# Patient Record
Sex: FEMALE | Race: BLACK OR AFRICAN AMERICAN | NOT HISPANIC OR LATINO | URBAN - METROPOLITAN AREA
[De-identification: names, ages, dates, MRNs, and addresses within clinical notes are randomized per-mention and may not be internally consistent; named-entity substitution may affect disease eponyms.]

---

## 2017-03-30 ENCOUNTER — EMERGENCY (EMERGENCY)
Facility: HOSPITAL | Age: 42
LOS: 1 days | Discharge: HOME | End: 2017-03-30
Attending: EMERGENCY MEDICINE | Admitting: EMERGENCY MEDICINE
Payer: MEDICAID

## 2017-03-30 VITALS
OXYGEN SATURATION: 100 % | HEART RATE: 93 BPM | TEMPERATURE: 98 F | SYSTOLIC BLOOD PRESSURE: 135 MMHG | RESPIRATION RATE: 18 BRPM | DIASTOLIC BLOOD PRESSURE: 89 MMHG

## 2017-03-30 VITALS
DIASTOLIC BLOOD PRESSURE: 90 MMHG | RESPIRATION RATE: 18 BRPM | HEART RATE: 85 BPM | OXYGEN SATURATION: 100 % | SYSTOLIC BLOOD PRESSURE: 146 MMHG

## 2017-03-30 LAB
ALBUMIN SERPL ELPH-MCNC: 4.7 G/DL — SIGNIFICANT CHANGE UP (ref 3.3–5)
ALP SERPL-CCNC: 49 U/L — SIGNIFICANT CHANGE UP (ref 40–120)
ALT FLD-CCNC: 12 U/L — SIGNIFICANT CHANGE UP (ref 4–33)
APPEARANCE UR: CLEAR — SIGNIFICANT CHANGE UP
AST SERPL-CCNC: 18 U/L — SIGNIFICANT CHANGE UP (ref 4–32)
BACTERIA # UR AUTO: SIGNIFICANT CHANGE UP
BASE EXCESS BLDV CALC-SCNC: 3.9 MMOL/L — SIGNIFICANT CHANGE UP
BASOPHILS # BLD AUTO: 0.01 K/UL — SIGNIFICANT CHANGE UP (ref 0–0.2)
BASOPHILS NFR BLD AUTO: 0.1 % — SIGNIFICANT CHANGE UP (ref 0–2)
BILIRUB SERPL-MCNC: 0.3 MG/DL — SIGNIFICANT CHANGE UP (ref 0.2–1.2)
BILIRUB UR-MCNC: NEGATIVE — SIGNIFICANT CHANGE UP
BLOOD GAS VENOUS - CREATININE: 0.63 MG/DL — SIGNIFICANT CHANGE UP (ref 0.5–1.3)
BLOOD UR QL VISUAL: HIGH
BUN SERPL-MCNC: 8 MG/DL — SIGNIFICANT CHANGE UP (ref 7–23)
CALCIUM SERPL-MCNC: 9.5 MG/DL — SIGNIFICANT CHANGE UP (ref 8.4–10.5)
CHLORIDE BLDV-SCNC: 99 MMOL/L — SIGNIFICANT CHANGE UP (ref 96–108)
CHLORIDE SERPL-SCNC: 95 MMOL/L — LOW (ref 98–107)
CO2 SERPL-SCNC: 25 MMOL/L — SIGNIFICANT CHANGE UP (ref 22–31)
COLOR SPEC: SIGNIFICANT CHANGE UP
CREAT SERPL-MCNC: 0.68 MG/DL — SIGNIFICANT CHANGE UP (ref 0.5–1.3)
EOSINOPHIL # BLD AUTO: 0 K/UL — SIGNIFICANT CHANGE UP (ref 0–0.5)
EOSINOPHIL NFR BLD AUTO: 0 % — SIGNIFICANT CHANGE UP (ref 0–6)
GAS PNL BLDV: 136 MMOL/L — SIGNIFICANT CHANGE UP (ref 136–146)
GLUCOSE BLDV-MCNC: 113 — HIGH (ref 70–99)
GLUCOSE SERPL-MCNC: 112 MG/DL — HIGH (ref 70–99)
GLUCOSE UR-MCNC: NEGATIVE — SIGNIFICANT CHANGE UP
HCO3 BLDV-SCNC: 26 MMOL/L — SIGNIFICANT CHANGE UP (ref 20–27)
HCT VFR BLD CALC: 41.6 % — SIGNIFICANT CHANGE UP (ref 34.5–45)
HCT VFR BLDV CALC: 43.5 % — SIGNIFICANT CHANGE UP (ref 34.5–45)
HGB BLD-MCNC: 13.8 G/DL — SIGNIFICANT CHANGE UP (ref 11.5–15.5)
HGB BLDV-MCNC: 14.2 G/DL — SIGNIFICANT CHANGE UP (ref 11.5–15.5)
IMM GRANULOCYTES NFR BLD AUTO: 0.4 % — SIGNIFICANT CHANGE UP (ref 0–1.5)
KETONES UR-MCNC: SIGNIFICANT CHANGE UP
LACTATE BLDV-MCNC: 1.6 MMOL/L — SIGNIFICANT CHANGE UP (ref 0.5–2)
LEUKOCYTE ESTERASE UR-ACNC: NEGATIVE — SIGNIFICANT CHANGE UP
LYMPHOCYTES # BLD AUTO: 0.99 K/UL — LOW (ref 1–3.3)
LYMPHOCYTES # BLD AUTO: 6.6 % — LOW (ref 13–44)
MCHC RBC-ENTMCNC: 31 PG — SIGNIFICANT CHANGE UP (ref 27–34)
MCHC RBC-ENTMCNC: 33.2 % — SIGNIFICANT CHANGE UP (ref 32–36)
MCV RBC AUTO: 93.5 FL — SIGNIFICANT CHANGE UP (ref 80–100)
MONOCYTES # BLD AUTO: 0.83 K/UL — SIGNIFICANT CHANGE UP (ref 0–0.9)
MONOCYTES NFR BLD AUTO: 5.5 % — SIGNIFICANT CHANGE UP (ref 2–14)
MUCOUS THREADS # UR AUTO: SIGNIFICANT CHANGE UP
NEUTROPHILS # BLD AUTO: 13.16 K/UL — HIGH (ref 1.8–7.4)
NEUTROPHILS NFR BLD AUTO: 87.4 % — HIGH (ref 43–77)
NITRITE UR-MCNC: NEGATIVE — SIGNIFICANT CHANGE UP
PCO2 BLDV: 47 MMHG — SIGNIFICANT CHANGE UP (ref 41–51)
PH BLDV: 7.4 PH — SIGNIFICANT CHANGE UP (ref 7.32–7.43)
PH UR: 6.5 — SIGNIFICANT CHANGE UP (ref 4.6–8)
PLATELET # BLD AUTO: 388 K/UL — SIGNIFICANT CHANGE UP (ref 150–400)
PMV BLD: 10.6 FL — SIGNIFICANT CHANGE UP (ref 7–13)
PO2 BLDV: < 24 MMHG — LOW (ref 35–40)
POTASSIUM BLDV-SCNC: 3.1 MMOL/L — LOW (ref 3.4–4.5)
POTASSIUM SERPL-MCNC: 3.2 MMOL/L — LOW (ref 3.5–5.3)
POTASSIUM SERPL-SCNC: 3.2 MMOL/L — LOW (ref 3.5–5.3)
PROT SERPL-MCNC: 8.5 G/DL — HIGH (ref 6–8.3)
PROT UR-MCNC: 10 — SIGNIFICANT CHANGE UP
RBC # BLD: 4.45 M/UL — SIGNIFICANT CHANGE UP (ref 3.8–5.2)
RBC # FLD: 14.7 % — HIGH (ref 10.3–14.5)
RBC CASTS # UR COMP ASSIST: HIGH (ref 0–?)
SAO2 % BLDV: 36.5 % — LOW (ref 60–85)
SODIUM SERPL-SCNC: 138 MMOL/L — SIGNIFICANT CHANGE UP (ref 135–145)
SP GR SPEC: 1.01 — SIGNIFICANT CHANGE UP (ref 1–1.03)
SQUAMOUS # UR AUTO: SIGNIFICANT CHANGE UP
UROBILINOGEN FLD QL: NORMAL E.U. — SIGNIFICANT CHANGE UP (ref 0.1–0.2)
WBC # BLD: 15.05 K/UL — HIGH (ref 3.8–10.5)
WBC # FLD AUTO: 15.05 K/UL — HIGH (ref 3.8–10.5)
WBC UR QL: SIGNIFICANT CHANGE UP (ref 0–?)

## 2017-03-30 PROCEDURE — 74177 CT ABD & PELVIS W/CONTRAST: CPT | Mod: 26

## 2017-03-30 PROCEDURE — 99285 EMERGENCY DEPT VISIT HI MDM: CPT

## 2017-03-30 RX ORDER — SODIUM CHLORIDE 9 MG/ML
1000 INJECTION INTRAMUSCULAR; INTRAVENOUS; SUBCUTANEOUS ONCE
Refills: 0 | Status: COMPLETED | OUTPATIENT
Start: 2017-03-30 | End: 2017-03-30

## 2017-03-30 RX ORDER — MORPHINE SULFATE 50 MG/1
4 CAPSULE, EXTENDED RELEASE ORAL ONCE
Refills: 0 | Status: DISCONTINUED | OUTPATIENT
Start: 2017-03-30 | End: 2017-03-30

## 2017-03-30 RX ORDER — ONDANSETRON 8 MG/1
4 TABLET, FILM COATED ORAL ONCE
Refills: 0 | Status: COMPLETED | OUTPATIENT
Start: 2017-03-30 | End: 2017-03-30

## 2017-03-30 RX ORDER — POLYETHYLENE GLYCOL 3350 17 G/17G
17 POWDER, FOR SOLUTION ORAL
Qty: 85 | Refills: 0
Start: 2017-03-30 | End: 2017-04-04

## 2017-03-30 RX ADMIN — MORPHINE SULFATE 4 MILLIGRAM(S): 50 CAPSULE, EXTENDED RELEASE ORAL at 16:00

## 2017-03-30 RX ADMIN — ONDANSETRON 4 MILLIGRAM(S): 8 TABLET, FILM COATED ORAL at 16:00

## 2017-03-30 RX ADMIN — MORPHINE SULFATE 4 MILLIGRAM(S): 50 CAPSULE, EXTENDED RELEASE ORAL at 17:29

## 2017-03-30 RX ADMIN — MORPHINE SULFATE 4 MILLIGRAM(S): 50 CAPSULE, EXTENDED RELEASE ORAL at 16:18

## 2017-03-30 RX ADMIN — SODIUM CHLORIDE 1000 MILLILITER(S): 9 INJECTION INTRAMUSCULAR; INTRAVENOUS; SUBCUTANEOUS at 16:00

## 2017-03-30 NOTE — ED PROVIDER NOTE - ATTENDING CONTRIBUTION TO CARE
I performed a history and physical exam of the patient and discussed their management with the resident. I reviewed the resident's note and agree with the documented findings and plan of care. My medical decision making and observations are found above.  Abd soft rt sided pain on palpation.

## 2017-03-30 NOTE — ED PROVIDER NOTE - MEDICAL DECISION MAKING DETAILS
41 yo F with abdominal pain in RLQ - likely renal colic - but given age and localization to RLQ will get CT to rule out APPY - labs, urine, fluids, pain control 43 yo F with abdominal pain in RLQ - likely renal colic - but given age and localization to RLQ will get CT to rule out APPY - labs, urine, fluids, pain control  Miguel: Rt sided abdominal pain by exam.  Taking po. Stone possible or appy.

## 2017-03-30 NOTE — ED PROVIDER NOTE - OBJECTIVE STATEMENT
41 yo F with abdominal pain since 2AM last night. Pt states she had pizza and alcohol last night at 8 PM. Pt states she is a social drinker.  had same food with no problems. Pain was sharp and cramping starting in right upper/flank range radiating from the left to right and now localized to the right flank and right lower quadrant. Pt states it is constant. Complains of 2 episodes of vomiting. Pt states she also tried an enema but did not help. Denies fevers, chills, cough, rhinorrhea, otorrhea, otalgia, constipation, diarrhea, chest pain, shortness of breath or changes in urinary habits.

## 2017-03-30 NOTE — ED ADULT TRIAGE NOTE - CHIEF COMPLAINT QUOTE
C/o epigastric pain radiating to flank pain and unable to urinate since this morning and constipation. Appears very uncomfortable in triage.

## 2017-04-01 LAB
BACTERIA UR CULT: SIGNIFICANT CHANGE UP
SPECIMEN SOURCE: SIGNIFICANT CHANGE UP

## 2019-08-08 PROBLEM — Z00.00 ENCOUNTER FOR PREVENTIVE HEALTH EXAMINATION: Status: ACTIVE | Noted: 2019-08-08

## 2019-08-14 ENCOUNTER — APPOINTMENT (OUTPATIENT)
Dept: NEUROLOGY | Facility: CLINIC | Age: 44
End: 2019-08-14

## 2019-08-29 ENCOUNTER — OUTPATIENT (OUTPATIENT)
Dept: OUTPATIENT SERVICES | Facility: HOSPITAL | Age: 44
LOS: 1 days | End: 2019-08-29

## 2019-08-29 DIAGNOSIS — Z00.8 ENCOUNTER FOR OTHER GENERAL EXAMINATION: ICD-10-CM

## 2019-09-05 ENCOUNTER — RESULT REVIEW (OUTPATIENT)
Age: 44
End: 2019-09-05

## 2019-09-05 ENCOUNTER — OUTPATIENT (OUTPATIENT)
Dept: OUTPATIENT SERVICES | Facility: HOSPITAL | Age: 44
LOS: 1 days | End: 2019-09-05
Payer: MEDICAID

## 2019-09-05 ENCOUNTER — APPOINTMENT (OUTPATIENT)
Dept: CT IMAGING | Facility: HOSPITAL | Age: 44
End: 2019-09-05

## 2019-09-05 ENCOUNTER — APPOINTMENT (OUTPATIENT)
Dept: NEUROLOGY | Facility: CLINIC | Age: 44
End: 2019-09-05
Payer: MEDICAID

## 2019-09-05 DIAGNOSIS — R91.8 OTHER NONSPECIFIC ABNORMAL FINDING OF LUNG FIELD: ICD-10-CM

## 2019-09-05 DIAGNOSIS — C79.49 SECONDARY MALIGNANT NEOPLASM OF OTHER PARTS OF NERVOUS SYSTEM: ICD-10-CM

## 2019-09-05 DIAGNOSIS — C80.1 SECONDARY MALIGNANT NEOPLASM OF OTHER PARTS OF NERVOUS SYSTEM: ICD-10-CM

## 2019-09-05 LAB
APTT BLD: 25.3 SEC — LOW (ref 27.5–36.3)
INR BLD: 1.03 RATIO — SIGNIFICANT CHANGE UP (ref 0.88–1.16)
PROTHROM AB SERPL-ACNC: 11.9 SEC — SIGNIFICANT CHANGE UP (ref 10–12.9)

## 2019-09-05 PROCEDURE — 99205 OFFICE O/P NEW HI 60 MIN: CPT

## 2019-09-05 PROCEDURE — 88173 CYTOPATH EVAL FNA REPORT: CPT | Mod: 26

## 2019-09-05 PROCEDURE — 88305 TISSUE EXAM BY PATHOLOGIST: CPT

## 2019-09-05 PROCEDURE — 88342 IMHCHEM/IMCYTCHM 1ST ANTB: CPT

## 2019-09-05 PROCEDURE — 88341 IMHCHEM/IMCYTCHM EA ADD ANTB: CPT | Mod: 26

## 2019-09-05 PROCEDURE — 77012 CT SCAN FOR NEEDLE BIOPSY: CPT | Mod: 26

## 2019-09-05 PROCEDURE — 85730 THROMBOPLASTIN TIME PARTIAL: CPT

## 2019-09-05 PROCEDURE — 32405: CPT

## 2019-09-05 PROCEDURE — 85610 PROTHROMBIN TIME: CPT

## 2019-09-05 PROCEDURE — 88341 IMHCHEM/IMCYTCHM EA ADD ANTB: CPT

## 2019-09-05 PROCEDURE — 88342 IMHCHEM/IMCYTCHM 1ST ANTB: CPT | Mod: 26

## 2019-09-05 PROCEDURE — 88172 CYTP DX EVAL FNA 1ST EA SITE: CPT

## 2019-09-05 PROCEDURE — 88305 TISSUE EXAM BY PATHOLOGIST: CPT | Mod: 26

## 2019-09-05 PROCEDURE — 77012 CT SCAN FOR NEEDLE BIOPSY: CPT

## 2019-09-05 PROCEDURE — 88173 CYTOPATH EVAL FNA REPORT: CPT

## 2019-09-06 PROBLEM — C79.49: Status: ACTIVE | Noted: 2019-09-06

## 2019-09-10 LAB — NON-GYNECOLOGICAL CYTOLOGY STUDY: SIGNIFICANT CHANGE UP

## 2019-09-16 ENCOUNTER — RESULT REVIEW (OUTPATIENT)
Age: 44
End: 2019-09-16

## 2019-09-16 ENCOUNTER — OUTPATIENT (OUTPATIENT)
Dept: OUTPATIENT SERVICES | Facility: HOSPITAL | Age: 44
LOS: 1 days | End: 2019-09-16
Payer: MEDICAID

## 2019-09-16 ENCOUNTER — APPOINTMENT (OUTPATIENT)
Dept: RADIOLOGY | Facility: HOSPITAL | Age: 44
End: 2019-09-16

## 2019-09-16 DIAGNOSIS — C79.49 SECONDARY MALIGNANT NEOPLASM OF OTHER PARTS OF NERVOUS SYSTEM: ICD-10-CM

## 2019-09-16 PROCEDURE — 77003 FLUOROGUIDE FOR SPINE INJECT: CPT | Mod: 26

## 2019-09-16 PROCEDURE — 77003 FLUOROGUIDE FOR SPINE INJECT: CPT

## 2019-09-16 PROCEDURE — 88108 CYTOPATH CONCENTRATE TECH: CPT | Mod: 26,59

## 2019-09-16 PROCEDURE — 88108 CYTOPATH CONCENTRATE TECH: CPT

## 2019-09-16 PROCEDURE — 88108 CYTOPATH CONCENTRATE TECH: CPT | Mod: 26

## 2019-09-16 PROCEDURE — 62270 DX LMBR SPI PNXR: CPT

## 2019-09-16 PROCEDURE — 82945 GLUCOSE OTHER FLUID: CPT

## 2019-09-16 PROCEDURE — 84157 ASSAY OF PROTEIN OTHER: CPT

## 2019-09-16 PROCEDURE — 89051 BODY FLUID CELL COUNT: CPT

## 2019-09-18 LAB — NON-GYNECOLOGICAL CYTOLOGY STUDY: SIGNIFICANT CHANGE UP

## 2019-10-09 ENCOUNTER — APPOINTMENT (OUTPATIENT)
Dept: MRI IMAGING | Facility: CLINIC | Age: 44
End: 2019-10-09
Payer: MEDICAID

## 2019-10-09 ENCOUNTER — OUTPATIENT (OUTPATIENT)
Dept: OUTPATIENT SERVICES | Facility: HOSPITAL | Age: 44
LOS: 1 days | End: 2019-10-09
Payer: MEDICAID

## 2019-10-09 DIAGNOSIS — Z00.8 ENCOUNTER FOR OTHER GENERAL EXAMINATION: ICD-10-CM

## 2019-10-09 PROCEDURE — 72156 MRI NECK SPINE W/O & W/DYE: CPT | Mod: 26

## 2019-10-09 PROCEDURE — 72156 MRI NECK SPINE W/O & W/DYE: CPT

## 2019-10-09 PROCEDURE — 72158 MRI LUMBAR SPINE W/O & W/DYE: CPT | Mod: 26

## 2019-10-09 PROCEDURE — 72157 MRI CHEST SPINE W/O & W/DYE: CPT | Mod: 26

## 2019-10-09 PROCEDURE — A9585: CPT

## 2019-10-09 PROCEDURE — 72157 MRI CHEST SPINE W/O & W/DYE: CPT

## 2019-10-09 PROCEDURE — 72158 MRI LUMBAR SPINE W/O & W/DYE: CPT

## 2019-12-01 ENCOUNTER — INPATIENT (INPATIENT)
Facility: HOSPITAL | Age: 44
LOS: 2 days | Discharge: HOME HEALTH | End: 2019-12-04
Attending: HOSPITALIST | Admitting: HOSPITALIST
Payer: MEDICAID

## 2019-12-01 ENCOUNTER — OUTPATIENT (OUTPATIENT)
Dept: OUTPATIENT SERVICES | Facility: HOSPITAL | Age: 44
LOS: 1 days | End: 2019-12-01
Payer: MEDICAID

## 2019-12-01 VITALS
RESPIRATION RATE: 18 BRPM | HEART RATE: 88 BPM | DIASTOLIC BLOOD PRESSURE: 63 MMHG | OXYGEN SATURATION: 100 % | SYSTOLIC BLOOD PRESSURE: 120 MMHG | TEMPERATURE: 98 F

## 2019-12-01 DIAGNOSIS — Z02.9 ENCOUNTER FOR ADMINISTRATIVE EXAMINATIONS, UNSPECIFIED: ICD-10-CM

## 2019-12-01 DIAGNOSIS — C79.31 SECONDARY MALIGNANT NEOPLASM OF BRAIN: ICD-10-CM

## 2019-12-01 DIAGNOSIS — Z29.9 ENCOUNTER FOR PROPHYLACTIC MEASURES, UNSPECIFIED: ICD-10-CM

## 2019-12-01 DIAGNOSIS — C34.90 MALIGNANT NEOPLASM OF UNSPECIFIED PART OF UNSPECIFIED BRONCHUS OR LUNG: ICD-10-CM

## 2019-12-01 DIAGNOSIS — R51 HEADACHE: ICD-10-CM

## 2019-12-01 DIAGNOSIS — K21.9 GASTRO-ESOPHAGEAL REFLUX DISEASE WITHOUT ESOPHAGITIS: ICD-10-CM

## 2019-12-01 DIAGNOSIS — J45.909 UNSPECIFIED ASTHMA, UNCOMPLICATED: ICD-10-CM

## 2019-12-01 LAB
ALBUMIN SERPL ELPH-MCNC: 4.6 G/DL — SIGNIFICANT CHANGE UP (ref 3.3–5)
ALP SERPL-CCNC: 33 U/L — LOW (ref 40–120)
ALT FLD-CCNC: 4 U/L — SIGNIFICANT CHANGE UP (ref 4–33)
ANION GAP SERPL CALC-SCNC: 17 MMO/L — HIGH (ref 7–14)
APTT BLD: 30.9 SEC — SIGNIFICANT CHANGE UP (ref 27.5–36.3)
AST SERPL-CCNC: 12 U/L — SIGNIFICANT CHANGE UP (ref 4–32)
BASE EXCESS BLDV CALC-SCNC: 1.6 MMOL/L — SIGNIFICANT CHANGE UP
BASOPHILS # BLD AUTO: 0.01 K/UL — SIGNIFICANT CHANGE UP (ref 0–0.2)
BASOPHILS NFR BLD AUTO: 0.2 % — SIGNIFICANT CHANGE UP (ref 0–2)
BILIRUB SERPL-MCNC: 0.2 MG/DL — SIGNIFICANT CHANGE UP (ref 0.2–1.2)
BLOOD GAS VENOUS - CREATININE: 0.86 MG/DL — SIGNIFICANT CHANGE UP (ref 0.5–1.3)
BLOOD GAS VENOUS - FIO2: 21 — SIGNIFICANT CHANGE UP
BUN SERPL-MCNC: 4 MG/DL — LOW (ref 7–23)
CALCIUM SERPL-MCNC: 9.7 MG/DL — SIGNIFICANT CHANGE UP (ref 8.4–10.5)
CHLORIDE BLDV-SCNC: 107 MMOL/L — SIGNIFICANT CHANGE UP (ref 96–108)
CHLORIDE SERPL-SCNC: 103 MMOL/L — SIGNIFICANT CHANGE UP (ref 98–107)
CO2 SERPL-SCNC: 21 MMOL/L — LOW (ref 22–31)
CREAT SERPL-MCNC: 0.85 MG/DL — SIGNIFICANT CHANGE UP (ref 0.5–1.3)
EOSINOPHIL # BLD AUTO: 0.01 K/UL — SIGNIFICANT CHANGE UP (ref 0–0.5)
EOSINOPHIL NFR BLD AUTO: 0.2 % — SIGNIFICANT CHANGE UP (ref 0–6)
GAS PNL BLDV: 140 MMOL/L — SIGNIFICANT CHANGE UP (ref 136–146)
GLUCOSE BLDV-MCNC: 97 MG/DL — SIGNIFICANT CHANGE UP (ref 70–99)
GLUCOSE SERPL-MCNC: 100 MG/DL — HIGH (ref 70–99)
HCG SERPL-ACNC: < 5 MIU/ML — SIGNIFICANT CHANGE UP
HCO3 BLDV-SCNC: 25 MMOL/L — SIGNIFICANT CHANGE UP (ref 20–27)
HCT VFR BLD CALC: 38.5 % — SIGNIFICANT CHANGE UP (ref 34.5–45)
HCT VFR BLDV CALC: 39.6 % — SIGNIFICANT CHANGE UP (ref 34.5–45)
HGB BLD-MCNC: 12.5 G/DL — SIGNIFICANT CHANGE UP (ref 11.5–15.5)
HGB BLDV-MCNC: 12.9 G/DL — SIGNIFICANT CHANGE UP (ref 11.5–15.5)
IMM GRANULOCYTES NFR BLD AUTO: 0.2 % — SIGNIFICANT CHANGE UP (ref 0–1.5)
INR BLD: 1.2 — HIGH (ref 0.88–1.17)
LACTATE BLDV-MCNC: 1.6 MMOL/L — SIGNIFICANT CHANGE UP (ref 0.5–2)
LYMPHOCYTES # BLD AUTO: 0.71 K/UL — LOW (ref 1–3.3)
LYMPHOCYTES # BLD AUTO: 16 % — SIGNIFICANT CHANGE UP (ref 13–44)
MCHC RBC-ENTMCNC: 30.6 PG — SIGNIFICANT CHANGE UP (ref 27–34)
MCHC RBC-ENTMCNC: 32.5 % — SIGNIFICANT CHANGE UP (ref 32–36)
MCV RBC AUTO: 94.4 FL — SIGNIFICANT CHANGE UP (ref 80–100)
MONOCYTES # BLD AUTO: 0.5 K/UL — SIGNIFICANT CHANGE UP (ref 0–0.9)
MONOCYTES NFR BLD AUTO: 11.3 % — SIGNIFICANT CHANGE UP (ref 2–14)
NEUTROPHILS # BLD AUTO: 3.19 K/UL — SIGNIFICANT CHANGE UP (ref 1.8–7.4)
NEUTROPHILS NFR BLD AUTO: 72.1 % — SIGNIFICANT CHANGE UP (ref 43–77)
NRBC # FLD: 0 K/UL — SIGNIFICANT CHANGE UP (ref 0–0)
PCO2 BLDV: 41 MMHG — SIGNIFICANT CHANGE UP (ref 41–51)
PH BLDV: 7.42 PH — SIGNIFICANT CHANGE UP (ref 7.32–7.43)
PLATELET # BLD AUTO: 278 K/UL — SIGNIFICANT CHANGE UP (ref 150–400)
PMV BLD: 11.1 FL — SIGNIFICANT CHANGE UP (ref 7–13)
PO2 BLDV: 28 MMHG — LOW (ref 35–40)
POTASSIUM BLDV-SCNC: 3 MMOL/L — LOW (ref 3.4–4.5)
POTASSIUM SERPL-MCNC: 3.1 MMOL/L — LOW (ref 3.5–5.3)
POTASSIUM SERPL-SCNC: 3.1 MMOL/L — LOW (ref 3.5–5.3)
PROT SERPL-MCNC: 7.7 G/DL — SIGNIFICANT CHANGE UP (ref 6–8.3)
PROTHROM AB SERPL-ACNC: 13.7 SEC — HIGH (ref 9.8–13.1)
RBC # BLD: 4.08 M/UL — SIGNIFICANT CHANGE UP (ref 3.8–5.2)
RBC # FLD: 13.6 % — SIGNIFICANT CHANGE UP (ref 10.3–14.5)
SAO2 % BLDV: 48.8 % — LOW (ref 60–85)
SODIUM SERPL-SCNC: 141 MMOL/L — SIGNIFICANT CHANGE UP (ref 135–145)
WBC # BLD: 4.43 K/UL — SIGNIFICANT CHANGE UP (ref 3.8–10.5)
WBC # FLD AUTO: 4.43 K/UL — SIGNIFICANT CHANGE UP (ref 3.8–10.5)

## 2019-12-01 PROCEDURE — 70450 CT HEAD/BRAIN W/O DYE: CPT | Mod: 26

## 2019-12-01 PROCEDURE — 99223 1ST HOSP IP/OBS HIGH 75: CPT | Mod: GC

## 2019-12-01 PROCEDURE — 99222 1ST HOSP IP/OBS MODERATE 55: CPT

## 2019-12-01 PROCEDURE — G9001: CPT

## 2019-12-01 RX ORDER — ACETAMINOPHEN 500 MG
650 TABLET ORAL ONCE
Refills: 0 | Status: COMPLETED | OUTPATIENT
Start: 2019-12-01 | End: 2019-12-01

## 2019-12-01 RX ORDER — SODIUM CHLORIDE 9 MG/ML
1000 INJECTION INTRAMUSCULAR; INTRAVENOUS; SUBCUTANEOUS ONCE
Refills: 0 | Status: COMPLETED | OUTPATIENT
Start: 2019-12-01 | End: 2019-12-01

## 2019-12-01 RX ORDER — MONTELUKAST 4 MG/1
4 TABLET, CHEWABLE ORAL DAILY
Refills: 0 | Status: DISCONTINUED | OUTPATIENT
Start: 2019-12-01 | End: 2019-12-01

## 2019-12-01 RX ORDER — LEVETIRACETAM 250 MG/1
500 TABLET, FILM COATED ORAL
Refills: 0 | Status: DISCONTINUED | OUTPATIENT
Start: 2019-12-01 | End: 2019-12-04

## 2019-12-01 RX ORDER — DEXAMETHASONE 0.5 MG/5ML
4 ELIXIR ORAL EVERY 6 HOURS
Refills: 0 | Status: DISCONTINUED | OUTPATIENT
Start: 2019-12-01 | End: 2019-12-04

## 2019-12-01 RX ORDER — POTASSIUM CHLORIDE 20 MEQ
40 PACKET (EA) ORAL ONCE
Refills: 0 | Status: COMPLETED | OUTPATIENT
Start: 2019-12-01 | End: 2019-12-01

## 2019-12-01 RX ORDER — ALBUTEROL 90 UG/1
1 AEROSOL, METERED ORAL EVERY 4 HOURS
Refills: 0 | Status: DISCONTINUED | OUTPATIENT
Start: 2019-12-01 | End: 2019-12-04

## 2019-12-01 RX ORDER — DEXAMETHASONE 0.5 MG/5ML
10 ELIXIR ORAL ONCE
Refills: 0 | Status: COMPLETED | OUTPATIENT
Start: 2019-12-01 | End: 2019-12-01

## 2019-12-01 RX ORDER — ACETAMINOPHEN 500 MG
650 TABLET ORAL EVERY 6 HOURS
Refills: 0 | Status: DISCONTINUED | OUTPATIENT
Start: 2019-12-01 | End: 2019-12-04

## 2019-12-01 RX ORDER — LEVETIRACETAM 250 MG/1
500 TABLET, FILM COATED ORAL ONCE
Refills: 0 | Status: COMPLETED | OUTPATIENT
Start: 2019-12-01 | End: 2019-12-01

## 2019-12-01 RX ORDER — ALBUTEROL 90 UG/1
2.5 AEROSOL, METERED ORAL EVERY 6 HOURS
Refills: 0 | Status: DISCONTINUED | OUTPATIENT
Start: 2019-12-01 | End: 2019-12-04

## 2019-12-01 RX ORDER — METOCLOPRAMIDE HCL 10 MG
10 TABLET ORAL ONCE
Refills: 0 | Status: COMPLETED | OUTPATIENT
Start: 2019-12-01 | End: 2019-12-01

## 2019-12-01 RX ORDER — MONTELUKAST 4 MG/1
10 TABLET, CHEWABLE ORAL DAILY
Refills: 0 | Status: DISCONTINUED | OUTPATIENT
Start: 2019-12-01 | End: 2019-12-04

## 2019-12-01 RX ORDER — INFLUENZA VIRUS VACCINE 15; 15; 15; 15 UG/.5ML; UG/.5ML; UG/.5ML; UG/.5ML
0.5 SUSPENSION INTRAMUSCULAR ONCE
Refills: 0 | Status: DISCONTINUED | OUTPATIENT
Start: 2019-12-01 | End: 2019-12-04

## 2019-12-01 RX ADMIN — SODIUM CHLORIDE 1000 MILLILITER(S): 9 INJECTION INTRAMUSCULAR; INTRAVENOUS; SUBCUTANEOUS at 13:53

## 2019-12-01 RX ADMIN — LEVETIRACETAM 500 MILLIGRAM(S): 250 TABLET, FILM COATED ORAL at 17:24

## 2019-12-01 RX ADMIN — Medication 10 MILLIGRAM(S): at 13:53

## 2019-12-01 RX ADMIN — Medication 30 MILLILITER(S): at 13:53

## 2019-12-01 RX ADMIN — Medication 650 MILLIGRAM(S): at 17:05

## 2019-12-01 RX ADMIN — Medication 40 MILLIEQUIVALENT(S): at 15:27

## 2019-12-01 RX ADMIN — LEVETIRACETAM 500 MILLIGRAM(S): 250 TABLET, FILM COATED ORAL at 22:00

## 2019-12-01 RX ADMIN — Medication 4 MILLIGRAM(S): at 23:36

## 2019-12-01 RX ADMIN — Medication 102 MILLIGRAM(S): at 17:37

## 2019-12-01 RX ADMIN — Medication 40 MILLIEQUIVALENT(S): at 20:09

## 2019-12-01 RX ADMIN — LEVETIRACETAM 400 MILLIGRAM(S): 250 TABLET, FILM COATED ORAL at 17:09

## 2019-12-01 RX ADMIN — Medication 650 MILLIGRAM(S): at 13:52

## 2019-12-01 NOTE — H&P ADULT - ATTENDING COMMENTS
Patient seen and examined. Case discussed with Dr. Neumann. I agree with the findings and the plan above.    Briefly, patient a 44 year old lady with lung cancer that is metastatic to her brain s/p whole brain radiation currently on Tagrisso p/w progressive headaches. She also c/o nausea, decreased appetite and weight loss with decreased performance status. On physical exam, no acute neurological deficits. CT brain shows areas with edema. Patient's  reports recent steroid taper.     Patient with #intractable headache in the setting of #lung cancer metastatic to the brain  Follow up MRI of the head, Neurosurgery, RT and Onc  Continue steroids

## 2019-12-01 NOTE — H&P ADULT - NSHPREVIEWOFSYSTEMS_GEN_ALL_CORE
REVIEW OF SYSTEMS:    General:	  Skin/Breast:  Ophthalmologic:  ENMT:	  Respiratory and Thorax:  Cardiovascular:	  Gastrointestinal:	  Genitourinary:	  Musculoskeletal:	  Neurological:	  Psychiatric: REVIEW OF SYSTEMS:    General: fatigue, decreased appetite, weight loss  Skin/Breast: no rashses  Ophthalmologic: no vision changes, diplopia, blurry vision  ENMT: no neck pain, no hearing loss, no rhinorrhea, no sore throat  Respiratory and Thorax: no SOB. dyspnea, cough  Cardiovascular: no palpitations, lightheadedness, CP  Gastrointestinal: no abd pain, diarrhea, constipation, endorses nausea, decreased appetite  Genitourinary: no dysuria, hematuria  Musculoskeletal: no back pain  Neurological: headaches frontal  Psychiatric: normal mood/affect

## 2019-12-01 NOTE — ED PROVIDER NOTE - CLINICAL SUMMARY MEDICAL DECISION MAKING FREE TEXT BOX
A/P 45 yo F with metastatic lung ca p/w unextractable HA, afebrile, nonfocal  -CT, labs, antiemetic, analgesia, reassess

## 2019-12-01 NOTE — H&P ADULT - PROBLEM SELECTOR PLAN 2
Patient on Tagrisso 80 mg qd as per oncologist, has been doing well  -Onc has been consulted, f/u recs  -C/w albuterol, montelukast

## 2019-12-01 NOTE — ED PROVIDER NOTE - CARE PLAN
Principal Discharge DX:	Metastatic lung cancer (metastasis from lung to other site)  Secondary Diagnosis:	Brain metastasis  Secondary Diagnosis:	Headache

## 2019-12-01 NOTE — H&P ADULT - ASSESSMENT
43 yo F w/ h/o asthma, GERD, metastatic lung ca with mets to brain on oral chemo p/w 2 days of HA unresponsive to excedrin with CT head showing areas of edema.

## 2019-12-01 NOTE — ED PROVIDER NOTE - PMH
Asthma, unspecified asthma severity, unspecified whether complicated, unspecified whether persistent    Gastroesophageal reflux disease, esophagitis presence not specified    Malignant neoplasm of lung, unspecified laterality, unspecified part of lung

## 2019-12-01 NOTE — H&P ADULT - NSHPSOCIALHISTORY_GEN_ALL_CORE
Lives at home with . Used to work but since seizure in July has not due to extreme fatigue. Used to smoke socially, quit over 20 years ago. no drugs/alcohol. two children, age 26 and 21.

## 2019-12-01 NOTE — ED PROVIDER NOTE - OBJECTIVE STATEMENT
Attending/Carlotta: 43 yo F w/ h/o metastatic lung ca on oral chemo p/w 2 days of HA. The HA started yesterday afternoon, fontal, non-radiating, non-positional not associated with vision changes, neck pain, fever/chills, or weakness. She had taken Excedrin twice with no relief.    Meds: Norethindrone 5mg; Tagrisso 80 mg; Levetiracetam 500 mg BID  Onc: Dr. Sincere Shi [phone]

## 2019-12-01 NOTE — H&P ADULT - NSHPLABSRESULTS_GEN_ALL_CORE
CBC Full  -  ( 01 Dec 2019 13:50 )  WBC Count : 4.43 K/uL  RBC Count : 4.08 M/uL  Hemoglobin : 12.5 g/dL  Hematocrit : 38.5 %  Platelet Count - Automated : 278 K/uL  Mean Cell Volume : 94.4 fL  Mean Cell Hemoglobin : 30.6 pg  Mean Cell Hemoglobin Concentration : 32.5 %  Auto Neutrophil # : 3.19 K/uL  Auto Lymphocyte # : 0.71 K/uL  Auto Monocyte # : 0.50 K/uL  Auto Eosinophil # : 0.01 K/uL  Auto Basophil # : 0.01 K/uL  Auto Neutrophil % : 72.1 %  Auto Lymphocyte % : 16.0 %  Auto Monocyte % : 11.3 %  Auto Eosinophil % : 0.2 %  Auto Basophil % : 0.2 %    12-01    141  |  103  |  4<L>  ----------------------------<  100<H>  3.1<L>   |  21<L>  |  0.85    Ca    9.7      01 Dec 2019 13:50    TPro  7.7  /  Alb  4.6  /  TBili  0.2  /  DBili  x   /  AST  12  /  ALT  4   /  AlkPhos  33<L>  12-01    Ct Head: IMPRESSION:     Several foci of decreased attenuation some of which are cortically based   and one of which is deeper the periventricular region which are   suspicious for metastatic foci with the low attenuation representing   areas of edema. Recommend MR with gadolinium for more complete   evaluation. Less likely but a consideration is an polyp infarcts however   clinical correlation is recommended again along with MR contrast imaging

## 2019-12-01 NOTE — ED ADULT NURSE NOTE - OBJECTIVE STATEMENT
pt with Hx. lung CA coming HA x 3 days, denies dizziness, no fever, no N/V labs sent, IV to left AC 20g angio cath, IV fluids started, pt cooperative, gait state, pending Ct of head.     Yahaira Bello RN

## 2019-12-01 NOTE — CONSULT NOTE ADULT - SUBJECTIVE AND OBJECTIVE BOX
43 yo F w/ h/o asthma, GERD, metastatic lung ca with mets to brain s/p XRT at [facility], on oral chemo p/w 2 days of HA. The HA started yesterday afternoon, fontal, non-radiating, non-positional. She had taken Excedrin twice with no relief. Patient states she has been getting headaches once a week for the past few months but that they have not been too intense. Excedrin usually relieves her HA. Patient states her HA last night was a 10/10 with no relief. In the ED, she stated that her headache was now a 3/10. Patient also states that she has been feeling nauseous for the past few weeks, but has never vomited. She is able to eat, but not much due to the nausea and a bad taste in her mouth. Patient denies any other complaints. Patient denies CP, SOB, cough, hemoptysis, abd pain, vomiting, leg swelling, numbness, weakness, tingling sensation, back pain, urinary/fecal incontinence, vision changes, hearing loss, dysuria, hematuria, diarrhea, constipation. Patient endorses fatigue, sleeping most of the day, not being active. Has had 5lb weight loss and decreased appetite.     WDWN female in NAD  Vital Signs Last 24 Hrs  T(C): 37 (01 Dec 2019 17:05), Max: 37 (01 Dec 2019 17:05)  T(F): 98.6 (01 Dec 2019 17:05), Max: 98.6 (01 Dec 2019 17:05)  HR: 81 (01 Dec 2019 17:05) (81 - 88)  BP: 112/75 (01 Dec 2019 17:05) (98/57 - 120/63)  BP(mean): --  RR: 17 (01 Dec 2019 17:05) (16 - 18)  SpO2: 100% (01 Dec 2019 17:05) (99% - 100%)    AAO X 3  PERRLA, EOMI  CN 2-12 grossly intact  APPIAH strength 5/5, no drift  SILT    < from: CT Head No Cont (12.01.19 @ 15:36) >    Foci of low attenuation in the left posterior frontal region at the level   of the cranial vertex, in the left parietal territory, in the right   posterior frontal cortical region as well as questionably in the right   cerebellum. Focus of low attenuation also suggested in the region of the   right genuine of the internal capsule region. (2:14) Given the patient's   history, these may represent is small regions of metastatic involvement   with the edema apparent on the current imaging. Alternatively possibility   of small embolic infarcts are not entirely excluded. MR with gadolinium   would be helpful for better definition.  There is no acute intracranial hemorrhage, extra-axial fluid collection,   hydrocephalus or midline shift.    Ventricles and sulci are normal in size for the patient's age. Basal   cisterns are patent.    Partial opacifications of the bilateral mastoid air tip, right greater   than left.Imaged portions of the paranasal sinuses are free of acute   disease. Calvarium is intact.     IMPRESSION:     Several foci of decreased attenuation some of which are cortically based   and one of which is deeper the periventricular region which are   suspicious for metastatic foci with the low attenuation representing   areas of edema. Recommend MR with gadolinium for more complete   evaluation. Less likely but a consideration is an polyp infarcts however   clinical correlation is recommended again along with MR contrast imaging    < end of copied text >    12-01    141  |  103  |  4<L>  ----------------------------<  100<H>  3.1<L>   |  21<L>  |  0.85    Ca    9.7      01 Dec 2019 13:50    TPro  7.7  /  Alb  4.6  /  TBili  0.2  /  DBili  x   /  AST  12  /  ALT  4   /  AlkPhos  33<L>  12-01

## 2019-12-01 NOTE — ED PROVIDER NOTE - PROGRESS NOTE DETAILS
Carmen PGY2- seen with Dr CHYNA Laguerre, recent diagnosis of lung CA with mets to brain this past year, s/p brain radiation, oral chemo this past week, now with HA for last 24 hours, no neuro complaints, no visual changes, no fever, no vomiting, ambulating normally, see Dr. Jernigan for oncology  uncomfortable but well appearing, vitals wnl, normal neuro exam  given known CA and brain mets, will obtain CTH, treat pain, checks serologies,  dispo pending w/u and recs of primary oncology Haverty PGY2- CT with signs of lesions and edema, unclear if better/new/worse because no other imaging, given pt with HA (but no neuro complaints, normal neuro exam) will tx with decadron and admit, Dr. Shi aware will see as in patient, no indication for emergent NSG c/s at this time given pt comfortable and with normal neuro exam, Dr. GREGORY Jo to admit

## 2019-12-01 NOTE — H&P ADULT - NSHPPHYSICALEXAM_GEN_ALL_CORE
PHYSICAL EXAM:      Constitutional:    Eyes:    ENMT:    Neck:    Breasts:    Back:    Respiratory:    Cardiovascular:    Gastrointestinal:    Genitourinary:    Rectal:    Extremities:    Vascular:    Neurological:    Skin:    Lymph Nodes:    Musculoskeletal:    Psychiatric: PHYSICAL EXAM:  Constitutional: NAD, eating food  Eyes: PERRLA, EOMI  ENMT: NCAT  Back: no ttp  Respiratory: CTA b/l  Cardiovascular: RRR, normal S1/S2, no m/r/g  Gastrointestinal: soft, nt, nd  Extremities: no leg edema  Vascular: 2+ pulses b/l  Neurological: A&Ox3, no weakness/numbness, sensation intact  Skin: no rashes

## 2019-12-01 NOTE — ED ADULT NURSE NOTE - NSIMPLEMENTINTERV_GEN_ALL_ED
Implemented All Universal Safety Interventions:  [place] to call system. Call bell, personal items and telephone within reach. Instruct patient to call for assistance. Room bathroom lighting operational. Non-slip footwear when patient is off stretcher. Physically safe environment: no spills, clutter or unnecessary equipment. Stretcher in lowest position, wheels locked, appropriate side rails in place.

## 2019-12-01 NOTE — H&P ADULT - NSICDXPASTMEDICALHX_GEN_ALL_CORE_FT
PAST MEDICAL HISTORY:  Asthma, unspecified asthma severity, unspecified whether complicated, unspecified whether persistent     Gastroesophageal reflux disease, esophagitis presence not specified     Malignant neoplasm of lung, unspecified laterality, unspecified part of lung

## 2019-12-01 NOTE — H&P ADULT - PROBLEM SELECTOR PLAN 1
Patient has been getting intermittent headaches for a few weeks that usually responds to excedrin. Likely 2/2 to brain mets from primary lung cancer.   -CT head showing areas of edema  -Dr. Shi (Oncologist) has been consulted, will f/u recs  -NSG consulted  -F/U MR head  -Pain control  -will f/u NSG recs and Onc recs for decadron dose, s/p 10 mg in ED

## 2019-12-01 NOTE — H&P ADULT - HISTORY OF PRESENT ILLNESS
43 yo F w/ h/o asthma, GERD, metastatic lung ca with mets to brain on oral chemo p/w 2 days of HA. The HA started yesterday afternoon, fontal, non-radiating, non-positional. She had taken Excedrin twice with no relief.    Meds: Norethindrone 5mg; Tagrisso 80 mg; Levetiracetam 500 mg BID  Onc: Dr. Sincere Shi [phone]    ED Course: Received Decadron 10 mg, Keppra 500 mg, Reglan 10 mg. CT head demonstrating Several foci of decreased attenuation some of which are cortically based and one of which is deeper the periventricular region which are suspicious for metastatic foci with the low attenuation representing areas of edema. Private oncologist consulted. 43 yo F w/ h/o asthma, GERD, metastatic lung ca with mets to brain on oral chemo p/w 2 days of HA. The HA started yesterday afternoon, fontal, non-radiating, non-positional. She had taken Excedrin twice with no relief. Patient states she has been getting headaches once a week for the past few months but that they have not been too intense. Excedrin usually relieves her HA. Patient states her HA last night was a 10/10 with no relief. In the ED, she stated that her headache was now a 3/10. Patient also states that she has been feeling nauseous for the past few weeks, but has never vomited. She is able to eat, but not much due to the nausea and a bad taste in her mouth. Patient denies any other complaints. Patient denies CP, SOB, cough, hemoptysis, abd pain, vomiting, leg swelling, numbness, weakness, tingling sensation, back pain, urinary/fecal incontinence, vision changes, hearing loss, dysuria, hematuria, diarrhea, constipation. Patient endorses fatigue, sleeping most of the day, not being active. Has had 5lb weight loss and decreased appetite.     Meds: Norethindrone 5mg; Tagrisso 80 mg; Levetiracetam 500 mg BID  Onc: Dr. Sincere Shi [phone]    ED Course: Received Decadron 10 mg, Keppra 500 mg, Reglan 10 mg. CT head demonstrating Several foci of decreased attenuation some of which are cortically based and one of which is deeper the periventricular region which are suspicious for metastatic foci with the low attenuation representing areas of edema. Private oncologist consulted.

## 2019-12-02 LAB
ANION GAP SERPL CALC-SCNC: 15 MMO/L — HIGH (ref 7–14)
BUN SERPL-MCNC: 5 MG/DL — LOW (ref 7–23)
CALCIUM SERPL-MCNC: 10.1 MG/DL — SIGNIFICANT CHANGE UP (ref 8.4–10.5)
CHLORIDE SERPL-SCNC: 103 MMOL/L — SIGNIFICANT CHANGE UP (ref 98–107)
CO2 SERPL-SCNC: 19 MMOL/L — LOW (ref 22–31)
CREAT SERPL-MCNC: 0.77 MG/DL — SIGNIFICANT CHANGE UP (ref 0.5–1.3)
GLUCOSE BLDC GLUCOMTR-MCNC: 111 MG/DL — HIGH (ref 70–99)
GLUCOSE BLDC GLUCOMTR-MCNC: 113 MG/DL — HIGH (ref 70–99)
GLUCOSE SERPL-MCNC: 111 MG/DL — HIGH (ref 70–99)
HCT VFR BLD CALC: 41.7 % — SIGNIFICANT CHANGE UP (ref 34.5–45)
HGB BLD-MCNC: 13.1 G/DL — SIGNIFICANT CHANGE UP (ref 11.5–15.5)
INR BLD: 1.14 — SIGNIFICANT CHANGE UP (ref 0.88–1.17)
MAGNESIUM SERPL-MCNC: 2 MG/DL — SIGNIFICANT CHANGE UP (ref 1.6–2.6)
MCHC RBC-ENTMCNC: 29.8 PG — SIGNIFICANT CHANGE UP (ref 27–34)
MCHC RBC-ENTMCNC: 31.4 % — LOW (ref 32–36)
MCV RBC AUTO: 94.8 FL — SIGNIFICANT CHANGE UP (ref 80–100)
NRBC # FLD: 0 K/UL — SIGNIFICANT CHANGE UP (ref 0–0)
PHOSPHATE SERPL-MCNC: 4.1 MG/DL — SIGNIFICANT CHANGE UP (ref 2.5–4.5)
PLATELET # BLD AUTO: 288 K/UL — SIGNIFICANT CHANGE UP (ref 150–400)
PMV BLD: 11.1 FL — SIGNIFICANT CHANGE UP (ref 7–13)
POTASSIUM SERPL-MCNC: 4.2 MMOL/L — SIGNIFICANT CHANGE UP (ref 3.5–5.3)
POTASSIUM SERPL-SCNC: 4.2 MMOL/L — SIGNIFICANT CHANGE UP (ref 3.5–5.3)
PROTHROM AB SERPL-ACNC: 13.1 SEC — SIGNIFICANT CHANGE UP (ref 9.8–13.1)
RBC # BLD: 4.4 M/UL — SIGNIFICANT CHANGE UP (ref 3.8–5.2)
RBC # FLD: 13.7 % — SIGNIFICANT CHANGE UP (ref 10.3–14.5)
SODIUM SERPL-SCNC: 137 MMOL/L — SIGNIFICANT CHANGE UP (ref 135–145)
WBC # BLD: 3.63 K/UL — LOW (ref 3.8–10.5)
WBC # FLD AUTO: 3.63 K/UL — LOW (ref 3.8–10.5)

## 2019-12-02 PROCEDURE — 99233 SBSQ HOSP IP/OBS HIGH 50: CPT | Mod: GC

## 2019-12-02 PROCEDURE — 99223 1ST HOSP IP/OBS HIGH 75: CPT

## 2019-12-02 RX ORDER — OXYCODONE HYDROCHLORIDE 5 MG/1
5 TABLET ORAL ONCE
Refills: 0 | Status: DISCONTINUED | OUTPATIENT
Start: 2019-12-02 | End: 2019-12-02

## 2019-12-02 RX ORDER — OXYCODONE HYDROCHLORIDE 5 MG/1
5 TABLET ORAL EVERY 6 HOURS
Refills: 0 | Status: DISCONTINUED | OUTPATIENT
Start: 2019-12-02 | End: 2019-12-04

## 2019-12-02 RX ORDER — INSULIN LISPRO 100/ML
VIAL (ML) SUBCUTANEOUS AT BEDTIME
Refills: 0 | Status: DISCONTINUED | OUTPATIENT
Start: 2019-12-02 | End: 2019-12-04

## 2019-12-02 RX ORDER — SODIUM CHLORIDE 9 MG/ML
1000 INJECTION, SOLUTION INTRAVENOUS
Refills: 0 | Status: DISCONTINUED | OUTPATIENT
Start: 2019-12-02 | End: 2019-12-04

## 2019-12-02 RX ORDER — DEXTROSE 50 % IN WATER 50 %
12.5 SYRINGE (ML) INTRAVENOUS ONCE
Refills: 0 | Status: DISCONTINUED | OUTPATIENT
Start: 2019-12-02 | End: 2019-12-04

## 2019-12-02 RX ORDER — DEXTROSE 50 % IN WATER 50 %
25 SYRINGE (ML) INTRAVENOUS ONCE
Refills: 0 | Status: DISCONTINUED | OUTPATIENT
Start: 2019-12-02 | End: 2019-12-04

## 2019-12-02 RX ORDER — DEXTROSE 50 % IN WATER 50 %
15 SYRINGE (ML) INTRAVENOUS ONCE
Refills: 0 | Status: DISCONTINUED | OUTPATIENT
Start: 2019-12-02 | End: 2019-12-04

## 2019-12-02 RX ORDER — GLUCAGON INJECTION, SOLUTION 0.5 MG/.1ML
1 INJECTION, SOLUTION SUBCUTANEOUS ONCE
Refills: 0 | Status: DISCONTINUED | OUTPATIENT
Start: 2019-12-02 | End: 2019-12-04

## 2019-12-02 RX ORDER — INSULIN LISPRO 100/ML
VIAL (ML) SUBCUTANEOUS
Refills: 0 | Status: DISCONTINUED | OUTPATIENT
Start: 2019-12-02 | End: 2019-12-04

## 2019-12-02 RX ADMIN — MONTELUKAST 10 MILLIGRAM(S): 4 TABLET, CHEWABLE ORAL at 12:30

## 2019-12-02 RX ADMIN — Medication 4 MILLIGRAM(S): at 12:30

## 2019-12-02 RX ADMIN — Medication 4 MILLIGRAM(S): at 05:04

## 2019-12-02 RX ADMIN — LEVETIRACETAM 500 MILLIGRAM(S): 250 TABLET, FILM COATED ORAL at 05:05

## 2019-12-02 RX ADMIN — Medication 4 MILLIGRAM(S): at 17:16

## 2019-12-02 RX ADMIN — LEVETIRACETAM 500 MILLIGRAM(S): 250 TABLET, FILM COATED ORAL at 17:17

## 2019-12-02 RX ADMIN — Medication 4 MILLIGRAM(S): at 23:21

## 2019-12-02 RX ADMIN — Medication 650 MILLIGRAM(S): at 06:54

## 2019-12-02 RX ADMIN — Medication 650 MILLIGRAM(S): at 06:25

## 2019-12-02 RX ADMIN — OXYCODONE HYDROCHLORIDE 5 MILLIGRAM(S): 5 TABLET ORAL at 08:36

## 2019-12-02 RX ADMIN — Medication 30 MILLILITER(S): at 23:52

## 2019-12-02 RX ADMIN — OXYCODONE HYDROCHLORIDE 5 MILLIGRAM(S): 5 TABLET ORAL at 07:36

## 2019-12-02 NOTE — PROGRESS NOTE ADULT - SUBJECTIVE AND OBJECTIVE BOX
Toni Neumann, PGY-1  [phone]    Patient is a 44y old  Female who presents with a chief complaint of Headache (02 Dec 2019 07:50)      SUBJECTIVE/OVERNIGHT EVENTS" ON had HA, got tylenol. DIdnt help. Patient seen and examined at bedside this AM. Still had FRAIRE prior to going to MRI. Gave oxy 5mg. Patient denies any other complaints. Denies f/c/n/v/d/c.     MEDICATIONS  (STANDING):  dexAMETHasone     Tablet 4 milliGRAM(s) Oral every 6 hours  influenza   Vaccine 0.5 milliLiter(s) IntraMuscular once  levETIRAcetam 500 milliGRAM(s) Oral two times a day  montelukast  Chewable 10 milliGRAM(s) Oral daily    MEDICATIONS  (PRN):  acetaminophen   Tablet .. 650 milliGRAM(s) Oral every 6 hours PRN Moderate Pain (4 - 6)  ALBUTerol    0.083% 2.5 milliGRAM(s) Nebulizer every 6 hours PRN Shortness of Breath and/or Wheezing  ALBUTerol    90 MICROgram(s) HFA Inhaler 1 Puff(s) Inhalation every 4 hours PRN Shortness of Breath and/or Wheezing    CAPILLARY BLOOD GLUCOSE        I&O's Summary        Vital Signs Last 24 Hrs  T(C): 36.6 (02 Dec 2019 04:59), Max: 37 (01 Dec 2019 17:05)  T(F): 97.9 (02 Dec 2019 04:59), Max: 98.6 (01 Dec 2019 17:05)  HR: 71 (02 Dec 2019 04:59) (71 - 88)  BP: 105/67 (02 Dec 2019 04:59) (98/57 - 120/63)  BP(mean): --  RR: 16 (02 Dec 2019 04:59) (16 - 18)  SpO2: 99% (02 Dec 2019 04:59) (99% - 100%)    Physical Exam: PHYSICAL EXAM:  	Constitutional: NAD, eating food  	Eyes: PERRLA, EOMI  	ENMT: NCAT  	Back: no ttp  	Respiratory: CTA b/l  	Cardiovascular: RRR, normal S1/S2, no m/r/g  	Gastrointestinal: soft, nt, nd  	Extremities: no leg edema  	Vascular: 2+ pulses b/l  	Neurological: A&Ox3, no weakness/numbness, sensation intact  Skin: no rashes    LABS                        13.1   3.63  )-----------( 288      ( 02 Dec 2019 06:56 )             41.7                         12.5   4.43  )-----------( 278      ( 01 Dec 2019 13:50 )             38.5     12-02    137  |  103  |  5<L>  ----------------------------<  111<H>  4.2   |  19<L>  |  0.77  12-01    141  |  103  |  4<L>  ----------------------------<  100<H>  3.1<L>   |  21<L>  |  0.85    Ca    10.1      02 Dec 2019 06:56  Ca    9.7      01 Dec 2019 13:50  Phos  4.1     12-02  Mg     2.0     12-02    TPro  7.7  /  Alb  4.6  /  TBili  0.2  /  DBili  x   /  AST  12  /  ALT  4   /  AlkPhos  33<L>  12-01    PT/INR - ( 02 Dec 2019 06:56 )   PT: 13.1 SEC;   INR: 1.14          PTT - ( 01 Dec 2019 13:50 )  PTT:30.9 SEC            12-01-19 @ 13:50 - VBG - pH: 7.42  | pCO2: 41    | pO2: 28    | Lactate: 1.6        RADIOLOGY & ADDITIONAL TESTS:    Imaging Personally Reviewed:    Consultant(s) Notes Reviewed:      Care Discussed with Consultants/Other Providers:

## 2019-12-02 NOTE — PROGRESS NOTE ADULT - ATTENDING COMMENTS
Patient seen and examined. Case discussed with Dr. Neumann. I agree with the findings and the plan above.  Briefly, patient a 44 year old lady with lung cancer that is metastatic to her brain s/p whole brain radiation currently on Tagrisso p/w progressive headaches. She also c/o nausea, decreased appetite and weight loss with decreased performance status. On physical exam, no acute neurological deficits. CT brain shows areas with edema. Patient's  reports recent steroid taper.   Patient with #intractable headache in the setting of #lung cancer metastatic to the brain  Follow up MRI of the head, Neurosurgery, RT and Onc  Continue steroids/Keppra

## 2019-12-02 NOTE — PROGRESS NOTE ADULT - PROBLEM SELECTOR PLAN 1
Patient has been getting intermittent headaches for a few weeks that usually responds to excedrin. Likely 2/2 to brain mets from primary lung cancer.   -CT head showing areas of edema  -Dr. Shi (Oncologist) has been consulted, will hold Tagrisso for now  -NSG recs appreciated  -F/U MR head  -Pain control  -Decadron 4mg q6  -F/U Neuro-onc recs

## 2019-12-02 NOTE — CONSULT NOTE ADULT - ASSESSMENT
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  She has XRT and is also is being treated with tagrisso.  She is admitted with headaches.  CT head noted some edema and possible metastases but not compared to prior imaging. Brain MRI has been ordered. patient is known to neuro-onc, Dr. Anguiano and he was contacted and will be seeing the patient today.  She is on steroids.  Continue with pain management for now.  tagrisso on hold for now here in the hospital.

## 2019-12-02 NOTE — CONSULT NOTE ADULT - SUBJECTIVE AND OBJECTIVE BOX
Chief Complaint:  Patient is a 44y old  Female who presents with a chief complaint of Headache (01 Dec 2019 18:53)      HPI: patient known to Dr. Jernigan.  She has known metastatic NSCLC to brain.  She has had RT and as it is EGFR mutated she is being treated with tagrisso.  She is admitted here to the hospital with headaches.  She had a head CT and results noted but it was not compared to prior so unknown if progression or not.  She is getting Oxycodone for the headache    Medications:  acetaminophen   Tablet .. 650 milliGRAM(s) Oral every 6 hours PRN  ALBUTerol    0.083% 2.5 milliGRAM(s) Nebulizer every 6 hours PRN  ALBUTerol    90 MICROgram(s) HFA Inhaler 1 Puff(s) Inhalation every 4 hours PRN  dexAMETHasone     Tablet 4 milliGRAM(s) Oral every 6 hours  influenza   Vaccine 0.5 milliLiter(s) IntraMuscular once  levETIRAcetam 500 milliGRAM(s) Oral two times a day  montelukast  Chewable 10 milliGRAM(s) Oral daily        Allergies:  No Known Allergies    FAMILY HISTORY/Social history reviewed and no change    PAST MEDICAL & SURGICAL HISTORY:  Asthma, unspecified asthma severity, unspecified whether complicated, unspecified whether persistent  Gastroesophageal reflux disease, esophagitis presence not specified  Malignant neoplasm of lung, unspecified laterality, unspecified part of lung  No significant past surgical history      REVIEW OF SYSTEMS      General: ha fatigue.  Appetite is decreased but she is eating.  No weight loss.  She gets occasional sweats.  No fevers    Skin/Breast: No rash, bruising, or itch  	  Ophthalmologic: No vision changes  	  ENMT:	No hearing loss, nosebleeds, sore throat    Respiratory and Thorax: No cough, wheeze, hemoptysis, SOB  	  Cardiovascular:	No CP or palpitations    Gastrointestinal:	No Vomiting, diarrhea, constipation, BRBPR.  Occasional mild nausea    Genitourinary:	No dysuria or hematura    Musculoskeletal:	 No back pain, leg pain, or swelling    Neurological:	No dizziness, numbness, tingling    Psychiatric:	No insomnia    Vitals:  Vital Signs Last 24 Hrs  T(C): 36.6 (02 Dec 2019 04:59), Max: 37 (01 Dec 2019 17:05)  T(F): 97.9 (02 Dec 2019 04:59), Max: 98.6 (01 Dec 2019 17:05)  HR: 71 (02 Dec 2019 04:59) (71 - 88)  BP: 105/67 (02 Dec 2019 04:59) (98/57 - 120/63)  BP(mean): --  RR: 16 (02 Dec 2019 04:59) (16 - 18)  SpO2: 99% (02 Dec 2019 04:59) (99% - 100%)    Pex:  alert NAD  EOMI anicteric sclera  Neck Supple No LNA  Cv s1 S2 RRR  Lungs clear B/L  abd soft NT ND +BS  No LE edema or tenderness    Labs:                        12.5   4.43  )-----------( 278      ( 01 Dec 2019 13:50 )             38.5     CBC Full  -  ( 01 Dec 2019 13:50 )  WBC Count : 4.43 K/uL  RBC Count : 4.08 M/uL  Hemoglobin : 12.5 g/dL  Hematocrit : 38.5 %  Platelet Count - Automated : 278 K/uL  Mean Cell Volume : 94.4 fL  Mean Cell Hemoglobin : 30.6 pg  Mean Cell Hemoglobin Concentration : 32.5 %  Auto Neutrophil # : 3.19 K/uL  Auto Lymphocyte # : 0.71 K/uL  Auto Monocyte # : 0.50 K/uL  Auto Eosinophil # : 0.01 K/uL  Auto Basophil # : 0.01 K/uL  Auto Neutrophil % : 72.1 %  Auto Lymphocyte % : 16.0 %  Auto Monocyte % : 11.3 %  Auto Eosinophil % : 0.2 %  Auto Basophil % : 0.2 %    12-01    141  |  103  |  4<L>  ----------------------------<  100<H>  3.1<L>   |  21<L>  |  0.85    Ca    9.7      01 Dec 2019 13:50    TPro  7.7  /  Alb  4.6  /  TBili  0.2  /  DBili  x   /  AST  12  /  ALT  4   /  AlkPhos  33<L>  12-01    PT/INR - ( 02 Dec 2019 06:56 )   PT: 13.1 SEC;   INR: 1.14          PTT - ( 01 Dec 2019 13:50 )  PTT:30.9 SEC  [id number]

## 2019-12-02 NOTE — CHART NOTE - NSCHARTNOTEFT_GEN_A_CORE
NEURO-ONCOLOGY: NUNOULOS     CC: BRAIN METASTASES FROM EGFR MUTANT ADENOCARCINOMA OF THE LUNG    PATIENT SEEN & EXAMINED  CASE DISCUSSED WITH ONCOLOGY (MYRIAM PARRISH)  IMAGING REVIEWED  LABS REVIEWED  SUNRISE CHART REVIEWED    BRIEFLY, 43 yo RH woman with PMHx EGFR mutant (exon 19) lung cancer, last seen by me on 9/5/19 for brain metastases which were highly suspicious for CSF involvement.    CSF was sent on 9/16/19 and was suspicious, but not diagnostic for, leptomeningeal carcinomatosis. I spoke with patient's  at [phone] and strongly recommended a repeat LP. The patient declined, which I reconfirmed today.    Today, in [facility], she reports she has always had mild headaches, that typically resolve with excedrin, but that for the two days prior to admission, these headaches did not resolve with excedrin, to the point that they were much more severe (10/10) and she came to ED. She denies nausea to me (but admitted to some nausea for ED team), no vomiting, no double vision, no trouble moving, no focal neurologic complaints. She has been taking her Tagresso 80mg daily without trouble and continues to receive this medication while in the hospital.    Of note, her brain metastases were found on 7/28/19, after she collapsed at work. Imaging from prior care is available in the computer, under her maiden name of Nomi, not Marcell.    She is taking Keppra 500-500  she was treated with Dexamethasone 10mg once in the ED.    EXAM  Alert and oriented to date, did not know hospital name. fluent, nonaphasic speech.  full EOM  facies symmetrical  no cranial nerve deficits.  no drift  full strength    LABS  CBC shows mild anemia, comprehensive unremarkable.    IMAGING   10/9/19 MRI - LS SPINE WITH CONTRAST -- no evidence of disease.  CT - 8/8/19 - I see old mets in comparison to 12/1/19 CT head as well as potentially new met on the right posterior frontal region, manifested as hypodensity.    IMPRESSION/PLAN  43 yo RH woman with EGFR mutant lung adenocarcinoma met to brain, now with headache    HEADACHE -- Resolved this AM, she tells me. CT not worrsiome for acute bleeding.    BRAIN METS -- I think Ri with contrast will help elucidate new vs old disease. Note that her old imaging is under her maiden name, Nomi.    DISPO -- please consult house neurology for urgent issues. I would like to see her in my office within 7-10 days of discharge. She may call [phone] for appointment.

## 2019-12-02 NOTE — PROGRESS NOTE ADULT - ASSESSMENT
45 yo F w/ h/o asthma, GERD, metastatic lung ca with mets to brain on oral chemo p/w 2 days of HA unresponsive to excedrin with CT head showing areas of edema.

## 2019-12-02 NOTE — PROGRESS NOTE ADULT - PROBLEM SELECTOR PLAN 2
Patient on Tagrisso 80 mg qd as per oncologist, has been doing well  -Per Onc, hold Tagrisso  -C/w albuterol, montelukast

## 2019-12-03 LAB
ANION GAP SERPL CALC-SCNC: 12 MMO/L — SIGNIFICANT CHANGE UP (ref 7–14)
BUN SERPL-MCNC: 8 MG/DL — SIGNIFICANT CHANGE UP (ref 7–23)
CALCIUM SERPL-MCNC: 9.6 MG/DL — SIGNIFICANT CHANGE UP (ref 8.4–10.5)
CHLORIDE SERPL-SCNC: 106 MMOL/L — SIGNIFICANT CHANGE UP (ref 98–107)
CO2 SERPL-SCNC: 20 MMOL/L — LOW (ref 22–31)
CREAT SERPL-MCNC: 0.76 MG/DL — SIGNIFICANT CHANGE UP (ref 0.5–1.3)
GLUCOSE BLDC GLUCOMTR-MCNC: 112 MG/DL — HIGH (ref 70–99)
GLUCOSE BLDC GLUCOMTR-MCNC: 116 MG/DL — HIGH (ref 70–99)
GLUCOSE BLDC GLUCOMTR-MCNC: 116 MG/DL — HIGH (ref 70–99)
GLUCOSE BLDC GLUCOMTR-MCNC: 119 MG/DL — HIGH (ref 70–99)
GLUCOSE SERPL-MCNC: 115 MG/DL — HIGH (ref 70–99)
HBA1C BLD-MCNC: 5.4 % — SIGNIFICANT CHANGE UP (ref 4–5.6)
HCT VFR BLD CALC: 35.7 % — SIGNIFICANT CHANGE UP (ref 34.5–45)
HGB BLD-MCNC: 11.6 G/DL — SIGNIFICANT CHANGE UP (ref 11.5–15.5)
MAGNESIUM SERPL-MCNC: 2.2 MG/DL — SIGNIFICANT CHANGE UP (ref 1.6–2.6)
MCHC RBC-ENTMCNC: 30.1 PG — SIGNIFICANT CHANGE UP (ref 27–34)
MCHC RBC-ENTMCNC: 32.5 % — SIGNIFICANT CHANGE UP (ref 32–36)
MCV RBC AUTO: 92.7 FL — SIGNIFICANT CHANGE UP (ref 80–100)
NRBC # FLD: 0 K/UL — SIGNIFICANT CHANGE UP (ref 0–0)
PHOSPHATE SERPL-MCNC: 4.7 MG/DL — HIGH (ref 2.5–4.5)
PLATELET # BLD AUTO: 274 K/UL — SIGNIFICANT CHANGE UP (ref 150–400)
PMV BLD: 11.4 FL — SIGNIFICANT CHANGE UP (ref 7–13)
POTASSIUM SERPL-MCNC: 4.2 MMOL/L — SIGNIFICANT CHANGE UP (ref 3.5–5.3)
POTASSIUM SERPL-SCNC: 4.2 MMOL/L — SIGNIFICANT CHANGE UP (ref 3.5–5.3)
RBC # BLD: 3.85 M/UL — SIGNIFICANT CHANGE UP (ref 3.8–5.2)
RBC # FLD: 13.8 % — SIGNIFICANT CHANGE UP (ref 10.3–14.5)
SODIUM SERPL-SCNC: 138 MMOL/L — SIGNIFICANT CHANGE UP (ref 135–145)
WBC # BLD: 7.04 K/UL — SIGNIFICANT CHANGE UP (ref 3.8–10.5)
WBC # FLD AUTO: 7.04 K/UL — SIGNIFICANT CHANGE UP (ref 3.8–10.5)

## 2019-12-03 PROCEDURE — 99233 SBSQ HOSP IP/OBS HIGH 50: CPT | Mod: GC

## 2019-12-03 PROCEDURE — 70553 MRI BRAIN STEM W/O & W/DYE: CPT | Mod: 26

## 2019-12-03 RX ADMIN — LEVETIRACETAM 500 MILLIGRAM(S): 250 TABLET, FILM COATED ORAL at 18:13

## 2019-12-03 RX ADMIN — MONTELUKAST 10 MILLIGRAM(S): 4 TABLET, CHEWABLE ORAL at 13:16

## 2019-12-03 RX ADMIN — Medication 4 MILLIGRAM(S): at 13:16

## 2019-12-03 RX ADMIN — LEVETIRACETAM 500 MILLIGRAM(S): 250 TABLET, FILM COATED ORAL at 05:44

## 2019-12-03 RX ADMIN — Medication 4 MILLIGRAM(S): at 05:45

## 2019-12-03 RX ADMIN — Medication 4 MILLIGRAM(S): at 18:13

## 2019-12-03 RX ADMIN — Medication 4 MILLIGRAM(S): at 23:23

## 2019-12-03 NOTE — PROGRESS NOTE ADULT - PROBLEM SELECTOR PLAN 1
Patient has been getting intermittent headaches for a few weeks that usually responds to excedrin. Likely 2/2 to brain mets from primary lung cancer.   -CT head showing areas of edema  -Dr. Shi (Oncologist) has been consulted, will hold Tagrisso for now  -NSG recs appreciated  -F/U MR head  -Pain control, oxy PRN  -Decadron 4mg q6  -Patient to see Neuro-onc as outpt, appreciate recs

## 2019-12-03 NOTE — PROGRESS NOTE ADULT - SUBJECTIVE AND OBJECTIVE BOX
Toni Neumann, PGY-1  [phone]    Patient is a 44y old  Female who presents with a chief complaint of Headache (02 Dec 2019 09:45)      SUBJECTIVE/OVERNIGHT EVENTS: No acute events overnight. Patient seen and examined at bedside this AM.     MEDICATIONS  (STANDING):  dexAMETHasone     Tablet 4 milliGRAM(s) Oral every 6 hours  dextrose 5%. 1000 milliLiter(s) (50 mL/Hr) IV Continuous <Continuous>  dextrose 50% Injectable 12.5 Gram(s) IV Push once  dextrose 50% Injectable 25 Gram(s) IV Push once  dextrose 50% Injectable 25 Gram(s) IV Push once  influenza   Vaccine 0.5 milliLiter(s) IntraMuscular once  insulin lispro (HumaLOG) corrective regimen sliding scale   SubCutaneous three times a day before meals  insulin lispro (HumaLOG) corrective regimen sliding scale   SubCutaneous at bedtime  levETIRAcetam 500 milliGRAM(s) Oral two times a day  montelukast  Chewable 10 milliGRAM(s) Oral daily    MEDICATIONS  (PRN):  acetaminophen   Tablet .. 650 milliGRAM(s) Oral every 6 hours PRN Moderate Pain (4 - 6)  ALBUTerol    0.083% 2.5 milliGRAM(s) Nebulizer every 6 hours PRN Shortness of Breath and/or Wheezing  ALBUTerol    90 MICROgram(s) HFA Inhaler 1 Puff(s) Inhalation every 4 hours PRN Shortness of Breath and/or Wheezing  aluminum hydroxide/magnesium hydroxide/simethicone Suspension 30 milliLiter(s) Oral every 6 hours PRN Dyspepsia  dextrose 40% Gel 15 Gram(s) Oral once PRN Blood Glucose LESS THAN 70 milliGRAM(s)/deciliter  glucagon  Injectable 1 milliGRAM(s) IntraMuscular once PRN Glucose LESS THAN 70 milligrams/deciliter  oxyCODONE    IR 5 milliGRAM(s) Oral every 6 hours PRN Severe Pain (7 - 10)    CAPILLARY BLOOD GLUCOSE      POCT Blood Glucose.: 111 mg/dL (02 Dec 2019 22:11)  POCT Blood Glucose.: 113 mg/dL (02 Dec 2019 17:10)  POCT Blood Glucose.: 104 mg/dL (02 Dec 2019 12:14)    I&O's Summary        Vital Signs Last 24 Hrs  T(C): 36.6 (03 Dec 2019 05:42), Max: 36.6 (02 Dec 2019 15:25)  T(F): 97.8 (03 Dec 2019 05:42), Max: 97.9 (02 Dec 2019 15:25)  HR: 72 (03 Dec 2019 05:42) (72 - 88)  BP: 100/71 (03 Dec 2019 05:42) (100/71 - 108/74)  BP(mean): --  RR: 18 (03 Dec 2019 05:42) (17 - 18)  SpO2: 100% (03 Dec 2019 05:42) (99% - 100%)    Physical Exam: PHYSICAL EXAM:  	Constitutional: NAD, eating food  	Eyes: PERRLA, EOMI  	ENMT: NCAT  	Back: no ttp  	Respiratory: CTA b/l  	Cardiovascular: RRR, normal S1/S2, no m/r/g  	Gastrointestinal: soft, nt, nd  	Extremities: no leg edema  	Vascular: 2+ pulses b/l  	Neurological: A&Ox3, no weakness/numbness, sensation intact  Skin: no rashes    LABS                        13.1   3.63  )-----------( 288      ( 02 Dec 2019 06:56 )             41.7                         12.5   4.43  )-----------( 278      ( 01 Dec 2019 13:50 )             38.5     12-02    137  |  103  |  5<L>  ----------------------------<  111<H>  4.2   |  19<L>  |  0.77  12-01    141  |  103  |  4<L>  ----------------------------<  100<H>  3.1<L>   |  21<L>  |  0.85    Ca    10.1      02 Dec 2019 06:56  Ca    9.7      01 Dec 2019 13:50  Phos  4.1     12-02  Mg     2.0     12-02    TPro  7.7  /  Alb  4.6  /  TBili  0.2  /  DBili  x   /  AST  12  /  ALT  4   /  AlkPhos  33<L>  12-01    PT/INR - ( 02 Dec 2019 06:56 )   PT: 13.1 SEC;   INR: 1.14          PTT - ( 01 Dec 2019 13:50 )  PTT:30.9 SEC                RADIOLOGY & ADDITIONAL TESTS:    Imaging Personally Reviewed:    Consultant(s) Notes Reviewed:      Care Discussed with Consultants/Other Providers: Toni Neumann, PGY-1  [phone]    Patient is a 44y old  Female who presents with a chief complaint of Headache (02 Dec 2019 09:45)      SUBJECTIVE/OVERNIGHT EVENTS: No acute events overnight. Patient seen and examined at bedside this AM. No headaches this morning. Patient walking around, ambulating, has an appetite, has been eating. Denies vision changes, hearing loss, bleeding, dysuria, hematuria, f/c/n/v/d/c.     MEDICATIONS  (STANDING):  dexAMETHasone     Tablet 4 milliGRAM(s) Oral every 6 hours  dextrose 5%. 1000 milliLiter(s) (50 mL/Hr) IV Continuous <Continuous>  dextrose 50% Injectable 12.5 Gram(s) IV Push once  dextrose 50% Injectable 25 Gram(s) IV Push once  dextrose 50% Injectable 25 Gram(s) IV Push once  influenza   Vaccine 0.5 milliLiter(s) IntraMuscular once  insulin lispro (HumaLOG) corrective regimen sliding scale   SubCutaneous three times a day before meals  insulin lispro (HumaLOG) corrective regimen sliding scale   SubCutaneous at bedtime  levETIRAcetam 500 milliGRAM(s) Oral two times a day  montelukast  Chewable 10 milliGRAM(s) Oral daily    MEDICATIONS  (PRN):  acetaminophen   Tablet .. 650 milliGRAM(s) Oral every 6 hours PRN Moderate Pain (4 - 6)  ALBUTerol    0.083% 2.5 milliGRAM(s) Nebulizer every 6 hours PRN Shortness of Breath and/or Wheezing  ALBUTerol    90 MICROgram(s) HFA Inhaler 1 Puff(s) Inhalation every 4 hours PRN Shortness of Breath and/or Wheezing  aluminum hydroxide/magnesium hydroxide/simethicone Suspension 30 milliLiter(s) Oral every 6 hours PRN Dyspepsia  dextrose 40% Gel 15 Gram(s) Oral once PRN Blood Glucose LESS THAN 70 milliGRAM(s)/deciliter  glucagon  Injectable 1 milliGRAM(s) IntraMuscular once PRN Glucose LESS THAN 70 milligrams/deciliter  oxyCODONE    IR 5 milliGRAM(s) Oral every 6 hours PRN Severe Pain (7 - 10)    CAPILLARY BLOOD GLUCOSE      POCT Blood Glucose.: 111 mg/dL (02 Dec 2019 22:11)  POCT Blood Glucose.: 113 mg/dL (02 Dec 2019 17:10)  POCT Blood Glucose.: 104 mg/dL (02 Dec 2019 12:14)    I&O's Summary        Vital Signs Last 24 Hrs  T(C): 36.6 (03 Dec 2019 05:42), Max: 36.6 (02 Dec 2019 15:25)  T(F): 97.8 (03 Dec 2019 05:42), Max: 97.9 (02 Dec 2019 15:25)  HR: 72 (03 Dec 2019 05:42) (72 - 88)  BP: 100/71 (03 Dec 2019 05:42) (100/71 - 108/74)  BP(mean): --  RR: 18 (03 Dec 2019 05:42) (17 - 18)  SpO2: 100% (03 Dec 2019 05:42) (99% - 100%)    Physical Exam: PHYSICAL EXAM:  	Constitutional: NAD, eating food  	Eyes: PERRLA, EOMI  	ENMT: NCAT  	Back: no ttp  	Respiratory: CTA b/l  	Cardiovascular: RRR, normal S1/S2, no m/r/g  	Gastrointestinal: soft, nt, nd  	Extremities: no leg edema  	Vascular: 2+ pulses b/l  	Neurological: A&Ox3, no weakness/numbness, sensation intact  Skin: no rashes    LABS                        13.1   3.63  )-----------( 288      ( 02 Dec 2019 06:56 )             41.7                         12.5   4.43  )-----------( 278      ( 01 Dec 2019 13:50 )             38.5     12-02    137  |  103  |  5<L>  ----------------------------<  111<H>  4.2   |  19<L>  |  0.77  12-01    141  |  103  |  4<L>  ----------------------------<  100<H>  3.1<L>   |  21<L>  |  0.85    Ca    10.1      02 Dec 2019 06:56  Ca    9.7      01 Dec 2019 13:50  Phos  4.1     12-02  Mg     2.0     12-02    TPro  7.7  /  Alb  4.6  /  TBili  0.2  /  DBili  x   /  AST  12  /  ALT  4   /  AlkPhos  33<L>  12-01    PT/INR - ( 02 Dec 2019 06:56 )   PT: 13.1 SEC;   INR: 1.14          PTT - ( 01 Dec 2019 13:50 )  PTT:30.9 SEC                RADIOLOGY & ADDITIONAL TESTS:    Imaging Personally Reviewed:    Consultant(s) Notes Reviewed:      Care Discussed with Consultants/Other Providers:

## 2019-12-03 NOTE — CHART NOTE - NSCHARTNOTEFT_GEN_A_CORE
MRI reviewed, multiple areas of intracranial metastasis, similar to MRI from July (Under patient's name Olya MARTIN), awaiting radiology to review both imaging. Per records patient had RT to the brain already. No surgical intervention to these subcentimeter lesions. D/w Dr. Roth. Given MRI read of leptomeningeal disease, f/u with Dr. Valencia regarding results of MRI as previous LP was suspicious for malignant cells and needed to be repeated however patient refused.     Page PRN questions

## 2019-12-03 NOTE — PROGRESS NOTE ADULT - ATTENDING COMMENTS
Patient seen and examined. Case discussed with Dr. Neumann. I agree with the findings and the plan above.  Briefly, patient a 44 year old lady with lung cancer that is metastatic to her brain s/p whole brain radiation currently on Tagrisso p/w progressive headaches. She also c/o nausea, decreased appetite and weight loss with decreased performance status. On physical exam, no acute neurological deficits. CT brain shows areas with edema. Patient's  reports recent steroid taper.   Patient with #intractable headache in the setting of #lung cancer metastatic to the brain  MRI of the head- Multiple cerebral metastases with mild vasogenic edema. Several lesions are along the cerebellar surface and may be leptomeningeal in location.   [facility] Neurosurgery, Neuro Onc- if no plan for inpt intervention, then dc planning   Continue steroids/Keppra .

## 2019-12-04 ENCOUNTER — TRANSCRIPTION ENCOUNTER (OUTPATIENT)
Age: 44
End: 2019-12-04

## 2019-12-04 VITALS
RESPIRATION RATE: 18 BRPM | DIASTOLIC BLOOD PRESSURE: 70 MMHG | OXYGEN SATURATION: 100 % | HEART RATE: 64 BPM | SYSTOLIC BLOOD PRESSURE: 102 MMHG | TEMPERATURE: 98 F

## 2019-12-04 DIAGNOSIS — Z71.89 OTHER SPECIFIED COUNSELING: ICD-10-CM

## 2019-12-04 LAB
GLUCOSE BLDC GLUCOMTR-MCNC: 101 MG/DL — HIGH (ref 70–99)
GLUCOSE BLDC GLUCOMTR-MCNC: 117 MG/DL — HIGH (ref 70–99)

## 2019-12-04 PROCEDURE — 99239 HOSP IP/OBS DSCHRG MGMT >30: CPT | Mod: GC

## 2019-12-04 RX ORDER — DEXAMETHASONE 0.5 MG/5ML
1 ELIXIR ORAL
Qty: 11 | Refills: 0
Start: 2019-12-04 | End: 2019-12-10

## 2019-12-04 RX ORDER — DEXAMETHASONE 0.5 MG/5ML
1 ELIXIR ORAL
Qty: 0 | Refills: 0 | DISCHARGE

## 2019-12-04 RX ADMIN — Medication 4 MILLIGRAM(S): at 06:21

## 2019-12-04 RX ADMIN — MONTELUKAST 10 MILLIGRAM(S): 4 TABLET, CHEWABLE ORAL at 11:46

## 2019-12-04 RX ADMIN — LEVETIRACETAM 500 MILLIGRAM(S): 250 TABLET, FILM COATED ORAL at 06:20

## 2019-12-04 RX ADMIN — Medication 4 MILLIGRAM(S): at 11:46

## 2019-12-04 NOTE — DISCHARGE NOTE PROVIDER - NSDCCPCAREPLAN_GEN_ALL_CORE_FT
PRINCIPAL DISCHARGE DIAGNOSIS  Diagnosis: Headache  Assessment and Plan of Treatment: You came in due to headaches that would not go away when you would take excedrin. We got a Cat Scan of your head that showed some swelling (edema). You were placed on steroids and your headaches improved very much. We also got an MRI that showed that some lesions had decreased in size and some had increased in size. We recommended you get a lumbar puncture, however you did not want to.  If you begin to have worsening headaches, please return to the ER immediately.    Please follow up with Dr. Shi on 12/11/19 at 9:45 AM  Please follow up with Dr. Brown in one week. Please call [phone] to make an appointment. PRINCIPAL DISCHARGE DIAGNOSIS  Diagnosis: Headache  Assessment and Plan of Treatment: You came in due to headaches that would not go away when you would take excedrin. We got a Cat Scan of your head that showed some swelling (edema). You were placed on steroids and your headaches improved very much. We also got an MRI that showed that some lesions had decreased in size and some had increased in size. We recommended you get a lumbar puncture, however you did not want to.  If you begin to have worsening headaches, please return to the ER immediately.    Please follow up with Dr. Shi on 12/11/19 at 9:45 AM  Please follow up with Dr. Brown in one week. Please call [phone] to make an appointment.   Please take your dexamethasone as prescribed. Take 2 tabs for more days 12/5-12/8. take 1 tab from 12/9-12/11 and then stop and see your oncologist.

## 2019-12-04 NOTE — PROGRESS NOTE ADULT - PROBLEM SELECTOR PLAN 1
Patient has been getting intermittent headaches for a few weeks that usually responds to excedrin. Likely 2/2 to brain mets from primary lung cancer.   -MRI showing mixed response. Neuro-onc would like LP for possible CSF dissemination and possible rad-onc c/s.   -CT head showing areas of edema  -Dr. Shi (Oncologist) has been consulted, will hold Tagrisso for now  -NSG recs appreciated  -Pain control, oxy PRN (has not required it)  -Decadron 4mg q6  -Patient to see Neuro-onc as outpt, appreciate recs Patient has been getting intermittent headaches for a few weeks that usually responds to excedrin. Likely 2/2 to brain mets from primary lung cancer.   -MRI showing mixed response. Neuro-onc would like LP for possible CSF dissemination and possible rad-onc c/s. Patient refusing.   -CT head showing areas of edema  -Dr. Shi (Oncologist) has been consulted, will hold Tagrisso for now  -[facility] recs appreciated  -Pain control, oxy PRN (has not required it)  -Decadron 4mg q6  -Patient to see Neuro-onc as outpt, appreciate recs

## 2019-12-04 NOTE — DISCHARGE NOTE PROVIDER - PROVIDER TOKENS
PROVIDER:[TOKEN:[[id number]],FOLLOWUP:[1 week],ESTABLISHEDPATIENT:[T]],PROVIDER:[TOKEN:[[id number]],FOLLOWUP:[1 week],ESTABLISHEDPATIENT:[T]],PROVIDER:[TOKEN:[[id number]],FOLLOWUP:[1 week],ESTABLISHEDPATIENT:[T]]

## 2019-12-04 NOTE — DISCHARGE NOTE PROVIDER - CARE PROVIDERS DIRECT ADDRESSES
,DirectAddress_Unknown,[email or web address]

## 2019-12-04 NOTE — PROGRESS NOTE ADULT - ATTENDING COMMENTS
Patient seen and examined. Case discussed with Dr. Neumann. I agree with the findings and the plan above.  Briefly, patient a 44 year old lady with lung cancer that is metastatic to her brain s/p whole brain radiation currently on Tagrisso p/w progressive headaches, nausea, decreased appetite and weight loss. On physical exam, no acute neurological deficits. CT brain shows areas with edema.    Patient with #intractable headache in the setting of #lung cancer metastatic to the brain  MRI of the head- Multiple cerebral metastases with mild vasogenic edema. Several lesions are along the cerebellar surface and may be leptomeningeal in location. Continue steroids/Keppra   pt refusing LP at this time as advised by Neuro Onc,  as it caused her too pain  and she does not think she needs despite explaining to her the need for  LP. She said she will follow with her oncologist Dr Shi and discuss the need for an LP with her   Team dw Dr Kovacs-  Steroid taper advised, Pt to follow up with Oncology as outpt   [facility] Oncology regarding restarting PO Tagrisso on dc   DC planning 32mins Patient seen and examined. Case discussed with Dr. Neumann. I agree with the findings and the plan above.  Briefly, patient a 44 year old lady with lung cancer that is metastatic to her brain s/p whole brain radiation currently on Tagrisso p/w progressive headaches, nausea, decreased appetite and weight loss. On physical exam, no acute neurological deficits. CT brain shows areas with edema.    Patient with #intractable headache in the setting of #lung cancer metastatic to the brain  MRI of the head- Multiple cerebral metastases with mild vasogenic edema. Several lesions are along the cerebellar surface and may be leptomeningeal in location. Continue steroids/Keppra   pt refusing LP at this time as advised by Neuro Onc,  as it caused her too pain  and she does not think she needs despite explaining to her the need for  LP. She said she will follow with her oncologist Dr Shi and discuss the need for an LP with her   Team dw Dr Kovacs-  Steroid taper advised, Pt to follow up with Oncology as outpt .Pt has appointment with Onc in  a week  FU Oncology regarding restarting PO Tagrisso on dc   DC planning 32mins

## 2019-12-04 NOTE — PROGRESS NOTE ADULT - SUBJECTIVE AND OBJECTIVE BOX
Patient is a 44y old  Female who presents with a chief complaint of Headache (04 Dec 2019 07:03)    Says that headaches are much better.  Appetite also better as her taste for food is better.  She is ambulating.  No N/V.  No CP or SOB.  No fevers      Medication:   acetaminophen   Tablet .. 650 milliGRAM(s) Oral every 6 hours PRN  ALBUTerol    0.083% 2.5 milliGRAM(s) Nebulizer every 6 hours PRN  ALBUTerol    90 MICROgram(s) HFA Inhaler 1 Puff(s) Inhalation every 4 hours PRN  aluminum hydroxide/magnesium hydroxide/simethicone Suspension 30 milliLiter(s) Oral every 6 hours PRN  dexAMETHasone     Tablet 4 milliGRAM(s) Oral every 6 hours  dextrose 40% Gel 15 Gram(s) Oral once PRN  dextrose 5%. 1000 milliLiter(s) IV Continuous <Continuous>  dextrose 50% Injectable 12.5 Gram(s) IV Push once  dextrose 50% Injectable 25 Gram(s) IV Push once  dextrose 50% Injectable 25 Gram(s) IV Push once  glucagon  Injectable 1 milliGRAM(s) IntraMuscular once PRN  influenza   Vaccine 0.5 milliLiter(s) IntraMuscular once  insulin lispro (HumaLOG) corrective regimen sliding scale   SubCutaneous three times a day before meals  insulin lispro (HumaLOG) corrective regimen sliding scale   SubCutaneous at bedtime  levETIRAcetam 500 milliGRAM(s) Oral two times a day  montelukast  Chewable 10 milliGRAM(s) Oral daily  oxyCODONE    IR 5 milliGRAM(s) Oral every 6 hours PRN      Physical exam    T(C): 36.7 (12-04-19 @ 06:18), Max: 36.7 (12-04-19 @ 06:18)  HR: 89 (12-04-19 @ 06:18) (67 - 89)  BP: 112/74 (12-04-19 @ 06:18) (104/72 - 114/83)  RR: 16 (12-04-19 @ 06:18) (16 - 18)  SpO2: 98% (12-04-19 @ 06:18) (98% - 100%)  Wt(kg): --    alert NAD  EOMI anicteric sclera  Neck No LNA  Cv RRR  resp non labored    Labs                              11.6   7.04  )-----------( 274      ( 03 Dec 2019 05:25 )             35.7       12-03    138  |  106  |  8   ----------------------------<  115<H>  4.2   |  20<L>  |  0.76    Ca    9.6      03 Dec 2019 05:25  Phos  4.7     12-03  Mg     2.2     12-03            [id number]

## 2019-12-04 NOTE — DISCHARGE NOTE PROVIDER - NSDCMRMEDTOKEN_GEN_ALL_CORE_FT
albuterol 0.63 mg/3 mL (0.021%) inhalation solution: 3 milliliter(s) inhaled 3 times a day  Compazine 10 mg oral tablet: 1 tab(s) orally every 6 hours, As Needed for nasuea  Decadron 4 mg oral tablet: 1 tab(s) orally once a day  Keppra 500 mg oral tablet: 1 tab(s) orally 2 times a day  montelukast 4 mg oral tablet, chewable: 1 tab(s) orally once a day  [id number] - oral powder for reconstitution: 17 gram(s) orally once a day  Tagrisso 80 mg oral tablet: 1 tab(s) orally once a day albuterol 0.63 mg/3 mL (0.021%) inhalation solution: 3 milliliter(s) inhaled 3 times a day  Compazine 10 mg oral tablet: 1 tab(s) orally every 6 hours, As Needed for nasuea  Decadron 4 mg oral tablet: 1 tab(s) orally once a day  Keppra 500 mg oral tablet: 1 tab(s) orally 2 times a day  montelukast 4 mg oral tablet, chewable: 1 tab(s) orally once a day  [id number] - oral powder for reconstitution: 17 gram(s) orally once a day  Tagrisso 80 mg oral tablet: 1 tab(s) orally once a day albuterol 0.63 mg/3 mL (0.021%) inhalation solution: 3 milliliter(s) inhaled 3 times a day  Compazine 10 mg oral tablet: 1 tab(s) orally every 6 hours, As Needed for nasuea  dexamethasone 2 mg oral tablet: 1 tab(s) orally once a day   Keppra 500 mg oral tablet: 1 tab(s) orally 2 times a day  montelukast 4 mg oral tablet, chewable: 1 tab(s) orally once a day  [id number] - oral powder for reconstitution: 17 gram(s) orally once a day  Tagrisso 80 mg oral tablet: 1 tab(s) orally once a day albuterol 0.63 mg/3 mL (0.021%) inhalation solution: 3 milliliter(s) inhaled 3 times a day  Compazine 10 mg oral tablet: 1 tab(s) orally every 6 hours, As Needed for nasuea  dexamethasone 2 mg oral tablet: 1 tab(s) orally once a day   Keppra 500 mg oral tablet: 1 tab(s) orally 2 times a day  montelukast 4 mg oral tablet, chewable: 1 tab(s) orally once a day  [id number] - oral powder for reconstitution: 17 gram(s) orally once a day  Tagrisso 80 mg oral tablet: 1 tab(s) orally once a day

## 2019-12-04 NOTE — DISCHARGE NOTE NURSING/CASE MANAGEMENT/SOCIAL WORK - PATIENT PORTAL LINK FT
You can access the FollowMyHealth Patient Portal offered by [facility] by registering at the following website: [email or web address]. By joining [facility]’s FollowMyHealth portal, you will also be able to view your health information using other applications (apps) compatible with our system.

## 2019-12-04 NOTE — PROGRESS NOTE ADULT - SUBJECTIVE AND OBJECTIVE BOX
Toni Neumann, PGY-1  [phone]    Patient is a 44y old  Female who presents with a chief complaint of Headache (03 Dec 2019 07:02)      SUBJECTIVE/OVERNIGHT EVENTS: No acute events ON. Patient seen and examined at bedside in AM. MRI reviewed and spoke with Neuro-onc attending. He would like LP due to possible CSF dissemination.      MEDICATIONS  (STANDING):  dexAMETHasone     Tablet 4 milliGRAM(s) Oral every 6 hours  dextrose 5%. 1000 milliLiter(s) (50 mL/Hr) IV Continuous <Continuous>  dextrose 50% Injectable 12.5 Gram(s) IV Push once  dextrose 50% Injectable 25 Gram(s) IV Push once  dextrose 50% Injectable 25 Gram(s) IV Push once  influenza   Vaccine 0.5 milliLiter(s) IntraMuscular once  insulin lispro (HumaLOG) corrective regimen sliding scale   SubCutaneous three times a day before meals  insulin lispro (HumaLOG) corrective regimen sliding scale   SubCutaneous at bedtime  levETIRAcetam 500 milliGRAM(s) Oral two times a day  montelukast  Chewable 10 milliGRAM(s) Oral daily    MEDICATIONS  (PRN):  acetaminophen   Tablet .. 650 milliGRAM(s) Oral every 6 hours PRN Moderate Pain (4 - 6)  ALBUTerol    0.083% 2.5 milliGRAM(s) Nebulizer every 6 hours PRN Shortness of Breath and/or Wheezing  ALBUTerol    90 MICROgram(s) HFA Inhaler 1 Puff(s) Inhalation every 4 hours PRN Shortness of Breath and/or Wheezing  aluminum hydroxide/magnesium hydroxide/simethicone Suspension 30 milliLiter(s) Oral every 6 hours PRN Dyspepsia  dextrose 40% Gel 15 Gram(s) Oral once PRN Blood Glucose LESS THAN 70 milliGRAM(s)/deciliter  glucagon  Injectable 1 milliGRAM(s) IntraMuscular once PRN Glucose LESS THAN 70 milligrams/deciliter  oxyCODONE    IR 5 milliGRAM(s) Oral every 6 hours PRN Severe Pain (7 - 10)    CAPILLARY BLOOD GLUCOSE      POCT Blood Glucose.: 116 mg/dL (03 Dec 2019 21:47)  POCT Blood Glucose.: 112 mg/dL (03 Dec 2019 17:19)  POCT Blood Glucose.: 116 mg/dL (03 Dec 2019 12:33)  POCT Blood Glucose.: 119 mg/dL (03 Dec 2019 09:32)    I&O's Summary        Vital Signs Last 24 Hrs  T(C): 36.7 (04 Dec 2019 06:18), Max: 36.7 (04 Dec 2019 06:18)  T(F): 98 (04 Dec 2019 06:18), Max: 98 (04 Dec 2019 06:18)  HR: 89 (04 Dec 2019 06:18) (67 - 89)  BP: 112/74 (04 Dec 2019 06:18) (104/72 - 114/83)  BP(mean): --  RR: 16 (04 Dec 2019 06:18) (16 - 18)  SpO2: 98% (04 Dec 2019 06:18) (98% - 100%)    Physical Exam:   	Constitutional: NAD, eating food, ambulating  	Eyes: PERRLA, EOMI  	ENMT: NCAT  	Back: no ttp  	Respiratory: CTA b/l  	Cardiovascular: RRR, normal S1/S2, no m/r/g  	Gastrointestinal: soft, nt, nd  	Extremities: no leg edema  	Vascular: 2+ pulses b/l  	Neurological: A&Ox3, no weakness/numbness, sensation intact  Skin: no rashes    LABS                        11.6   7.04  )-----------( 274      ( 03 Dec 2019 05:25 )             35.7     12-03    138  |  106  |  8   ----------------------------<  115<H>  4.2   |  20<L>  |  0.76    Ca    9.6      03 Dec 2019 05:25  Phos  4.7     12-03  Mg     2.2     12-03                      RADIOLOGY & ADDITIONAL TESTS:    Imaging Personally Reviewed:    Consultant(s) Notes Reviewed:      Care Discussed with Consultants/Other Providers: Toni Neumann, PGY-1  [phone]    Patient is a 44y old  Female who presents with a chief complaint of Headache (03 Dec 2019 07:02)      SUBJECTIVE/OVERNIGHT EVENTS: No acute events ON. Patient seen and examined at bedside in AM. MRI reviewed and spoke with Neuro-onc attending. He would like LP due to possible CSF dissemination.  Had lengthy discussion with patient regarding LP and the reasons behind why we would want to do an LP. Patient expressed/verbalized understanding of this and adamantly refused getting an LP. She said she had received another LP in the past and does not want to go through it again. She would like more time to think about it and go home and follow up as outpatient with Dr. hSi and Dr. Anguiano. Patient does not want to have any more tests. Patient continues to feel well, has an appetite, and no HA. Patient denies f/c/n/v/d/c.     MEDICATIONS  (STANDING):  dexAMETHasone     Tablet 4 milliGRAM(s) Oral every 6 hours  dextrose 5%. 1000 milliLiter(s) (50 mL/Hr) IV Continuous <Continuous>  dextrose 50% Injectable 12.5 Gram(s) IV Push once  dextrose 50% Injectable 25 Gram(s) IV Push once  dextrose 50% Injectable 25 Gram(s) IV Push once  influenza   Vaccine 0.5 milliLiter(s) IntraMuscular once  insulin lispro (HumaLOG) corrective regimen sliding scale   SubCutaneous three times a day before meals  insulin lispro (HumaLOG) corrective regimen sliding scale   SubCutaneous at bedtime  levETIRAcetam 500 milliGRAM(s) Oral two times a day  montelukast  Chewable 10 milliGRAM(s) Oral daily    MEDICATIONS  (PRN):  acetaminophen   Tablet .. 650 milliGRAM(s) Oral every 6 hours PRN Moderate Pain (4 - 6)  ALBUTerol    0.083% 2.5 milliGRAM(s) Nebulizer every 6 hours PRN Shortness of Breath and/or Wheezing  ALBUTerol    90 MICROgram(s) HFA Inhaler 1 Puff(s) Inhalation every 4 hours PRN Shortness of Breath and/or Wheezing  aluminum hydroxide/magnesium hydroxide/simethicone Suspension 30 milliLiter(s) Oral every 6 hours PRN Dyspepsia  dextrose 40% Gel 15 Gram(s) Oral once PRN Blood Glucose LESS THAN 70 milliGRAM(s)/deciliter  glucagon  Injectable 1 milliGRAM(s) IntraMuscular once PRN Glucose LESS THAN 70 milligrams/deciliter  oxyCODONE    IR 5 milliGRAM(s) Oral every 6 hours PRN Severe Pain (7 - 10)    CAPILLARY BLOOD GLUCOSE      POCT Blood Glucose.: 116 mg/dL (03 Dec 2019 21:47)  POCT Blood Glucose.: 112 mg/dL (03 Dec 2019 17:19)  POCT Blood Glucose.: 116 mg/dL (03 Dec 2019 12:33)  POCT Blood Glucose.: 119 mg/dL (03 Dec 2019 09:32)    I&O's Summary        Vital Signs Last 24 Hrs  T(C): 36.7 (04 Dec 2019 06:18), Max: 36.7 (04 Dec 2019 06:18)  T(F): 98 (04 Dec 2019 06:18), Max: 98 (04 Dec 2019 06:18)  HR: 89 (04 Dec 2019 06:18) (67 - 89)  BP: 112/74 (04 Dec 2019 06:18) (104/72 - 114/83)  BP(mean): --  RR: 16 (04 Dec 2019 06:18) (16 - 18)  SpO2: 98% (04 Dec 2019 06:18) (98% - 100%)    Physical Exam:   	Constitutional: NAD, eating food, ambulating  	Eyes: PERRLA, EOMI  	ENMT: NCAT  	Back: no ttp  	Respiratory: CTA b/l  	Cardiovascular: RRR, normal S1/S2, no m/r/g  	Gastrointestinal: soft, nt, nd  	Extremities: no leg edema  	Vascular: 2+ pulses b/l  	Neurological: A&Ox3, no weakness/numbness, sensation intact  Skin: no rashes    LABS                        11.6   7.04  )-----------( 274      ( 03 Dec 2019 05:25 )             35.7     12-03    138  |  106  |  8   ----------------------------<  115<H>  4.2   |  20<L>  |  0.76    Ca    9.6      03 Dec 2019 05:25  Phos  4.7     12-03  Mg     2.2     12-03                      RADIOLOGY & ADDITIONAL TESTS:    Imaging Personally Reviewed:    Consultant(s) Notes Reviewed:      Care Discussed with Consultants/Other Providers:

## 2019-12-04 NOTE — PROGRESS NOTE ADULT - ASSESSMENT
45 yo F w/ h/o asthma, GERD, metastatic lung ca with mets to brain on oral chemo p/w 2 days of HA unresponsive to excedrin with CT head showing areas of edema and MRI showing mixed response to therapy with some lesions becoming smaller and some larger. 43 yo F w/ h/o asthma, GERD, metastatic lung ca with mets to brain on oral chemo p/w 2 days of HA unresponsive to excedrin with CT head showing areas of edema and MRI showing mixed response to therapy with some lesions becoming smaller and some larger. Neuro-onc attending wanted LP, however patient now refusing and would like to go home and f/u as outpt.

## 2019-12-04 NOTE — PROGRESS NOTE ADULT - ASSESSMENT
Stage IV NSCLC with brain metastases. tagrisso on hold for now.  MRI notes mixed response.  LP has been recommended by neuro-onc but patient undecided at this time if she wants to do it.  She had a mild leucopenia prior and that is better and likely due to prior medication.

## 2019-12-04 NOTE — DISCHARGE NOTE PROVIDER - CARE PROVIDER_API CALL
Sincere Jernigan)  Hematology; Medical Oncology  [address] [facility], [address]  [place] [address]  Phone: [phone]  Fax: [phone]  Established Patient  Follow Up Time: 1 week    Enrique Brown)  Neurology  [address] [place] [address]  Phone: [phone]  Fax: [phone]  Established Patient  Follow Up Time: 1 week    Sakshi Bird)  Internal Medicine  [address] [place] [address]  Phone: [phone]  Fax: [phone]  Established Patient  Follow Up Time: 1 week

## 2019-12-04 NOTE — DISCHARGE NOTE PROVIDER - HOSPITAL COURSE
43 yo F w/ h/o asthma, GERD, metastatic lung ca with mets to brain on oral chemo p/w 2 days of HA unresponsive to excedrin with CT head showing areas of edema. Patient was seen by NSG that recommended steroids 4mg q6. Patient also received MRI that showed mixed response to therapy with some lesions becoming smaller and some becoming larger. Patient was seen by Neuro-oncologist Dr. Anguiano, who recommended an LP be performed to look for possible CSF dissemination. Spoke at length with patient regarding this, however, patient refusing to get LP. Patient verbalized understanding of why we wanted to do LP. Patient would like to follow up as outpatient to discuss further treatment options. Patient's symptoms improved dramatically and she did not have any more HA. Her appetite increased during this time period as well. Patient continued to feel well. Patient is safe, stable, and ready for discharge with close follow up with her oncologist Dr. Shi and Neuro-oncologist Dr. Anguiano. She has an appointment with Dr. Shi on 12/11/19 at 9:45 AM. 43 yo F w/ h/o asthma, GERD, metastatic lung ca with mets to brain on oral chemo p/w 2 days of HA unresponsive to excedrin with CT head showing areas of edema. Patient was seen by NSG that recommended steroids 4mg q6. Patient also received MRI that showed mixed response to therapy with some lesions becoming smaller and some becoming larger. Patient was seen by Neuro-oncologist Dr. Anguiano, who recommended an LP be performed to look for possible CSF dissemination. Spoke at length with patient regarding this, however, patient refusing to get LP. Patient verbalized understanding of why we wanted to do LP. Patient would like to follow up as outpatient to discuss further treatment options. Patient's symptoms improved dramatically and she did not have any more HA. Her appetite increased during this time period as well. Patient continued to feel well. Patient is safe, stable, and ready for discharge with close follow up with her oncologist Dr. Shi and Neuro-oncologist Dr. Anguiano. She has an appointment with Dr. Shi on 12/11/19 at 9:45 AM and will be discharged on a decadron taper and will resume chemo regimen on discharge.

## 2019-12-26 ENCOUNTER — INBOUND DOCUMENT (OUTPATIENT)
Age: 44
End: 2019-12-26

## 2021-09-03 ENCOUNTER — EMERGENCY (EMERGENCY)
Facility: HOSPITAL | Age: 46
LOS: 1 days | Discharge: ANOTHER HOSPITAL | End: 2021-09-03
Attending: STUDENT IN AN ORGANIZED HEALTH CARE EDUCATION/TRAINING PROGRAM | Admitting: STUDENT IN AN ORGANIZED HEALTH CARE EDUCATION/TRAINING PROGRAM
Payer: MEDICAID

## 2021-09-03 VITALS
HEART RATE: 79 BPM | DIASTOLIC BLOOD PRESSURE: 80 MMHG | HEIGHT: 64 IN | SYSTOLIC BLOOD PRESSURE: 120 MMHG | TEMPERATURE: 98 F | OXYGEN SATURATION: 100 % | RESPIRATION RATE: 16 BRPM

## 2021-09-03 DIAGNOSIS — C34.90 MALIGNANT NEOPLASM OF UNSPECIFIED PART OF UNSPECIFIED BRONCHUS OR LUNG: ICD-10-CM

## 2021-09-03 LAB
ALBUMIN SERPL ELPH-MCNC: 4.8 G/DL — SIGNIFICANT CHANGE UP (ref 3.3–5)
ALP SERPL-CCNC: 41 U/L — SIGNIFICANT CHANGE UP (ref 40–120)
ALT FLD-CCNC: 13 U/L — SIGNIFICANT CHANGE UP (ref 4–33)
ANION GAP SERPL CALC-SCNC: 14 MMOL/L — SIGNIFICANT CHANGE UP (ref 7–14)
APTT BLD: 32.8 SEC — SIGNIFICANT CHANGE UP (ref 27–36.3)
AST SERPL-CCNC: 17 U/L — SIGNIFICANT CHANGE UP (ref 4–32)
BASOPHILS # BLD AUTO: 0.01 K/UL — SIGNIFICANT CHANGE UP (ref 0–0.2)
BASOPHILS NFR BLD AUTO: 0.2 % — SIGNIFICANT CHANGE UP (ref 0–2)
BILIRUB SERPL-MCNC: 0.2 MG/DL — SIGNIFICANT CHANGE UP (ref 0.2–1.2)
BUN SERPL-MCNC: 9 MG/DL — SIGNIFICANT CHANGE UP (ref 7–23)
CALCIUM SERPL-MCNC: 9.8 MG/DL — SIGNIFICANT CHANGE UP (ref 8.4–10.5)
CHLORIDE SERPL-SCNC: 97 MMOL/L — LOW (ref 98–107)
CO2 SERPL-SCNC: 25 MMOL/L — SIGNIFICANT CHANGE UP (ref 22–31)
CREAT SERPL-MCNC: 0.58 MG/DL — SIGNIFICANT CHANGE UP (ref 0.5–1.3)
EOSINOPHIL # BLD AUTO: 0.01 K/UL — SIGNIFICANT CHANGE UP (ref 0–0.5)
EOSINOPHIL NFR BLD AUTO: 0.2 % — SIGNIFICANT CHANGE UP (ref 0–6)
GLUCOSE SERPL-MCNC: 105 MG/DL — HIGH (ref 70–99)
HCT VFR BLD CALC: 36.8 % — SIGNIFICANT CHANGE UP (ref 34.5–45)
HGB BLD-MCNC: 12.1 G/DL — SIGNIFICANT CHANGE UP (ref 11.5–15.5)
IANC: 4.11 K/UL — SIGNIFICANT CHANGE UP (ref 1.5–8.5)
IMM GRANULOCYTES NFR BLD AUTO: 0.4 % — SIGNIFICANT CHANGE UP (ref 0–1.5)
INR BLD: 1.22 RATIO — HIGH (ref 0.88–1.16)
LYMPHOCYTES # BLD AUTO: 0.8 K/UL — LOW (ref 1–3.3)
LYMPHOCYTES # BLD AUTO: 14.8 % — SIGNIFICANT CHANGE UP (ref 13–44)
MCHC RBC-ENTMCNC: 30.7 PG — SIGNIFICANT CHANGE UP (ref 27–34)
MCHC RBC-ENTMCNC: 32.9 GM/DL — SIGNIFICANT CHANGE UP (ref 32–36)
MCV RBC AUTO: 93.4 FL — SIGNIFICANT CHANGE UP (ref 80–100)
MONOCYTES # BLD AUTO: 0.45 K/UL — SIGNIFICANT CHANGE UP (ref 0–0.9)
MONOCYTES NFR BLD AUTO: 8.3 % — SIGNIFICANT CHANGE UP (ref 2–14)
NEUTROPHILS # BLD AUTO: 4.11 K/UL — SIGNIFICANT CHANGE UP (ref 1.8–7.4)
NEUTROPHILS NFR BLD AUTO: 76.1 % — SIGNIFICANT CHANGE UP (ref 43–77)
NRBC # BLD: 0 /100 WBCS — SIGNIFICANT CHANGE UP
NRBC # FLD: 0 K/UL — SIGNIFICANT CHANGE UP
PLATELET # BLD AUTO: 275 K/UL — SIGNIFICANT CHANGE UP (ref 150–400)
POTASSIUM SERPL-MCNC: 4.3 MMOL/L — SIGNIFICANT CHANGE UP (ref 3.5–5.3)
POTASSIUM SERPL-SCNC: 4.3 MMOL/L — SIGNIFICANT CHANGE UP (ref 3.5–5.3)
PROT SERPL-MCNC: 8.2 G/DL — SIGNIFICANT CHANGE UP (ref 6–8.3)
PROTHROM AB SERPL-ACNC: 13.9 SEC — HIGH (ref 10.6–13.6)
RBC # BLD: 3.94 M/UL — SIGNIFICANT CHANGE UP (ref 3.8–5.2)
RBC # FLD: 15.6 % — HIGH (ref 10.3–14.5)
SODIUM SERPL-SCNC: 136 MMOL/L — SIGNIFICANT CHANGE UP (ref 135–145)
WBC # BLD: 5.4 K/UL — SIGNIFICANT CHANGE UP (ref 3.8–10.5)
WBC # FLD AUTO: 5.4 K/UL — SIGNIFICANT CHANGE UP (ref 3.8–10.5)

## 2021-09-03 PROCEDURE — 99282 EMERGENCY DEPT VISIT SF MDM: CPT

## 2021-09-03 PROCEDURE — 70450 CT HEAD/BRAIN W/O DYE: CPT | Mod: 26

## 2021-09-03 PROCEDURE — 99285 EMERGENCY DEPT VISIT HI MDM: CPT

## 2021-09-03 RX ORDER — METOCLOPRAMIDE HCL 10 MG
10 TABLET ORAL ONCE
Refills: 0 | Status: DISCONTINUED | OUTPATIENT
Start: 2021-09-03 | End: 2021-09-03

## 2021-09-03 RX ORDER — DEXAMETHASONE 0.5 MG/5ML
10 ELIXIR ORAL ONCE
Refills: 0 | Status: COMPLETED | OUTPATIENT
Start: 2021-09-03 | End: 2021-09-03

## 2021-09-03 RX ORDER — ACETAMINOPHEN 500 MG
975 TABLET ORAL ONCE
Refills: 0 | Status: DISCONTINUED | OUTPATIENT
Start: 2021-09-03 | End: 2021-09-03

## 2021-09-03 RX ORDER — ACETAMINOPHEN 500 MG
975 TABLET ORAL ONCE
Refills: 0 | Status: COMPLETED | OUTPATIENT
Start: 2021-09-03 | End: 2021-09-03

## 2021-09-03 RX ORDER — VALPROIC ACID (AS SODIUM SALT) 250 MG/5ML
250 SOLUTION, ORAL ORAL ONCE
Refills: 0 | Status: DISCONTINUED | OUTPATIENT
Start: 2021-09-03 | End: 2021-09-07

## 2021-09-03 RX ADMIN — Medication 102 MILLIGRAM(S): at 23:05

## 2021-09-03 RX ADMIN — Medication 975 MILLIGRAM(S): at 20:36

## 2021-09-03 NOTE — ED PROVIDER NOTE - CLINICAL SUMMARY MEDICAL DECISION MAKING FREE TEXT BOX
Concern for vasogenic edema from brain metastases causing worsening intracranial hypertension, also considering intracranial bleed, consider infectious etiology of altered mental status although less likely, not likely IIH ISO brain mets, labs + trop to r/o acs w/ complaint of chest pain, will try to avoid sedation ISO possible neuro deficits.

## 2021-09-03 NOTE — ED PROVIDER NOTE - OBJECTIVE STATEMENT
46F pmh breast ca w/ mets to brain, hx of vasogenic edema presents to the ED for AMS, headache, chest pain 46F pmh breast ca w/ mets to brain, hx of vasogenic edema presents to the ED for AMS, headache, chest pain. Pt refuses to give any more information about what brought her to the ED, is paranoid and refuses to cooperate with exam or with questioning. 46F pmh breast ca w/ mets to brain, hx of vasogenic edema presents to the ED for AMS, headache, abd pain. Pt refuses to give any more information about what brought her to the ED, is paranoid and refuses to cooperate with exam or with questioning.    Tai Alcantara DO - Spoke to  - 47yo F hx breast ca w/ mets to brain s/p radiation/resection, on oral chemo, was seen at neurosurgeons office today for some pus that developed on her forehead and started on keflex. Took a dose tdoay with spicy food and had episode of vomiting and abd pain (now resolved). Pt also notes HA (family notes this is baseline headache that is unchanged. Pt denies any current n/v or abd pain and wants to go home. Pt at baseline mental status per family. 46F pmh breast ca w/ mets to brain, hx of vasogenic edema presents to the ED for AMS, headache, abd pain. Pt refuses to give any more information about what brought her to the ED, is paranoid and refuses to cooperate with exam or with questioning.    Tai Alcantara DO - Spoke to  - 47yo F hx breast ca w/ mets to brain s/p radiation/resection, on oral chemo, was seen at [facility] neurosurgeon Dr. Paul's office today for some pus that developed on her forehead and started on keflex. Took a dose tdoay with spicy food and had episode of vomiting and abd pain (now resolved). Pt also notes HA (family notes this is baseline headache that is unchanged. Pt denies any current n/v or abd pain and wants to go home. Pt at baseline mental status per family.

## 2021-09-03 NOTE — CONSULT NOTE ADULT - PROBLEM SELECTOR RECOMMENDATION 9
Decadron 10mg IV X 1  Transfer to [facility] to resume care by her attending neurosurgeon at the patient's request

## 2021-09-03 NOTE — CONSULT NOTE ADULT - SUBJECTIVE AND OBJECTIVE BOX
45 YO female with history of NSCLC with known brain metastases, S/P XRT and chemotherapy at [facility], seen here in 2019 but not treated, resumed care at [facility], S/P left craniotomy and resection of mass several months ago (Dr Parviz Paul), was seen in his office today for wound drainage. Patient was prescribed keflex and sent home. later today, patient had episodes af vomiting, patient's  brought her to ED. Patient C/O headache, otherwise no C/O.    WDWN female in NAD  Vital Signs Last 24 Hrs  T(C): 36.9 (03 Sep 2021 22:11), Max: 36.9 (03 Sep 2021 17:02)  T(F): 98.4 (03 Sep 2021 22:11), Max: 98.4 (03 Sep 2021 17:02)  HR: 78 (03 Sep 2021 22:11) (76 - 79)  BP: 105/80 (03 Sep 2021 22:11) (105/80 - 120/80)  BP(mean): --  RR: 18 (03 Sep 2021 22:11) (16 - 18)  SpO2: 100% (03 Sep 2021 22:11) (100% - 100%)    awake and alert  Minimally verbal, word finding difficulty  PERRLA, EOMI  APPIAH with good strength    < from: CT Head No Cont (09.03.21 @ 21:15) >    Patient is status post left pterional craniotomy.    Significant vasogenic edema involving the left frontal lobe, with edema extending to the level of the genu the corpus callosum and into the right frontal lobe.. Couple foci of hyperdensity within the left inferior frontal lobe may represent mineralization/calcification, which may be posttreatment in nature. Additional scattered bilateral calcifications are identified. 1.3 x 0.7 cm right periventricular ovoid hyperdense lesion, likely containing calcification and previously corresponding to a metastatic focus identified on brain MRI of 8/27/2020. Nonspecific focal hypodensity within the right cerebellum.    Mass effect contributes to rightward midline shift of 9 mm and compression of the left lateral ventricle. No alejandra hydrocephalus.    Paranasal sinuses and left mastoid air cells are clear. Partial opacification of the right mastoid air cells, nonspecific. Intraorbital contents are unremarkable.    IMPRESSION:    Extensive vasogenic edema centered on the left frontal lobe and extending along the general the corpus callosum into the right frontal lobe. Foci of hyperdensity within the area of vasogenic edema may represent posttreatment calcification/mineralization, versus less likely foci of acute hemorrhage.    Additional 1.3 x 0.7 cm ovoid hyperdense right periventricular focus, likely representing a treated metastatic focus which was present on prior brain MRI of 8/27/2020. Superimposed hemorrhage is not excluded.    9 mm of rightward midline shift without alejandra hydrocephalus.    More sensitive evaluation with contrast-enhanced brain MRI is recommended, if no contraindications exist.    < end of copied text >

## 2021-09-03 NOTE — CONSULT NOTE ADULT - ASSESSMENT
45 YO female S/P left craniotomy for metastatic brain mass presenting with vomiting found to have left frontal edema, no recent prior imaging here for comparison. Patient and her  are requesting transfer to [facility] where she is already under the care of Dr Paul.

## 2021-09-03 NOTE — ED PROVIDER NOTE - NS ED ATTENDING STATEMENT MOD
Patient:   Johnnye Buerger            MRN: [id number]            FIN: [id number]              Age:   78 years     Sex:  FEMALE     :  27   Associated Diagnoses:   None   Author:   Sindy Blanchard     Basic Information   History source: Patient, RN. History of Present Illness   DATE OF ADMISSION: 19  DATE OF DISCHARGE: 10/04/19    PCP: Dr. Fransisco Pride PCP NOTIFIED: 10/04/19  METHOD OF NOTIFICATION: Kumar    CONSULTING PHYSICIAN(s):  Dr. Carey Randall:  Acute cholecystitis with cholelithiasis s/p cholecystotomy tube placement (2019 by IR Dr. Anahi Vance)  Altered mental status and toxic metabolic encephalopathy with acute delirium 2/2 medications vs UTI  Escherichia coli UTI  Sepsis, present on arrival, d/t above and cholecystitis  Chronic respiratory failure requiring supplemental oxygen   Thrombocytosis, etiology unknown  Acute constipation  Chronic sinus tachycardia  Chronic normocytic anemia   Chronic severe protein calorie malnutrition  Hypokalemia   Acute kidney injury 2/2 dehydration  Generalized weakness and deconditioning   Dementia   Anxiety/depression   Glaucoma     PROCEDURES PERFORMED DURING ADMISSION AND DATES PERFORMED:  Cholecystotomy tube placement (2019 by IR Dr. Anahi Vance)       TEST RESULTS PENDING AT DISCHARGE AND PROBLEMS NEEDING F/U:  None    HOSPITAL COURSE:  This is a 78year old female,  a patient of Dr. Massey Prior, with a past medical history of chronic respiratory failure, chronic sinus tachycardia, chronic normocytic anemia, dementia, anxiety and depression, who presented to the ED with a chief complaint of  abdominal pain onset the morning prior to arrival. Upon arrival to the ED, the patient was afebrile with a ,  RR 17,  SpO2 95%, /69.  A CT of the abdomen and pelvis with  contrast  was remarkable for  moderate dilation of the gallbladder lumen with mild pericholecystic induration/fluid ascending into the posterior right subhepatic space and central mesentery (peripancreatic). Labs were remarkale for creatinine 1.36, alk phosphatase 161, WBC 14.4, hemoglobin 10.9,  and a urinalysis was positive for small occult blood, moderate leukocyte esterase and moderate bacteria. In the ED, the patient was given IV fluids,  GI and General Surgery were consulted and the patient was then admitted for further workup and management. During admission, Dr. Callie Stratton was consulted to evaluate the patient and recommended a surgical consult in relation to the possible cholecystitis. It was also noted that given the absence of  choledocholithiasis, there was no indication for ERCP at the time. A bowel regimen was also initiated with MiraLax. Dr. Rosemarie Santiago was also consulted and noted that the workup at that point had demonstrated urinary tract infection and  cholelithiasis/cholecystitis and given the patient's significant medical comorbidities, the started the patient on IV antibiotics and limit to clear liquid diet in hope that the acute gallbladder process would resolve conservatively. It was then noted that if not, the patient would be a good candidate for a cholecystostomy tube. A HIDA scan was then obtained for further evaluation and noted persistent gallbladder nonvisualization following  morphine administration, compatible with cystic duct obstruction / acute cholecystitis. A cholecystostomy tube placement was then recommended and on 9/25 the patient underwent this procedure. The patient was provided antibiotic coverage with IV Rocephin and IV Flagyl and her drainage cultures were monitored closely and came back negative and final on 9/30. Her diet was advanced as per General surgery and she was given supportive care with IV  fluids and antiemetics. The patient's clinical status was monitored closely and she was followed by General Surgery throughout admission.  The patient was instructed to follow-up in the office in three weeks as outpatient. Due to the patient's abnormal urinalysis, a urine culture was positive for E.coli and final on 9/26. The patient was given antibiotic coverage with Rocephin and her urine output was monitored closely. In relation to the patient's altered mental status with acute delirium, the patient's status improved after the discontinuation of morphine and olanzepine. Her mentation was monitored closely. Physical therapy worked with the patient as tolerated and upon discharge it was recommended that the patient participate in daily skilled PT. All other chronic isses were addressedand managed with the patient's home regimen and did not cause any complications during the patient's admission. The patient's condition improved across their admission and they were medically cleared for discharge. Prior to this, all results and medication changes, as well as the discharge plan was discussed with the patient, and the patient voiced understanding and agreement with the discharge plan.        CONDITION ON DISCHARGE: Stable   CODE STATUS: FULL Code    DISCHARGE INSTRUCTIONS:  DISCHARGE TO: [place] - pending; if not will go home with [facility]  DIET: General + Ensure   ACTIVITY: As tolerated   [address] RN/PT/OT/MSW/Wound/Ostomy Agency: As indicated  HOME OXYGEN: 1L via NC    FOLLOW-UP APPOINTMENTS:   IR 1 week for tube check   Sx 3 weeks   PCP 1 week    DISCHARGE MEDICATION LIST   Allergies: No Known Medication Allergies     MEDICATION  DOSE  ROUTE  FREQUENCY  SPECIAL INSTRUCTIONS   acetaminophen (acetaminophen oral 325 mg tablet (Tylenol))  325 mg=1 tab  Oral  Every 6 hours As Needed: pain    amoxicillin-clavulanate (Augmentin oral 875-125 mg tablet)  1 tab  Oral  Every 12 hoursFor 5 days    bisacodyl (Dulcolax Laxative (bisacodyl) oral 5 mg DR tablet)  10 mg=2 tab  Oral  Daily As Needed: constipation    brimonidine ophthalmic (brimonidine ophthalmic 0.2% solution (Alphagan))  1 drop  Left Eye  Twice daily    digoxin (digoxin oral 125 mcg (0.125 mg) tablet)  125 mcg=1 tab  Oral  Every Monday, Wednesday, and Friday    donepezil (donepezil oral 5 mg tablet)  5 mg=1 tab  Oral  Nightly at bedtime    escitalopram (Lexapro oral 10 mg tablet)  10 mg=1 tab  Oral  Daily    latanoprost ophthalmic (Lumigan ophthalmic 0.01% [auto-sub to latanoprost ophthalmic])  1 drop  Left Eye  Nightly at bedtime    modafinil (modafinil oral 100 mg tablet)  100 mg=1 tab  Oral  Every morning    OLANZapine (OLANZapine oral 5 mg tablet)  0.5 mg tab  Oral  Every evening    polyethylene glycol 3350 (MiraLax oral powder for solution)    Oral  Daily As Needed: constipation         Charting performed by barbie Wagner for Dr. Donal Shanks: > 31 mins. All medical record entries made by the scribe were at my direction. I have reviewed the chart and agree that the record accurately reflects my personal performance of the history, physical exam, hospital course, and assessment and plan. .       Physical Examination               Vital signs   Vital Signs   10/04/19 07:12 CDT Heart/Pulse Rate 82 beats/min  Normal    Respiration Rate 16 breaths/min  Normal    SpO2 95 %  Normal   10/04/19 07:11 CDT Temperature - VS 36.7 deg_C  Normal    NIBP Systolic 867 mm Hg  Normal    NIBP Diastolic 78 mm Hg  Normal    NIBP Source Right Arm    NIBP MAP Manual Entry 94   . General:  No acute distress. Skin:  Warm. Ears, nose, mouth and throat:  Oral mucosa moist.   Cardiovascular:  Regular rate and rhythm. Respiratory:  Lungs are clear to auscultation, respirations are non-labored. Gastrointestinal:  Soft, No abd TTP, cholecystotomy tube . Musculoskeletal:  No swelling. Neurological:  Alert and oriented to person, place, time, and situation, Alert. Eye  Pupils are equal, round and reactive to light.      Medical Decision Making   Results review:    Labs between: 03-OCT-2019 11:39 to 04-OCT-2019 11:39    CBC:                 WBC  HgB  Hct  Plt  MCV  RDW   04-OCT-2019 (H) 11.8  (L) 9.9  (L) 32.7  (H) 637  95.1  (H) 15.6     DIFF:                 Seg  Neutroph//ABS  Lymph//ABS  Mono//ABS  EOS/ABS  [id number] NOT APPLICABLE  63 // 7.4 25 // 3.0 9 // (H) 1.1  2 // 0.2    BMP:                 Na  Cl  BUN  Glu   04-OCT-2019 140  101  8  88                              K  CO2  Cr  Ca                              3.9  (H) 40  0.57  8.6                                  . Attending with

## 2021-09-03 NOTE — ED PROVIDER NOTE - NSICDXPASTMEDICALHX_GEN_ALL_CORE_FT
PAST MEDICAL HISTORY:  Asthma, unspecified asthma severity, unspecified whether complicated, unspecified whether persistent     Gastroesophageal reflux disease, esophagitis presence not specified     Malignant neoplasm of lung, unspecified laterality, unspecified part of lung     No pertinent past medical history

## 2021-09-03 NOTE — ED ADULT NURSE NOTE - NS_NURSE_OTHER_FACILTIY_ED_ALL_ED_FT
COVID-19 Screening:    • Does the patient OR patient’s household members have any of the following symptoms?  o Temperature: Fever ?100.0°F or ?37.8°C?  No    o Respiratory symptoms: New or worsening cough, shortness of breath, difficulty breathing, or sore throat? No    o GI symptoms: New onset of nausea, vomiting or diarrhea?  No    o Miscellaneous: New onset of loss of taste or smell, chills, repeated shaking with chills, muscle pain, headache, congestion or runny nose?  No      Has the patient or a household member been tested for COVID-19 in the past 14 days?  No  (if yes, include result)    Have you had known exposure to COVID-19 (contact with someone with known/suspected COVID-19?) in the past 14 days? No      Patient presents to the urgent care with a complaint of bug bite to the left lower leg. Onset this past weekend.                   [facility]

## 2021-09-03 NOTE — ED ADULT NURSE NOTE - NSIMPLEMENTINTERV_GEN_ALL_ED
Implemented All Fall Risk Interventions:  [place] to call system. Call bell, personal items and telephone within reach. Instruct patient to call for assistance. Room bathroom lighting operational. Non-slip footwear when patient is off stretcher. Physically safe environment: no spills, clutter or unnecessary equipment. Stretcher in lowest position, wheels locked, appropriate side rails in place. Provide visual cue, wrist band, yellow gown, etc. Monitor gait and stability. Monitor for mental status changes and reorient to person, place, and time. Review medications for side effects contributing to fall risk. Reinforce activity limits and safety measures with patient and family.

## 2021-09-03 NOTE — ED ADULT NURSE NOTE - OBJECTIVE STATEMENT
Received pt in room 21, pt appears confused, A&Ox2 to self and location, respirations even and unlabored b/l. Pt c/o headache, n/v since yesterday. Denies nausea at this time. Pt keeps stating "why did I have to come here?" Sinus rhythm on cardiac monitor. PMHx lung ca with brain mets, asthma, GERD. Awaiting MD kent. Will continue to monitor.

## 2021-09-03 NOTE — ED ADULT NURSE REASSESSMENT NOTE - NS ED NURSE REASSESS COMMENT FT1
Pt refusing IV, refusing blood work, refusing EKG, refusing CT. MD Su aware. Awaiting further orders. Will continue to monitor.

## 2021-09-03 NOTE — ED PROVIDER NOTE - ATTENDING CONTRIBUTION TO CARE
I have personally performed a face to face medical and diagnostic evaluation of the patient. I have discussed with and reviewed the Resident's note and agree with the History, ROS, Physical Exam and MDM unless otherwise indicated. A brief summary of my personal evaluation and impression can be found below.    47yo F hx breast ca w/ mets to brain s/p radiation/resection, on oral chemo, was seen at neurosurgeons office today for some pus that developed on her forehead and started on keflex. Took a dose tdoay with spicy food and had episode of vomiting and abd pain (now resolved). Pt also notes HA (family notes this is baseline headache that is unchanged. Pt denies any current n/v or abd pain and wants to go home. Pt at baseline mental status per family.  VITALS: Initial triage and subsequent vitals have been reviewed by me.  Gen: NAD, nontoxic, AAOx1, paranoid disposition  Head: NCAT  HEENT: MMM, normal conjunctiva, anicteric, neck supple  Lung: CTAB, no adventitious sounds  CV: RRR, no murmurs, 2+symmetric peripheral pulses  Abd: soft, NTND, no rebound or guarding, no palpable masses  MSK: No edema, no visible deformities  Neuro: Moving all extremity grossly, following commands appropriately, fluid speech  Skin: Warm and dry, no evidence of rash  Hx brain mets w/ HA n/v concern of mass effect or other intracranial pathology. Abd soft nonttp do not suspect surgical process. Labs, CTH and reassess

## 2021-09-03 NOTE — ED ADULT TRIAGE NOTE - CHIEF COMPLAINT QUOTE
PT states that she has been having a headache for the last week, states that it is worse today, pt actively vomiting in triage. Although pt answering questions appropriately, pt appears altered PMH: Lung cancer with brain mets

## 2021-09-03 NOTE — ED PROVIDER NOTE - PROGRESS NOTE DETAILS
Jerry Su, DO PGY-2: Pt refusing all labs, ekg, imaging, acting paranoid, will call  to redirect. Tai Alcantara DO -  at bedside had long discussion (see my note in history). Pt refusing any blood work or CT and wants to go home, HA and nausea resolved.  states this is her usual self and it was the episode of vomiting that concerned him but when she had vasogenic edema in the past her presentation was much worse. Explained that for workup in ED pt would need to be sedated in order to cooperate which comes with risks given her baseline altered mental status, however pt agreeable to just nonconCTH. Explained that this could be an incomplete evaluation for her presentation and diagnoses could be missed and would need strict return precautions and fu. Family understands and opt for no sedation for labs/conCT and would prefer to just have nonconCT w/ return precautions and fu. awaiting transfer center call back to transfer pt to [facility] for eval by her nsx Dr. Paul

## 2021-09-03 NOTE — ED PROVIDER NOTE - PHYSICAL EXAMINATION
Jerry Su, DO PGY-2:  CONSTITUTIONAL: Pt appears confused  SKIN: sugical scars over scalp  HEAD: NCAT  EYES: EOMI, PERRLA, no scleral icterus, conjunctiva pink  ENT: normal pharynx with no erythema or exudates  CARD: RRR, no murmurs.  RESP: clear to ausculation b/l. No crackles or wheezing.  ABD: soft, non-tender, non-distended, no rebound or guarding.  MSK: Pt not cooperating w/ msk exam, moving extremities freely, strength grossly in tact  NEURO: Non-cooperative with exam  PSYCH: Non-Cooperative, confused, paranoid Jerry Su, DO PGY-2:  CONSTITUTIONAL: Pt appears confused  SKIN: sugical scars over scalp well appearing not acutely infected  HEAD: NCAT  EYES: EOMI, PERRLA, no scleral icterus, conjunctiva pink  ENT: normal pharynx with no erythema or exudates  CARD: RRR, no murmurs.  RESP: clear to ausculation b/l. No crackles or wheezing.  ABD: soft, non-tender, non-distended, no rebound or guarding.  MSK: Pt not cooperating w/ msk exam, moving extremities freely, strength grossly in tact  NEURO: Non-cooperative with exam  PSYCH: Non-Cooperative, confused, paranoid

## 2021-09-04 VITALS
DIASTOLIC BLOOD PRESSURE: 72 MMHG | OXYGEN SATURATION: 99 % | RESPIRATION RATE: 16 BRPM | HEART RATE: 60 BPM | SYSTOLIC BLOOD PRESSURE: 104 MMHG | TEMPERATURE: 98 F

## 2021-09-04 PROBLEM — K21.9 GASTRO-ESOPHAGEAL REFLUX DISEASE WITHOUT ESOPHAGITIS: Chronic | Status: ACTIVE | Noted: 2019-12-01

## 2021-09-04 PROBLEM — J45.909 UNSPECIFIED ASTHMA, UNCOMPLICATED: Chronic | Status: ACTIVE | Noted: 2019-12-01

## 2021-09-04 PROBLEM — C34.90 MALIGNANT NEOPLASM OF UNSPECIFIED PART OF UNSPECIFIED BRONCHUS OR LUNG: Chronic | Status: ACTIVE | Noted: 2019-12-01

## 2021-09-04 LAB
BLD GP AB SCN SERPL QL: NEGATIVE — SIGNIFICANT CHANGE UP
RH IG SCN BLD-IMP: POSITIVE — SIGNIFICANT CHANGE UP
SARS-COV-2 RNA SPEC QL NAA+PROBE: SIGNIFICANT CHANGE UP

## 2022-02-02 ENCOUNTER — INPATIENT (INPATIENT)
Facility: HOSPITAL | Age: 47
LOS: 25 days | Discharge: HOME HEALTH | End: 2022-02-28
Attending: STUDENT IN AN ORGANIZED HEALTH CARE EDUCATION/TRAINING PROGRAM | Admitting: STUDENT IN AN ORGANIZED HEALTH CARE EDUCATION/TRAINING PROGRAM
Payer: MEDICAID

## 2022-02-02 VITALS
RESPIRATION RATE: 19 BRPM | OXYGEN SATURATION: 100 % | SYSTOLIC BLOOD PRESSURE: 105 MMHG | HEART RATE: 127 BPM | HEIGHT: 64 IN | WEIGHT: 143.08 LBS | TEMPERATURE: 99 F | DIASTOLIC BLOOD PRESSURE: 73 MMHG

## 2022-02-02 DIAGNOSIS — Z98.890 OTHER SPECIFIED POSTPROCEDURAL STATES: Chronic | ICD-10-CM

## 2022-02-02 DIAGNOSIS — J45.20 MILD INTERMITTENT ASTHMA, UNCOMPLICATED: ICD-10-CM

## 2022-02-02 DIAGNOSIS — D72.829 ELEVATED WHITE BLOOD CELL COUNT, UNSPECIFIED: ICD-10-CM

## 2022-02-02 DIAGNOSIS — C34.90 MALIGNANT NEOPLASM OF UNSPECIFIED PART OF UNSPECIFIED BRONCHUS OR LUNG: ICD-10-CM

## 2022-02-02 DIAGNOSIS — G93.89 OTHER SPECIFIED DISORDERS OF BRAIN: Chronic | ICD-10-CM

## 2022-02-02 DIAGNOSIS — R79.89 OTHER SPECIFIED ABNORMAL FINDINGS OF BLOOD CHEMISTRY: ICD-10-CM

## 2022-02-02 DIAGNOSIS — R09.89 OTHER SPECIFIED SYMPTOMS AND SIGNS INVOLVING THE CIRCULATORY AND RESPIRATORY SYSTEMS: ICD-10-CM

## 2022-02-02 LAB
ALBUMIN SERPL ELPH-MCNC: 3.6 G/DL — SIGNIFICANT CHANGE UP (ref 3.3–5)
ALP SERPL-CCNC: 333 U/L — HIGH (ref 40–120)
ALT FLD-CCNC: 1231 U/L — HIGH (ref 12–78)
ANION GAP SERPL CALC-SCNC: 9 MMOL/L — SIGNIFICANT CHANGE UP (ref 5–17)
APPEARANCE UR: ABNORMAL
APTT BLD: 32.3 SEC — SIGNIFICANT CHANGE UP (ref 27.5–35.5)
AST SERPL-CCNC: 1207 U/L — HIGH (ref 15–37)
BACTERIA # UR AUTO: ABNORMAL
BASOPHILS # BLD AUTO: 0 K/UL — SIGNIFICANT CHANGE UP (ref 0–0.2)
BASOPHILS NFR BLD AUTO: 0 % — SIGNIFICANT CHANGE UP (ref 0–2)
BILIRUB SERPL-MCNC: 2.4 MG/DL — HIGH (ref 0.2–1.2)
BILIRUB UR-MCNC: ABNORMAL
BUN SERPL-MCNC: 13 MG/DL — SIGNIFICANT CHANGE UP (ref 7–23)
CALCIUM SERPL-MCNC: 10 MG/DL — SIGNIFICANT CHANGE UP (ref 8.5–10.1)
CHLORIDE SERPL-SCNC: 103 MMOL/L — SIGNIFICANT CHANGE UP (ref 96–108)
CK SERPL-CCNC: 78 U/L — SIGNIFICANT CHANGE UP (ref 26–192)
CO2 SERPL-SCNC: 26 MMOL/L — SIGNIFICANT CHANGE UP (ref 22–31)
COLOR SPEC: YELLOW — SIGNIFICANT CHANGE UP
CREAT SERPL-MCNC: 1.17 MG/DL — SIGNIFICANT CHANGE UP (ref 0.5–1.3)
DIFF PNL FLD: ABNORMAL
EOSINOPHIL # BLD AUTO: 0 K/UL — SIGNIFICANT CHANGE UP (ref 0–0.5)
EOSINOPHIL NFR BLD AUTO: 0 % — SIGNIFICANT CHANGE UP (ref 0–6)
FLUAV AG NPH QL: SIGNIFICANT CHANGE UP
FLUBV AG NPH QL: SIGNIFICANT CHANGE UP
GIANT PLATELETS BLD QL SMEAR: PRESENT — SIGNIFICANT CHANGE UP
GLUCOSE BLDC GLUCOMTR-MCNC: 104 MG/DL — HIGH (ref 70–99)
GLUCOSE BLDC GLUCOMTR-MCNC: 124 MG/DL — HIGH (ref 70–99)
GLUCOSE SERPL-MCNC: 130 MG/DL — HIGH (ref 70–99)
GLUCOSE UR QL: NEGATIVE MG/DL — SIGNIFICANT CHANGE UP
GRAN CASTS # UR COMP ASSIST: ABNORMAL /LPF
HAV IGM SER-ACNC: SIGNIFICANT CHANGE UP
HBV CORE IGM SER-ACNC: SIGNIFICANT CHANGE UP
HBV SURFACE AG SER-ACNC: SIGNIFICANT CHANGE UP
HCG SERPL-ACNC: 1 MIU/ML — SIGNIFICANT CHANGE UP
HCT VFR BLD CALC: 36.2 % — SIGNIFICANT CHANGE UP (ref 34.5–45)
HCV AB S/CO SERPL IA: 0.08 S/CO — SIGNIFICANT CHANGE UP (ref 0–0.99)
HCV AB SERPL-IMP: SIGNIFICANT CHANGE UP
HGB BLD-MCNC: 12.1 G/DL — SIGNIFICANT CHANGE UP (ref 11.5–15.5)
HYPOSEGMENTATION: PRESENT — SIGNIFICANT CHANGE UP
INR BLD: 1.46 RATIO — HIGH (ref 0.88–1.16)
KETONES UR-MCNC: NEGATIVE — SIGNIFICANT CHANGE UP
LEUKOCYTE ESTERASE UR-ACNC: ABNORMAL
LIDOCAIN IGE QN: 56 U/L — LOW (ref 73–393)
LYMPHOCYTES # BLD AUTO: 0 % — LOW (ref 13–44)
LYMPHOCYTES # BLD AUTO: 0 K/UL — LOW (ref 1–3.3)
MANUAL SMEAR VERIFICATION: SIGNIFICANT CHANGE UP
MCHC RBC-ENTMCNC: 30 PG — SIGNIFICANT CHANGE UP (ref 27–34)
MCHC RBC-ENTMCNC: 33.4 G/DL — SIGNIFICANT CHANGE UP (ref 32–36)
MCV RBC AUTO: 89.8 FL — SIGNIFICANT CHANGE UP (ref 80–100)
METAMYELOCYTES # FLD: 1 % — HIGH (ref 0–0)
MONOCYTES # BLD AUTO: 0.4 K/UL — SIGNIFICANT CHANGE UP (ref 0–0.9)
MONOCYTES NFR BLD AUTO: 3 % — SIGNIFICANT CHANGE UP (ref 2–14)
NEUTROPHILS # BLD AUTO: 12.83 K/UL — HIGH (ref 1.8–7.4)
NEUTROPHILS NFR BLD AUTO: 76 % — SIGNIFICANT CHANGE UP (ref 43–77)
NEUTS BAND # BLD: 20 % — HIGH (ref 0–8)
NEUTS VAC BLD QL SMEAR: SLIGHT — SIGNIFICANT CHANGE UP
NITRITE UR-MCNC: NEGATIVE — SIGNIFICANT CHANGE UP
NRBC # BLD: 0 /100 — SIGNIFICANT CHANGE UP (ref 0–0)
NRBC # BLD: SIGNIFICANT CHANGE UP /100 WBCS (ref 0–0)
OVALOCYTES BLD QL SMEAR: SLIGHT — SIGNIFICANT CHANGE UP
PH UR: 8 — SIGNIFICANT CHANGE UP (ref 5–8)
PLAT MORPH BLD: ABNORMAL
PLATELET # BLD AUTO: 290 K/UL — SIGNIFICANT CHANGE UP (ref 150–400)
POTASSIUM SERPL-MCNC: 3.5 MMOL/L — SIGNIFICANT CHANGE UP (ref 3.5–5.3)
POTASSIUM SERPL-SCNC: 3.5 MMOL/L — SIGNIFICANT CHANGE UP (ref 3.5–5.3)
PROT SERPL-MCNC: 8.2 GM/DL — SIGNIFICANT CHANGE UP (ref 6–8.3)
PROT UR-MCNC: 30 MG/DL
PROTHROM AB SERPL-ACNC: 16.6 SEC — HIGH (ref 10.6–13.6)
RBC # BLD: 4.03 M/UL — SIGNIFICANT CHANGE UP (ref 3.8–5.2)
RBC # FLD: 14.8 % — HIGH (ref 10.3–14.5)
RBC BLD AUTO: ABNORMAL
RBC CASTS # UR COMP ASSIST: SIGNIFICANT CHANGE UP /HPF (ref 0–4)
SARS-COV-2 RNA SPEC QL NAA+PROBE: SIGNIFICANT CHANGE UP
SODIUM SERPL-SCNC: 138 MMOL/L — SIGNIFICANT CHANGE UP (ref 135–145)
SP GR SPEC: 1 — LOW (ref 1.01–1.02)
TROPONIN I, HIGH SENSITIVITY RESULT: <3 NG/L — SIGNIFICANT CHANGE UP
UROBILINOGEN FLD QL: 4 MG/DL
WBC # BLD: 13.36 K/UL — HIGH (ref 3.8–10.5)
WBC # FLD AUTO: 13.36 K/UL — HIGH (ref 3.8–10.5)
WBC UR QL: SIGNIFICANT CHANGE UP

## 2022-02-02 PROCEDURE — 70496 CT ANGIOGRAPHY HEAD: CPT | Mod: 26,MA

## 2022-02-02 PROCEDURE — 70450 CT HEAD/BRAIN W/O DYE: CPT | Mod: 26,MA,59

## 2022-02-02 PROCEDURE — 70498 CT ANGIOGRAPHY NECK: CPT | Mod: 26,MA

## 2022-02-02 PROCEDURE — 99223 1ST HOSP IP/OBS HIGH 75: CPT

## 2022-02-02 PROCEDURE — 0042T: CPT

## 2022-02-02 PROCEDURE — 74177 CT ABD & PELVIS W/CONTRAST: CPT | Mod: 26

## 2022-02-02 PROCEDURE — 95816 EEG AWAKE AND DROWSY: CPT | Mod: 26

## 2022-02-02 PROCEDURE — 70553 MRI BRAIN STEM W/O & W/DYE: CPT | Mod: 26

## 2022-02-02 PROCEDURE — 93010 ELECTROCARDIOGRAM REPORT: CPT

## 2022-02-02 PROCEDURE — 99291 CRITICAL CARE FIRST HOUR: CPT

## 2022-02-02 RX ORDER — MONTELUKAST 4 MG/1
4 TABLET, CHEWABLE ORAL DAILY
Refills: 0 | Status: DISCONTINUED | OUTPATIENT
Start: 2022-02-02 | End: 2022-02-02

## 2022-02-02 RX ORDER — HEPARIN SODIUM 5000 [USP'U]/ML
5000 INJECTION INTRAVENOUS; SUBCUTANEOUS EVERY 8 HOURS
Refills: 0 | Status: DISCONTINUED | OUTPATIENT
Start: 2022-02-02 | End: 2022-02-06

## 2022-02-02 RX ORDER — ASPIRIN/CALCIUM CARB/MAGNESIUM 324 MG
81 TABLET ORAL DAILY
Refills: 0 | Status: DISCONTINUED | OUTPATIENT
Start: 2022-02-02 | End: 2022-02-06

## 2022-02-02 RX ORDER — LEVETIRACETAM 250 MG/1
1 TABLET, FILM COATED ORAL
Qty: 0 | Refills: 0 | DISCHARGE

## 2022-02-02 RX ORDER — DEXAMETHASONE 0.5 MG/5ML
10 ELIXIR ORAL ONCE
Refills: 0 | Status: COMPLETED | OUTPATIENT
Start: 2022-02-02 | End: 2022-02-02

## 2022-02-02 RX ORDER — LANOLIN ALCOHOL/MO/W.PET/CERES
3 CREAM (GRAM) TOPICAL AT BEDTIME
Refills: 0 | Status: DISCONTINUED | OUTPATIENT
Start: 2022-02-02 | End: 2022-02-28

## 2022-02-02 RX ORDER — DEXTROSE 50 % IN WATER 50 %
15 SYRINGE (ML) INTRAVENOUS ONCE
Refills: 0 | Status: DISCONTINUED | OUTPATIENT
Start: 2022-02-02 | End: 2022-02-28

## 2022-02-02 RX ORDER — SODIUM CHLORIDE 9 MG/ML
500 INJECTION INTRAMUSCULAR; INTRAVENOUS; SUBCUTANEOUS ONCE
Refills: 0 | Status: COMPLETED | OUTPATIENT
Start: 2022-02-02 | End: 2022-02-02

## 2022-02-02 RX ORDER — DEXTROSE 50 % IN WATER 50 %
25 SYRINGE (ML) INTRAVENOUS ONCE
Refills: 0 | Status: DISCONTINUED | OUTPATIENT
Start: 2022-02-02 | End: 2022-02-28

## 2022-02-02 RX ORDER — PROCHLORPERAZINE MALEATE 5 MG
10 TABLET ORAL EVERY 6 HOURS
Refills: 0 | Status: DISCONTINUED | OUTPATIENT
Start: 2022-02-02 | End: 2022-02-28

## 2022-02-02 RX ORDER — MONTELUKAST 4 MG/1
5 TABLET, CHEWABLE ORAL DAILY
Refills: 0 | Status: DISCONTINUED | OUTPATIENT
Start: 2022-02-02 | End: 2022-02-28

## 2022-02-02 RX ORDER — VALPROIC ACID (AS SODIUM SALT) 250 MG/5ML
250 SOLUTION, ORAL ORAL
Refills: 0 | Status: DISCONTINUED | OUTPATIENT
Start: 2022-02-02 | End: 2022-02-14

## 2022-02-02 RX ORDER — OSIMERTINIB 80 1/1
1 TABLET, FILM COATED ORAL
Qty: 0 | Refills: 0 | DISCHARGE

## 2022-02-02 RX ORDER — DEXAMETHASONE 0.5 MG/5ML
4 ELIXIR ORAL
Refills: 0 | Status: DISCONTINUED | OUTPATIENT
Start: 2022-02-03 | End: 2022-02-28

## 2022-02-02 RX ORDER — DEXTROSE 50 % IN WATER 50 %
12.5 SYRINGE (ML) INTRAVENOUS ONCE
Refills: 0 | Status: DISCONTINUED | OUTPATIENT
Start: 2022-02-02 | End: 2022-02-28

## 2022-02-02 RX ORDER — SODIUM CHLORIDE 9 MG/ML
1000 INJECTION INTRAMUSCULAR; INTRAVENOUS; SUBCUTANEOUS
Refills: 0 | Status: DISCONTINUED | OUTPATIENT
Start: 2022-02-02 | End: 2022-02-04

## 2022-02-02 RX ORDER — OSIMERTINIB 80 1/1
80 TABLET, FILM COATED ORAL DAILY
Refills: 0 | Status: DISCONTINUED | OUTPATIENT
Start: 2022-02-02 | End: 2022-02-03

## 2022-02-02 RX ORDER — GLUCAGON INJECTION, SOLUTION 0.5 MG/.1ML
1 INJECTION, SOLUTION SUBCUTANEOUS ONCE
Refills: 0 | Status: DISCONTINUED | OUTPATIENT
Start: 2022-02-02 | End: 2022-02-28

## 2022-02-02 RX ADMIN — SODIUM CHLORIDE 125 MILLILITER(S): 9 INJECTION INTRAMUSCULAR; INTRAVENOUS; SUBCUTANEOUS at 14:26

## 2022-02-02 RX ADMIN — HEPARIN SODIUM 5000 UNIT(S): 5000 INJECTION INTRAVENOUS; SUBCUTANEOUS at 23:40

## 2022-02-02 RX ADMIN — SODIUM CHLORIDE 125 MILLILITER(S): 9 INJECTION INTRAMUSCULAR; INTRAVENOUS; SUBCUTANEOUS at 16:43

## 2022-02-02 RX ADMIN — Medication 102 MILLIGRAM(S): at 17:48

## 2022-02-02 RX ADMIN — HEPARIN SODIUM 5000 UNIT(S): 5000 INJECTION INTRAVENOUS; SUBCUTANEOUS at 14:26

## 2022-02-02 RX ADMIN — Medication 81 MILLIGRAM(S): at 16:47

## 2022-02-02 RX ADMIN — MONTELUKAST 5 MILLIGRAM(S): 4 TABLET, CHEWABLE ORAL at 17:48

## 2022-02-02 RX ADMIN — Medication 250 MILLIGRAM(S): at 17:48

## 2022-02-02 RX ADMIN — SODIUM CHLORIDE 500 MILLILITER(S): 9 INJECTION INTRAMUSCULAR; INTRAVENOUS; SUBCUTANEOUS at 08:43

## 2022-02-02 NOTE — H&P ADULT - HISTORY OF PRESENT ILLNESS
44 years old female with h/o asthma, NSCLC with known brain metastases, S/P XRT and chemotherapy at [facility] lung cancer S/P left craniotomy and resection of mass in 09/2021 (Dr Parviz Paul) present to ED with slurry speech started at 6AM, last known normal was 4AM.  Reported 1-2 episode of vomiting last night. Per , patient has slight weakness on right leg.   Tachycardic to 120s ( per patient, HR in 120), BP stable, afebrile sat well at RA. WBC 13.36, K 3.5, Cr 1.17, AST/ALT 1207/1231, lipase normal, CK 78. CT head with Interval decompressive left frontotemporal craniectomy with decreased mass effect and resolved rightward midline shift secondary to significant left frontal vasogenic edema. No acute intracranial hemorrhage, extra-axial collection or new suspicious region of vasogenic edema. CTA head/neck with no vaso-occlusive disease. CT perfusion-26 mL of tissue with elevated time to maximum in the left superior medial frontal lobe, likely representing chronic posttreatment changes. Not a candidate for TPA.     SH: no toxic habits  FH: no family h/o HTN, DM 44 years old female with h/o asthma, NSCLC with known brain metastases, S/P XRT and chemotherapy at [facility] lung cancer S/P left craniotomy and resection of mass in 09/2021 (Dr Parviz Paul) present to ED with slurry speech started at 6AM, last known normal was 4AM.  Reported 1-2 episode of vomiting last night. Per , patient has slight weakness on right leg.   Tachycardic to 120s ( per patient, baseline HR in 120), BP stable, afebrile sat well at RA. WBC 13.36, K 3.5, Cr 1.17, AST/ALT 1207/1231, lipase normal, CK 78. CT head with Interval decompressive left frontotemporal craniectomy with decreased mass effect and resolved rightward midline shift secondary to significant left frontal vasogenic edema. No acute intracranial hemorrhage, extra-axial collection or new suspicious region of vasogenic edema. CTA head/neck with no vaso-occlusive disease. CT perfusion-26 mL of tissue with elevated time to maximum in the left superior medial frontal lobe, likely representing chronic posttreatment changes. Not a candidate for TPA.     SH: no toxic habits  FH: no family h/o HTN, DM

## 2022-02-02 NOTE — ED ADULT TRIAGE NOTE - CHIEF COMPLAINT QUOTE
pt biba from home accompanied with  Josesito  states that pt woke around 4am to get ready for doctor appt,   +slurred speech around 6 am and Rt sided weakness hx lung CA on chemo

## 2022-02-02 NOTE — PATIENT PROFILE ADULT - FALL HARM RISK - HARM RISK INTERVENTIONS

## 2022-02-02 NOTE — EEG REPORT - NS EEG TEXT BOX
[facility]   COMPREHENSIVE EPILEPSY CENTER   REPORT OF ROUTINE VIDEO EEG     [facility]: [address], [place] [address], Ph#: [phone]  [facility]: [address], [place] [address], Ph#: [phone]  [facility]: [address] [place] [address], Ph#: [phone]    Patient Name: VENANCIO CHAIREZ  Age and : 47y (71)  MRN #: [id number]  Location: [place] [address]  Referring Physician: Aubrey Taylor    Study Date: 22    _____________________________________________________________  TECHNICAL INFORMATION    Placement and Labeling of Electrodes:  The EEG was performed utilizing 20 channels referential EEG connections (coronal over temporal over parasagittal montage) using all standard 10-20 electrode placements with EKG.  Recording was at a sampling rate of 256 samples per second per channel.  Time synchronized digital video recording was done simultaneously with EEG recording.  A low light infrared camera was used for low light recording.  Abelardo and seizure detection algorithms were utilized.    _____________________________________________________________  HISTORY    Patient is a 47y old  Female who presents with a chief complaint of CVA (2022 12:58)    PERTINENT MEDICATION:  valproic acid 250 milliGRAM(s) Oral two times a day  _____________________________________________________________  STUDY INTERPRETATION    Findings: The background was continuous, spontaneously variable and reactive. During wakefulness, the posterior dominant rhythm consisted of 9 Hz activity better seen on the left, with amplitude to 30 uV, that attenuated to eye opening.     Background Slowing:  No generalized background slowing was present.    Focal Slowing:   Continuous left hemispheric delta slowing      Sleep Background:  Stage II sleep transients were not recorded.    Other Non-Epileptiform Findings:  Breach effect max in left hemisphere characterized by higher amplitude, sharply contoured waves    Interictal Epileptiform Activity:   None were present.    Events:  Clinical events: None recorded.  Seizures: None recorded.    Activation Procedures:   Hyperventilation was not performed.    Photic stimulation was not performed.     Artifacts:  Intermittent myogenic and movement artifacts were noted.    ECG:  The heart rate on single channel ECG was predominantly between 100-110 BPM.    _____________________________________________________________  EEG SUMMARY/CLASSIFICATION    Abnormal EEG in the awake state  - Continuous left hemispheric delta slowing   - Breach effect max in left hemisphere characterized by higher amplitude, sharply contoured waves  _____________________________________________________________  EEG IMPRESSION/CLINICAL CORRELATE    Abnormal EEG study.  Left hemispheric structural abnormality  No epileptiform pattern or seizure seen.  Skull defect max in the left frontocentral and central temporal regions    Yonathan Ortega MD PGY-5  Epilepsy Fellow    This Preliminary report is based on fellow review. Final report pending attending review.    Reading Room: [phone]  On Call Service After Hours: [phone]    [facility]   COMPREHENSIVE EPILEPSY CENTER   REPORT OF ROUTINE VIDEO EEG     [facility]: [address], [place] [address], Ph#: [phone]  [facility]: [address], [place] [address], Ph#: [phone]  [facility]: [address] [place] [address], Ph#: [phone]    Patient Name: VENANCIO CHAIREZ  Age and : 47y (71)  MRN #: [id number]  Location: [place] [address]  Referring Physician: Aubrey Taylor    Study Date: 22    _____________________________________________________________  TECHNICAL INFORMATION    Placement and Labeling of Electrodes:  The EEG was performed utilizing 20 channels referential EEG connections (coronal over temporal over parasagittal montage) using all standard 10-20 electrode placements with EKG.  Recording was at a sampling rate of 256 samples per second per channel.  Time synchronized digital video recording was done simultaneously with EEG recording.  A low light infrared camera was used for low light recording.  Abelardo and seizure detection algorithms were utilized.    _____________________________________________________________  HISTORY    Patient is a 47y old  Female who presents with a chief complaint of CVA (2022 12:58)    PERTINENT MEDICATION:  valproic acid 250 milliGRAM(s) Oral two times a day  _____________________________________________________________  STUDY INTERPRETATION    Findings: The background was continuous, spontaneously variable and reactive. During wakefulness, the posterior dominant rhythm consisted of 9 Hz activity better seen on the left, with amplitude to 30 uV, that attenuated to eye opening.     Background Slowing:  No generalized background slowing was present.    Focal Slowing:   Continuous left hemispheric delta slowing      Sleep Background:  Stage II sleep transients were not recorded.    Other Non-Epileptiform Findings:  Breach effect max in left hemisphere characterized by higher amplitude, sharply contoured waves    Interictal Epileptiform Activity:   None were present.    Events:  Clinical events: None recorded.  Seizures: None recorded.    Activation Procedures:   Hyperventilation was not performed.    Photic stimulation was not performed.     Artifacts:  Intermittent myogenic and movement artifacts were noted.    ECG:  The heart rate on single channel ECG was predominantly between 100-110 BPM.    _____________________________________________________________  EEG SUMMARY/CLASSIFICATION    Abnormal EEG in the awake state  - Continuous left hemispheric delta slowing   - Breach effect max in left hemisphere characterized by higher amplitude, sharply contoured waves  _____________________________________________________________  EEG IMPRESSION/CLINICAL CORRELATE    Abnormal EEG study.  Left hemispheric structural abnormality  No epileptiform pattern or seizure seen.  Skull defect max in the left frontocentral and central temporal regions    Yonathan Ortega MD PGY-5  Epilepsy Fellow    Reading Room: [phone]  On Call Service After Hours: [phone]     Thien Brady MD  Neurology Attending Physician

## 2022-02-02 NOTE — ED PROVIDER NOTE - CLINICAL SUMMARY MEDICAL DECISION MAKING FREE TEXT BOX
48 yo F presenting with stroke like symptoms, last known normal 3.5 hours pta, pt with hx brain metastasis- relative contraindication for tPA, telestroke team consulted, pt tba vs transfer for nsgy 46 yo F presenting with stroke like symptoms, last known normal 3.5 hours pta, pt with hx brain metastasis- relative contraindication for tPA, telestroke team consulted, pt tba vs transfer for nsgy    vasogenic edema on CT, no tPA per telestroke recs. pending CTA and Ct perfusion read, likely c/s nsgy and dispo per recs 48 yo F presenting with stroke like symptoms, last known normal 3.5 hours pta, pt with hx brain metastasis- relative contraindication for tPA, telestroke team consulted, pt tba vs transfer for nsgy    vasogenic edema on CT, no tPA per telestroke recs. pending CTA and Ct perfusion read

## 2022-02-02 NOTE — SWALLOW BEDSIDE ASSESSMENT ADULT - COMMENTS
PMH asthma, NSCLC with known brain metastases, S/P XRT and chemotherapy at [facility] lung cancer S/P left craniotomy and resection of mass in 09/2021 2/2 CT brain Interval decompressive left frontotemporal craniectomy with decreased   mass effect and resolved rightward midline shift secondary to significant   left frontal vasogenic edema that is discussed above. No acute   intracranial hemorrhage, extra-axial collection or new suspicious region   of vasogenic edema. Additional findings as above.  2/2 MR head New postop changes are identified with a large area of   abnormal enhancement seen involving the left frontal cortical subcortical   region with increased surrounding edema mass effect on left lateral   ventricle left-to-right shift. This finding is worrisome for progression   of patient's underlying disease process though the possibility of   treatment-related changes must be considered. Clinical correlation and   continued close interval is recommended. MRI perfusion can also be done   for further evaluation.

## 2022-02-02 NOTE — H&P ADULT - PROBLEM SELECTOR PLAN 1
present with slurry speech and slight right leg weakness  Not a candidate for TPA  CT head with Interval decompressive left frontotemporal craniectomy with decreased mass effect and resolved rightward midline shift secondary to significant left frontal vasogenic edema. No acute intracranial hemorrhage, extra-axial collection or new suspicious region of vasogenic edema. CTA head/neck with no vaso-occlusive disease. CT perfusion-26 mL of tissue with elevated time to maximum in the left superior medial frontal lobe, likely representing chronic posttreatment changes  Neurology consulted  Aspirin 80mg  No statin due to elevated LFT  MRI brain with and without contrast  EEG  Dexamethasone 10mg stat and 4mg bid  Neuro check  PT/OT/Speech ry

## 2022-02-02 NOTE — ED PROVIDER NOTE - PROGRESS NOTE DETAILS
Silverio: pt speech back to baseline, moving all extremities. states baseline HR in 120's, denies cp, sob.  states he is unaware of any mets to liver or history of elevated LFTs.

## 2022-02-02 NOTE — ED ADULT NURSE NOTE - OBJECTIVE STATEMENT
pt A&Ox2 BIBA, per EMS And triage nurse pt with complaints of slurred speech and right sided weakness that start at 6am. PMH of lung cancer and asthma.   reports that the patient is on chemo pills. Patient with scar to left temporal region.

## 2022-02-02 NOTE — H&P ADULT - NSHPPHYSICALEXAM_GEN_ALL_CORE
CONSTITUTIONAL: Well developed, alert and cooperative, no acute distress  EYES: PERRL, EOMI, no scleral icterus  ENT: Mucosa moist, tongue normal.   NECK: Neck supple, trachea midline, non-tender  CARDIAC: Normal S1 and S2. Regular rate and rhythms. No murmurs. No Pedal edema. Peripheral pulses intact  LUNGS: Clear to auscultation, equal air entry both lungs. No rales, rhonchi, wheezing. Normal respiratory effort.   ABDOMEN: Soft, nondistended, nontender. No guarding or rebound tenderness. No hepatomegaly or splenomegaly. Bowel sound normal.  MUSCULOSKELETAL: S/P left cranitomy.  No clubbing or cyanosis. No significant deformity or joint abnormality.   NEUROLOGICAL: Slurry speech noted, no gross sensory deficit. Slight right leg weakness compare to left leg ( motor 4+/5)  SKIN: no lesions or eruptions. Normal turgor  PSYCHIATRIC: A&O x 3, appropriate mood and affect

## 2022-02-02 NOTE — CHART NOTE - NSCHARTNOTEFT_GEN_A_CORE
47 year old female with stage IV lung cancer with history of metastasis to head s/p craniectomy last year in hospital for stroke like symptoms.  LKN 0400hrs when she was walking around to go to the bathroom.  0600hrs she woke up with slurred speech.  Current NIHSS 10.  CTH with alejandra hypodense left frontoparietal, likely vasogenic edema.  Not a candidate for tPA due to frankhypodensity, history of metastasis to brain.  Not a candidate for mechanical thrombectomy due to no LVO.    Janki Lizarraga MD  PGY5  Vascular Neurology Fellow    Under the supervision of Dr. Richard Libman 47 year old female with stage IV lung cancer with history of metastasis to head s/p craniectomy last year in hospital for stroke like symptoms.  LKN 0400hrs when she was walking around to go to the bathroom.  0600hrs she woke up with slurred speech.  Current NIHSS 10.  CTH with alejandra hypodense left frontoparietal, likely vasogenic edema.  Not a candidate for tPA due to alejandra hypodensity, history of metastasis to brain.  Not a candidate for mechanical thrombectomy due to no LVO.    Janki Lizarraga MD  PGY5  Vascular Neurology Fellow    Under the supervision of Dr. Richard Libman    Stroke attending. Agree with above

## 2022-02-02 NOTE — H&P ADULT - ASSESSMENT
44 years old female with h/o asthma, NSCLC with known brain metastases, S/P XRT and chemotherapy at [facility] lung cancer S/P left craniotomy and resection of mass in 09/2021 (Dr Parviz Paul) present to ED with slurry speech started at 6AM, last known normal was 4AM  Tachycardic to 120s ( per patient, baseline HR in 120), BP stable, afebrile sat well at RA. WBC 13.36, K 3.5, Cr 1.17, AST/ALT 1207/1231, lipase normal, CK 78. CT head with Interval decompressive left frontotemporal craniectomy with decreased mass effect and resolved rightward midline shift secondary to significant left frontal vasogenic edema. No acute intracranial hemorrhage, extra-axial collection or new suspicious region of vasogenic edema. CTA head/neck with no vaso-occlusive disease. CT perfusion-26 mL of tissue with elevated time to maximum in the left superior medial frontal lobe, likely representing chronic posttreatment changes. Not a candidate for TPA.     Admitted with suspected CVA

## 2022-02-02 NOTE — ED PROVIDER NOTE - PHYSICAL EXAMINATION
VITALS: reviewed  GEN: NAD, A & O x 4  HEAD/EYES: NCAT, EOMI, anicteric sclerae  ENT: mucus membranes moist, oropharynx WNL, trachea midline,   RESP: lungs CTA with equal breath sounds bilaterally, chest wall nontender and atraumatic  CV: heart with reg rhythm S1, S2, distal pulses intact and symmetric bilaterally  ABDOMEN: soft, nondistended, nontender, no palpable masses  : no CVAT  MSK: extremities atraumatic and nontender, no edema, no asymmetry.  SKIN: warm, dry, no rash, no bruising, no cyanosis. color appropriate for ethnicity  NEURO: alert, mentating appropriately, no facial asymmetry.   PSYCH: Affect appropriate
Yes

## 2022-02-02 NOTE — ED PROVIDER NOTE - OBJECTIVE STATEMENT
48 yo F hx stage 4 NSCLC s/p XRT and chemotherapy at [facility], s/p L craniotomy and resection of mass in 2021, presenting with slurred speech reported by . Last known normal 4 am. Per , she was getting up to use the bathroom throughout the night, checked on her at 4 am, at which time her speech was normal. 6 am, noted to be slurring speech. Of note, went to event last night, noted to have episode of nb/nb emesis. No gait abnormality yesterday, no dizziness, no falls. Not on AC.

## 2022-02-02 NOTE — H&P ADULT - PROBLEM SELECTOR PLAN 3
NSCLC with known brain metastases, S/P XRT and chemotherapy at [facility] lung cancer S/P left craniotomy and resection of mass in 09/2021  On Tagrisso 80mg daily-  to bring home medication  Hematology/oncology consulted- Dr Sorenson

## 2022-02-03 LAB
A1C WITH ESTIMATED AVERAGE GLUCOSE RESULT: 5.5 % — SIGNIFICANT CHANGE UP (ref 4–5.6)
ALBUMIN SERPL ELPH-MCNC: 3.1 G/DL — LOW (ref 3.3–5)
ALP SERPL-CCNC: 308 U/L — HIGH (ref 40–120)
ALT FLD-CCNC: 762 U/L — HIGH (ref 12–78)
ANION GAP SERPL CALC-SCNC: 8 MMOL/L — SIGNIFICANT CHANGE UP (ref 5–17)
AST SERPL-CCNC: 293 U/L — HIGH (ref 15–37)
BILIRUB DIRECT SERPL-MCNC: 2.1 MG/DL — HIGH (ref 0–0.3)
BILIRUB INDIRECT FLD-MCNC: 0.8 MG/DL — SIGNIFICANT CHANGE UP (ref 0.2–1)
BILIRUB SERPL-MCNC: 2.9 MG/DL — HIGH (ref 0.2–1.2)
BUN SERPL-MCNC: 16 MG/DL — SIGNIFICANT CHANGE UP (ref 7–23)
CALCIUM SERPL-MCNC: 9.6 MG/DL — SIGNIFICANT CHANGE UP (ref 8.5–10.1)
CHLORIDE SERPL-SCNC: 108 MMOL/L — SIGNIFICANT CHANGE UP (ref 96–108)
CHOLEST SERPL-MCNC: 187 MG/DL — SIGNIFICANT CHANGE UP
CO2 SERPL-SCNC: 23 MMOL/L — SIGNIFICANT CHANGE UP (ref 22–31)
CREAT SERPL-MCNC: 0.85 MG/DL — SIGNIFICANT CHANGE UP (ref 0.5–1.3)
ESTIMATED AVERAGE GLUCOSE: 111 MG/DL — SIGNIFICANT CHANGE UP (ref 68–114)
GLUCOSE BLDC GLUCOMTR-MCNC: 82 MG/DL — SIGNIFICANT CHANGE UP (ref 70–99)
GLUCOSE SERPL-MCNC: 90 MG/DL — SIGNIFICANT CHANGE UP (ref 70–99)
HCT VFR BLD CALC: 38 % — SIGNIFICANT CHANGE UP (ref 34.5–45)
HDLC SERPL-MCNC: 88 MG/DL — SIGNIFICANT CHANGE UP
HGB BLD-MCNC: 12.1 G/DL — SIGNIFICANT CHANGE UP (ref 11.5–15.5)
IRON SATN MFR SERPL: 18 UG/DL — LOW (ref 30–160)
IRON SATN MFR SERPL: 9 % — LOW (ref 14–50)
LIPID PNL WITH DIRECT LDL SERPL: 90 MG/DL — SIGNIFICANT CHANGE UP
MAGNESIUM SERPL-MCNC: 2.9 MG/DL — HIGH (ref 1.6–2.6)
MCHC RBC-ENTMCNC: 29 PG — SIGNIFICANT CHANGE UP (ref 27–34)
MCHC RBC-ENTMCNC: 31.8 G/DL — LOW (ref 32–36)
MCV RBC AUTO: 91.1 FL — SIGNIFICANT CHANGE UP (ref 80–100)
NON HDL CHOLESTEROL: 99 MG/DL — SIGNIFICANT CHANGE UP
NRBC # BLD: 0 /100 WBCS — SIGNIFICANT CHANGE UP (ref 0–0)
PHOSPHATE SERPL-MCNC: 3.8 MG/DL — SIGNIFICANT CHANGE UP (ref 2.5–4.5)
PLATELET # BLD AUTO: 202 K/UL — SIGNIFICANT CHANGE UP (ref 150–400)
POTASSIUM SERPL-MCNC: 4 MMOL/L — SIGNIFICANT CHANGE UP (ref 3.5–5.3)
POTASSIUM SERPL-SCNC: 4 MMOL/L — SIGNIFICANT CHANGE UP (ref 3.5–5.3)
PROT SERPL-MCNC: 7.9 GM/DL — SIGNIFICANT CHANGE UP (ref 6–8.3)
RBC # BLD: 4.17 M/UL — SIGNIFICANT CHANGE UP (ref 3.8–5.2)
RBC # FLD: 15.7 % — HIGH (ref 10.3–14.5)
SODIUM SERPL-SCNC: 139 MMOL/L — SIGNIFICANT CHANGE UP (ref 135–145)
TIBC SERPL-MCNC: 198 UG/DL — LOW (ref 220–430)
TRIGL SERPL-MCNC: 45 MG/DL — SIGNIFICANT CHANGE UP
UIBC SERPL-MCNC: 180 UG/DL — SIGNIFICANT CHANGE UP (ref 110–370)
WBC # BLD: 2.04 K/UL — LOW (ref 3.8–10.5)
WBC # FLD AUTO: 2.04 K/UL — LOW (ref 3.8–10.5)

## 2022-02-03 PROCEDURE — 93306 TTE W/DOPPLER COMPLETE: CPT | Mod: 26

## 2022-02-03 PROCEDURE — 99233 SBSQ HOSP IP/OBS HIGH 50: CPT

## 2022-02-03 PROCEDURE — 74181 MRI ABDOMEN W/O CONTRAST: CPT | Mod: 26

## 2022-02-03 RX ADMIN — HEPARIN SODIUM 5000 UNIT(S): 5000 INJECTION INTRAVENOUS; SUBCUTANEOUS at 14:41

## 2022-02-03 RX ADMIN — Medication 4 MILLIGRAM(S): at 17:51

## 2022-02-03 RX ADMIN — MONTELUKAST 5 MILLIGRAM(S): 4 TABLET, CHEWABLE ORAL at 14:40

## 2022-02-03 RX ADMIN — Medication 250 MILLIGRAM(S): at 17:52

## 2022-02-03 RX ADMIN — HEPARIN SODIUM 5000 UNIT(S): 5000 INJECTION INTRAVENOUS; SUBCUTANEOUS at 21:15

## 2022-02-03 RX ADMIN — Medication 81 MILLIGRAM(S): at 14:40

## 2022-02-03 RX ADMIN — HEPARIN SODIUM 5000 UNIT(S): 5000 INJECTION INTRAVENOUS; SUBCUTANEOUS at 05:19

## 2022-02-03 RX ADMIN — SODIUM CHLORIDE 125 MILLILITER(S): 9 INJECTION INTRAMUSCULAR; INTRAVENOUS; SUBCUTANEOUS at 05:18

## 2022-02-03 RX ADMIN — Medication 4 MILLIGRAM(S): at 05:18

## 2022-02-03 RX ADMIN — Medication 250 MILLIGRAM(S): at 05:18

## 2022-02-03 NOTE — PROGRESS NOTE ADULT - SUBJECTIVE AND OBJECTIVE BOX
Patient is a 47y old  Female who presents with a chief complaint of CVA (2022 10:14)      HPI:  44 years old female with h/o asthma, NSCLC with known brain metastases, S/P XRT and chemotherapy at [facility] lung cancer S/P left craniotomy and resection of mass in 2021 (Dr Parviz Paul) present to ED with slurry speech started at 6AM, last known normal was 4AM.  Reported 1-2 episode of vomiting last night. Per , patient has slight weakness on right leg.   Tachycardic to 120s ( per patient, baseline HR in 120), BP stable, afebrile sat well at RA. WBC 13.36, K 3.5, Cr 1.17, AST/ALT 1207/1231, lipase normal, CK 78. CT head with Interval decompressive left frontotemporal craniectomy with decreased mass effect and resolved rightward midline shift secondary to significant left frontal vasogenic edema. No acute intracranial hemorrhage, extra-axial collection or new suspicious region of vasogenic edema. CTA head/neck with no vaso-occlusive disease. CT perfusion-26 mL of tissue with elevated time to maximum in the left superior medial frontal lobe, likely representing chronic posttreatment changes. Not a candidate for TPA.     SH: no toxic habits  FH: no family h/o HTN, DM (2022 11:00)      INTERVAL HPI/OVERNIGHT EVENTS:  MS better. The patient denies melena, hematochezia, hematemesis, nausea, vomiting, abdominal pain, constipation, diarrhea, or change in bowel movements     MEDICATIONS  (STANDING):  aspirin  chewable 81 milliGRAM(s) Enteral Tube daily  dexAMETHasone  Injectable 4 milliGRAM(s) IV Push two times a day  dextrose 40% Gel 15 Gram(s) Oral once  dextrose 50% Injectable 25 Gram(s) IV Push once  dextrose 50% Injectable 12.5 Gram(s) IV Push once  dextrose 50% Injectable 25 Gram(s) IV Push once  glucagon  Injectable 1 milliGRAM(s) IntraMuscular once  heparin   Injectable 5000 Unit(s) SubCutaneous every 8 hours  montelukast  Chewable 5 milliGRAM(s) Oral daily  sodium chloride 0.9%. 1000 milliLiter(s) (125 mL/Hr) IV Continuous <Continuous>  valproic acid 250 milliGRAM(s) Oral two times a day    MEDICATIONS  (PRN):  melatonin 3 milliGRAM(s) Oral at bedtime PRN Insomnia  prochlorperazine   Tablet 10 milliGRAM(s) Oral every 6 hours PRN nausea, vomiting      FAMILY HISTORY:      Allergies    codeine (Other)  latex (Rash)    Intolerances        PMH/PSH:  No pertinent past medical history    Malignant neoplasm of lung, unspecified laterality, unspecified part of lung    Gastroesophageal reflux disease, esophagitis presence not specified    Asthma, unspecified asthma severity, unspecified whether complicated, unspecified whether persistent    No significant past surgical history    No significant past surgical history    H/O craniotomy    Brain mass          REVIEW OF SYSTEMS:  CONSTITUTIONAL: No fever, weight loss,   EYES: No eye pain, visual disturbances, or discharge  ENMT:  No difficulty hearing, tinnitus, vertigo; No sinus or throat pain  NECK: No pain or stiffness  BREASTS: No pain, masses, or nipple discharge  RESPIRATORY: No cough, wheezing, chills or hemoptysis; No shortness of breath  CARDIOVASCULAR: No chest pain, palpitations, dizziness, or leg swelling  GASTROINTESTINAL: See above   GENITOURINARY: No dysuria, frequency, hematuria, or incontinence  NEUROLOGICAL: No headaches,  numbness, or tremors  SKIN: No itching, burning, rashes, or lesions   LYMPH NODES: No enlarged glands  ENDOCRINE: No heat or cold intolerance; No hair loss  MUSCULOSKELETAL: No joint pain or swelling; No muscle, back, or extremity pain  PSYCHIATRIC: No depression, anxiety, mood swings, or difficulty sleeping  HEME/LYMPH: No easy bruising, or bleeding gums  ALLERGY AND IMMUNOLOGIC: No hives or eczema    Vital Signs Last 24 Hrs  T(C): 36.3 (2022 10:44), Max: 38.9 (2022 15:47)  T(F): 97.4 (2022 10:44), Max: 102 (2022 15:47)  HR: 104 (2022 10:45) (89 - 136)  BP: 112/74 (2022 10:45) (106/72 - 112/74)  BP(mean): --  RR: 18 (2022 10:44) (18 - 20)  SpO2: 98% (2022 10:45) (98% - 98%)    PHYSICAL EXAM:  GENERAL: NAD, well-groomed, well-developed  HEAD:  Atraumatic, Normocephalic  EYES: EOMI, PERRLA, conjunctiva and sclera clear  NECK: Supple, No JVD, Normal thyroid  NERVOUS SYSTEM:  Alert & Oriented X3, Good concentration;  CHEST/LUNG: Clear to percussion bilaterally; No rales, rhonchi, wheezing, or rubs  HEART: Regular rate and rhythm; No murmurs, rubs, or gallops  ABDOMEN: Soft, Nontender, Nondistended; Bowel sounds present  EXTREMITIES:  2+ Peripheral Pulses, No clubbing, cyanosis, or edema  LYMPH: No lymphadenopathy noted  SKIN: No rashes or lesions    LAB   @ 08:00  amylase --   lipase 56                           12.1   2.04  )-----------( 202      ( 2022 07:05 )             38.0       CBC:   @ 07:05  WBC 2.04   Hgb 12.1   Hct 38.0   Plts 202  MCV 91.1   @ 08:00  WBC 13.36   Hgb 12.1   Hct 36.2   Plts 290  MCV 89.8      Chemistry:   @ 07:05  Na+ 139  K+ 4.0  Cl- 108  CO2 23  BUN 16  Cr 0.85      @ 08:00  Na+ 138  K+ 3.5  Cl- 103  CO2 26  BUN 13  Cr 1.17         Glucose, Serum: 90 mg/dL ( @ 07:05)  Glucose, Serum: 130 mg/dL ( @ 08:00)      2022 07:05    139    |  108    |  16     ----------------------------<  90     4.0     |  23     |  0.85   2022 08:00    138    |  103    |  13     ----------------------------<  130    3.5     |  26     |  1.17     Ca    9.6        2022 07:05  Ca    10.0       2022 08:00  Phos  3.8       2022 07:05  Mg     2.9       2022 07:05    TPro  7.9    /  Alb  3.1    /  TBili  2.9    /  DBili  2.1    /  AST  293    /  ALT  762    /  AlkPhos  308    2022 07:05  TPro  8.2    /  Alb  3.6    /  TBili  2.4    /  DBili  x      /  AST  1207   /  ALT  1231   /  AlkPhos  333    2022 08:00      PT/INR - ( 2022 08:00 )   PT: 16.6 sec;   INR: 1.46 ratio         PTT - ( 2022 08:00 )  PTT:32.3 sec    Urinalysis Basic - ( 2022 14:19 )    Color: Yellow / Appearance: Slightly Turbid / S.005 / pH: x  Gluc: x / Ketone: Negative  / Bili: Small / Urobili: 4 mg/dL   Blood: x / Protein: 30 mg/dL / Nitrite: Negative   Leuk Esterase: Trace / RBC: 0-2 /HPF / WBC 3-5   Sq Epi: x / Non Sq Epi: x / Bacteria: Occasional        CAPILLARY BLOOD GLUCOSE      POCT Blood Glucose.: 82 mg/dL (2022 05:27)  POCT Blood Glucose.: 104 mg/dL (2022 22:12)          RADIOLOGY & ADDITIONAL TESTS:    Imaging Personally Reviewed:  [ ] YES  [ ] NO    Consultant(s) Notes Reviewed:  [ ] YES  [ ] NO    Care Discussed with Consultants/Other Providers [ ] YES  [ ] NO

## 2022-02-03 NOTE — OCCUPATIONAL THERAPY INITIAL EVALUATION ADULT - BALANCE DISTURBANCE, IDENTIFIED IMPAIRMENT CONTRIBUTE, REHAB EVAL
impaired coordination/abnormal muscle tone/decreased sensation/impaired sensory feedback/decreased strength

## 2022-02-03 NOTE — PROGRESS NOTE ADULT - SUBJECTIVE AND OBJECTIVE BOX
Neurology consult    VENANCIO [id number]     Patient is a 47y old  Female who presents with a chief complaint of     HPI: 48 yo F hx stage 4 NSCLC s/p XRT and chemotherapy at [facility], s/p L craniotomy and resection of mass in , presenting with slurred speech reported by . Last known normal 4 am. Per , she was getting up to use the bathroom throughout the night, checked on her at 4 am, at which time her speech was normal. 6 am, noted to be slurring speech. Of note, went to event last night, noted to have episode of nb/nb emesis. No gait abnormality yesterday, no dizziness, no falls. Not on AC.    Patient seen and examined this am. No new events    MEDICATIONS:    aspirin  chewable 81 milliGRAM(s) Enteral Tube daily  dexAMETHasone  Injectable 4 milliGRAM(s) IV Push two times a day  dextrose 40% Gel 15 Gram(s) Oral once  dextrose 50% Injectable 25 Gram(s) IV Push once  dextrose 50% Injectable 12.5 Gram(s) IV Push once  dextrose 50% Injectable 25 Gram(s) IV Push once  glucagon  Injectable 1 milliGRAM(s) IntraMuscular once  heparin   Injectable 5000 Unit(s) SubCutaneous every 8 hours  melatonin 3 milliGRAM(s) Oral at bedtime PRN  montelukast  Chewable 5 milliGRAM(s) Oral daily  osimertinib 80 milliGRAM(s) Oral daily  prochlorperazine   Tablet 10 milliGRAM(s) Oral every 6 hours PRN  sodium chloride 0.9%. 1000 milliLiter(s) IV Continuous <Continuous>  valproic acid 250 milliGRAM(s) Oral two times a day      LABS:                          12.1   2.04  )-----------( 202      ( 2022 07:05 )             38.0     02-03    139  |  108  |  16  ----------------------------<  90  4.0   |  23  |  0.85    Ca    9.6      2022 07:05  Phos  3.8     02-03  Mg     2.9     02-03    TPro  7.9  /  Alb  3.1<L>  /  TBili  2.9<H>  /  DBili  2.1<H>  /  AST  293<H>  /  ALT  762<H>  /  AlkPhos  308<H>  -    CAPILLARY BLOOD GLUCOSE      POCT Blood Glucose.: 82 mg/dL (2022 05:27)  POCT Blood Glucose.: 104 mg/dL (2022 22:12)    PT/INR - ( 2022 08:00 )   PT: 16.6 sec;   INR: 1.46 ratio         PTT - ( 2022 08:00 )  PTT:32.3 sec  Urinalysis Basic - ( 2022 14:19 )    Color: Yellow / Appearance: Slightly Turbid / S.005 / pH: x  Gluc: x / Ketone: Negative  / Bili: Small / Urobili: 4 mg/dL   Blood: x / Protein: 30 mg/dL / Nitrite: Negative   Leuk Esterase: Trace / RBC: 0-2 /HPF / WBC 3-5   Sq Epi: x / Non Sq Epi: x / Bacteria: Occasional      I&O's Summary    2022 07:01  -  2022 07:00  --------------------------------------------------------  IN: 0 mL / OUT: 1000 mL / NET: -1000 mL      Vital Signs Last 24 Hrs  T(C): 36.9 (2022 05:15), Max: 38.9 (2022 15:47)  T(F): 98.4 (2022 05:15), Max: 102 (2022 15:47)  HR: 89 (2022 05:15) (89 - 136)  BP: 106/73 (2022 05:15) (106/72 - 160/90)  BP(mean): --  RR: 18 (2022 05:15) (18 - 22)  SpO2: 98% (2022 05:15) (96% - 98%)    On Neurological Examination:    Mental Status - Patient is alert, awake, oriented to self and location. wrong month psycho-motor slowing smiles to command,     Cranial Nerves - PERRL, EOMI, VFF, V1-V3 intact, no gross facial asymmetry, tongue/uvula midline    Motor Exam -   moves all ext RUE no drift with slower movements less coordinated       nml bulk/tone    Sensory    Intact to light touch and pinprick bilaterally    Coord: no obvious dysmetria     Gait -  deferred  RADIOLOGY  < from: CT Perfusion w/ Maps w/ IV Cont (22 @ 08:03) >  IMPRESSION:  CT angiogram neck:  -No vaso-occlusive disease.    CT angiogram head:  -No vaso-occlusive disease.    CT perfusion:  -26 mL of tissue with elevated time to maximum in the left superior   medial frontal lobe, likely representing chronic posttreatment changes.    Consider MRI if clinical concern for acute infarct or new metastases   persists.        < end of copied text >  < from: CT Brain Stroke Protocol (22 @ 07:51) >  IMPRESSION:  Interval decompressive left frontotemporal craniectomy with decreased   mass effect and resolved rightward midline shift secondary to significant   left frontal vasogenic edema that is discussed above. No acute   intracranial hemorrhage, extra-axial collection or new suspicious region   of vasogenic edema. Additional findings as above.    < end of copied text >            < from: MR Head w/wo IV Cont (22 @ 11:28) >  IMPRESSION: New postop changes are identified with a large area of   abnormal enhancement seen involving the left frontal cortical subcortical   region with increased surrounding edema mass effect on left lateral   ventricle left-to-right shift. This finding is worrisome for progression   of patient's underlying disease process though the possibility of   treatment-related changes must be considered. Clinical correlation and   continued close interval is recommended. MRI perfusion can also be done   for further evaluation.    Previously noted lesion involving posterior fossa and supratentorial   region are less conspicuous which is compatible with response to therapy.    --- End of Report ---      < end of copied text >        Abnormal EEG study.  Left hemispheric structural abnormality  No epileptiform pattern or seizure seen.  Skull defect max in the left frontocentral and central temporal regions

## 2022-02-03 NOTE — PHYSICAL THERAPY INITIAL EVALUATION ADULT - GAIT TRAINING, PT EVAL
Patient will ambulate 300 feet with rolling walker independently in 1-2 weeks to navigate home setting.

## 2022-02-03 NOTE — PROGRESS NOTE ADULT - ASSESSMENT
44 years old female with h/o asthma, NSCLC with known brain metastases, S/P XRT and chemotherapy at [facility] lung cancer S/P left craniotomy and resection of mass in 09/2021 (Dr Parviz aPul) present to ED with slurry speech started at 6AM, last known normal was 4AM  Tachycardic to 120s ( per patient, baseline HR in 120), BP stable, afebrile sat well at RA. WBC 13.36, K 3.5, Cr 1.17, AST/ALT 1207/1231, lipase normal, CK 78. CT head with Interval decompressive left frontotemporal craniectomy with decreased mass effect and resolved rightward midline shift secondary to significant left frontal vasogenic edema. No acute intracranial hemorrhage, extra-axial collection or new suspicious region of vasogenic edema. CTA head/neck with no vaso-occlusive disease. CT perfusion-26 mL of tissue with elevated time to maximum in the left superior medial frontal lobe, likely representing chronic posttreatment changes. Not a candidate for TPA.     Admitted with suspected CVA    Problem/Plan - 1   ·  Problem: brain mass lesion w/surrounding edema   ·  Plan: present with slurry speech and slight right leg weakness  Not a candidate for TPA  CT head with Interval decompressive left frontotemporal craniectomy with decreased mass effect and resolved rightward midline shift secondary to significant left frontal vasogenic edema. No acute intracranial hemorrhage, extra-axial collection or new suspicious region of vasogenic edema. CTA head/neck with no vaso-occlusive disease. CT perfusion-26 mL of tissue with elevated time to maximum in the left superior medial frontal lobe, likely representing chronic posttreatment changes  Neurology consulted  Aspirin 81mg  No statin due to elevated LFT  MRI brain with and without contrast ;  New postop changes are identified with a large area of abnormal enhancement seen involving the left frontal cortical subcortical region with increased surrounding edema mass effect on left lateral ventricle left-to-right shift. This finding is worrisome for progression of patient's underlying disease process  EEG noted  Dexamethasone 10mg stat and 4mg bid  Neuro check  PT/OT/Speech eval passed; regular diet    Problem/Plan - 2   ·  Problem: Elevated LFTs. Transaminitis with CBD stone   ·  Plan: AST/ALT 1207/1231  New abnormality. No history of ETOH, drug use, recent change in medications  Acute hepatitis panel   GI consulted- Dr Singh  monitor LFT.  MRCP 2/3 preliminary CBD stone  Biliary consult    Problem/Plan - 3   ·  Problem: NSCLC metastatic to brain.   ·  Plan: NSCLC with known brain metastases, S/P XRT and chemotherapy at [facility] lung cancer S/P left craniotomy and resection of mass in 09/2021  On Tagrisso 80mg daily-  to bring home medication  Hematology/oncology consulted- Dr Sorenson.  Routine EEG with left sided dysfunction, no epileptogenic discharge  Continue with  Decadron 4mg BID for Brain edema  No need for Aspirin at this time    Problem/Plan - 4   ·  Problem: Leukocytosis.   ·  Plan: No sign of infection  Monitor CBC.    Problem/Plan - 5   ·  Problem: Mild intermittent asthma.   ·  Plan: not in exacerbation  albuterol prn.

## 2022-02-03 NOTE — OCCUPATIONAL THERAPY INITIAL EVALUATION ADULT - PLANNED THERAPY INTERVENTIONS, OT EVAL
energy conservation techniques/ADL retraining/IADL retraining/balance training/bed mobility training/fine motor coordination training/neuromuscular re-education/parent/caregiver training.../transfer training

## 2022-02-03 NOTE — CONSULT NOTE ADULT - PROBLEM SELECTOR RECOMMENDATION 9
- Call made out to Patients Oncologist Dr Shi awaiting call back  - on decadron for Brain edema  - GI f/u for Elevated LFT's  - Neurology f/u   - Suggest Neurosurgery Evaluation - Call made out to Patients Oncologist Dr Shi awaiting call back  - on decadron for Brain edema  - GI f/u for Elevated LFT's  - Neurology f/u   - Suggest Neurosurgery Evaluation    Addendum: Case was d/w Patients Oncologist ( Dr Shi) and Radiation Oncologist ( Dr Beavers)  - agreed with continue steroids to decrease edema and w/u for elevated LFT's and f/u as outpatient when acute issues resolved

## 2022-02-03 NOTE — OCCUPATIONAL THERAPY INITIAL EVALUATION ADULT - PRECAUTIONS/LIMITATIONS, REHAB EVAL
Pt is at risk for pathological Fracture due to metastases brain CA./cardiac precautions/fall precautions

## 2022-02-03 NOTE — PROGRESS NOTE ADULT - ASSESSMENT
HPI:  44 years old female with h/o asthma, NSCLC with known brain metastases, S/P XRT and chemotherapy at [facility] lung cancer S/P left craniotomy and resection of mass in 09/2021 (Dr Parviz Paul) present to ED with slurry speech started at 6AM, last known normal was 4AM.  Reported 1-2 episode of vomiting last night. Per , patient has slight weakness on right leg.   Tachycardic to 120s ( per patient, baseline HR in 120), BP stable, afebrile sat well at RA. WBC 13.36, K 3.5, Cr 1.17, AST/ALT 1207/1231, lipase normal, CK 78. CT head with Interval decompressive left frontotemporal craniectomy with decreased mass effect and resolved rightward midline shift secondary to significant left frontal vasogenic edema. No acute intracranial hemorrhage, extra-axial collection or new suspicious region of vasogenic edema. CTA head/neck with no vaso-occlusive disease. CT perfusion-26 mL of tissue with elevated time to maximum in the left superior medial frontal lobe, likely representing chronic posttreatment changes. Not a candidate for TPA.   ----- As Above ----- History obtained from  / MD / chart  The patient had been doing well until early this AM when she was noted to have a change in mentation and weakness. Neurological w/u in progress.   Called to see patient regarding elevated LFTs. New abnormality. No history of ETOH, drug use, recent change in medications, hepatitis or NSAIDs. Tattoo from 20 years ago. On Tagrisso since 2019.   Yesterday, patient had unexplained N/V. ? Abdominal pain.   Prior to this, the  denies melena, hematochezia, hematemesis, nausea, vomiting, abdominal pain, constipation, diarrhea, or change in bowel movements     A) Elevated LFTs-  MRCP preliminary CBD stone, ? GB  WBC 2.04, LFTs  - 2.4/1207/1231/333 --> 2.9/293/762/308  1) Check official results of MRCP 2) Biliary consult for tomorrow - already called.    B) N/V -  Patient passed speech evaluation 1) Advance diet as tolerated  === Discussed with patient and  for 15 minutes

## 2022-02-03 NOTE — OCCUPATIONAL THERAPY INITIAL EVALUATION ADULT - TRANSFER TRAINING, PT EVAL
Pt will transfer to all surfaces, safely and independently with the least restrictive adaptive device within 2-4 weeks.

## 2022-02-03 NOTE — OCCUPATIONAL THERAPY INITIAL EVALUATION ADULT - PERSONAL SAFETY AND JUDGMENT, REHAB EVAL
Pt needs verbal cues for proper hand placement and to safely maneuver rolling walker./at risk behaviors demonstrated

## 2022-02-03 NOTE — PHYSICAL THERAPY INITIAL EVALUATION ADULT - IMPAIRMENTS CONTRIBUTING TO GAIT DEVIATIONS, PT EVAL
required verbal/manual cues to maintain straight path with rolling walker/impaired balance/cognition/decreased flexibility/decreased ROM/decreased strength

## 2022-02-03 NOTE — OCCUPATIONAL THERAPY INITIAL EVALUATION ADULT - PERTINENT HX OF CURRENT PROBLEM, REHAB EVAL
Pt is a 48 y/o female who presented to ER due to acute onset of neurological deficits. MRI on 2/2/22 results confirm new postop changes are identified with a large area of abnormal enhancement seen involving the left frontal cortical subcortical. ..

## 2022-02-03 NOTE — SWALLOW BEDSIDE ASSESSMENT ADULT - COMMENTS
PMH asthma, NSCLC with known brain metastases, S/P XRT and chemotherapy at [facility] lung cancer S/P left craniotomy and resection of mass in 09/2021 2/2 CT brain Interval decompressive left frontotemporal craniectomy with decreased   mass effect and resolved rightward midline shift secondary to significant   left frontal vasogenic edema that is discussed above. No acute   intracranial hemorrhage, extra-axial collection or new suspicious region   of vasogenic edema. Additional findings as above.  2/2 MR head New postop changes are identified with a large area of   abnormal enhancement seen involving the left frontal cortical subcortical   region with increased surrounding edema mass effect on left lateral   ventricle left-to-right shift. This finding is worrisome for progression   of patient's underlying disease process though the possibility of   treatment-related changes must be considered. Clinical correlation and   continued close interval is recommended. MRI perfusion can also be done   for further evaluation. pt c/o of "nasty" taste with several trials but denied swallow difficulty or pharyngeal stasis

## 2022-02-03 NOTE — PHYSICAL THERAPY INITIAL EVALUATION ADULT - BALANCE TRAINING, PT EVAL
Patient will improve static/dynamic standing balance by one level with rolling walker in 1-2 weeks in order to improve ambulation.

## 2022-02-03 NOTE — SWALLOW BEDSIDE ASSESSMENT ADULT - SWALLOW EVAL: DIAGNOSIS
Evaluation initiated at bedside but not completed as pt was taken for CT scan; SLP will follow at a later time ; SANDI son
Oropharyngeal swallow mechanism is adequate for oral feedings; there are no overt s/s aspiration on exam; hx lung cancer with mets to brain and s/p left craniotomy

## 2022-02-03 NOTE — OCCUPATIONAL THERAPY INITIAL EVALUATION ADULT - LIVES WITH, PROFILE
in a private house with 2 step to enter without handrail. Once inside, pt has to negotiate a flight of stairs with B/L handrail  to access the bedroom and bathroom./children/spouse in a private house with 2 steps to enter without handrail. Once inside, pt has to negotiate a flight of stairs with B/L handrail to access the bedroom and bathroom. The bathroom has a tub/shower combination and standard toilet./children/spouse

## 2022-02-03 NOTE — SWALLOW BEDSIDE ASSESSMENT ADULT - SLP GENERAL OBSERVATIONS
alert and oriented to name and ; needed multiple cues for additional orientation questions; followed simple directions and engaged verbally; expressive language deficits observed

## 2022-02-03 NOTE — OCCUPATIONAL THERAPY INITIAL EVALUATION ADULT - SOCIAL CONCERNS
Pt voiced concerns about her recovery . Pt has the support of family members to assist him/her after discharge home ; however pt is alone during the days./Complex psychosocial needs/coping issues Pt voiced concerns about her recovery. Pt has the support of family members to assist her after discharge home ; however pt is alone during the days./Complex psychosocial needs/coping issues

## 2022-02-03 NOTE — OCCUPATIONAL THERAPY INITIAL EVALUATION ADULT - ADDITIONAL COMMENTS
Prior to admission, pt was functioning in her roles, self sufficient & ambulating independently without any assistive devices.  Pt has a rolling walker and SAC. Presently pt needs assistance with lower body self care tasks due to, weakness, Pt is right hand dominant and wears glasses for reading. Dexterity and fine motor skills are diminished. Prior to admission, pt was functioning in her roles, self sufficient & ambulating independently without any assistive devices. Pt has a rolling walker and SAC. Presently, pt needs assistance with lower body self care tasks due to, weakness, Pt is right hand dominant and wears glasses for reading. Dexterity and fine motor skills are diminished in right hand.

## 2022-02-03 NOTE — PROGRESS NOTE ADULT - SUBJECTIVE AND OBJECTIVE BOX
Patient is a 47y old  Female who presents with a chief complaint of CVA (2022 15:40)      OVERNIGHT EVENTS:    REVIEW OF SYSTEMS: denies chest pain/SOB, diaphoresis, no F/C, cough, dizziness, headache, blurry vision, nausea, vomiting, abdominal pain. All others review of systems negative     MEDICATIONS  (STANDING):  aspirin  chewable 81 milliGRAM(s) Enteral Tube daily  dexAMETHasone  Injectable 4 milliGRAM(s) IV Push two times a day  dextrose 40% Gel 15 Gram(s) Oral once  dextrose 50% Injectable 25 Gram(s) IV Push once  dextrose 50% Injectable 12.5 Gram(s) IV Push once  dextrose 50% Injectable 25 Gram(s) IV Push once  glucagon  Injectable 1 milliGRAM(s) IntraMuscular once  heparin   Injectable 5000 Unit(s) SubCutaneous every 8 hours  montelukast  Chewable 5 milliGRAM(s) Oral daily  sodium chloride 0.9%. 1000 milliLiter(s) (125 mL/Hr) IV Continuous <Continuous>  valproic acid 250 milliGRAM(s) Oral two times a day    MEDICATIONS  (PRN):  melatonin 3 milliGRAM(s) Oral at bedtime PRN Insomnia  prochlorperazine   Tablet 10 milliGRAM(s) Oral every 6 hours PRN nausea, vomiting      Allergies    codeine (Other)  latex (Rash)    Intolerances        T(F): 97.4 (22 @ 10:44), Max: 98.4 (22 @ 05:15)  HR: 104 (22 @ 10:45) (89 - 130)  BP: 112/74 (22 @ 10:45) (106/72 - 112/74)  RR: 18 (22 @ 10:44) (18 - 20)  SpO2: 98% (22 @ 10:45) (98% - 98%)  Wt(kg): --    PHYSICAL EXAM:  GENERAL: NAD  HEAD:  Atraumatic, Normocephalic  EYES: PERRLA, conjunctiva and sclera clear  ENMT: Moist mucous membranes  NECK: Supple, No JVD, Normal thyroid  NERVOUS SYSTEM:  Alert & Awake  CHEST/LUNG: Clear to percussion bilaterally;   HEART: Regular rate and rhythm;   ABDOMEN: Soft, Nontender, Nondistended; Bowel sounds present  EXTREMITIES:  no edema BL LE  SKIN: moist    LABS:                        12.1   2.04  )-----------( 202      ( 2022 07:05 )             38.0     02-03    139  |  108  |  16  ----------------------------<  90  4.0   |  23  |  0.85    Ca    9.6      2022 07:05  Phos  3.8     02-  Mg     2.9     -    TPro  7.9  /  Alb  3.1<L>  /  TBili  2.9<H>  /  DBili  2.1<H>  /  AST  293<H>  /  ALT  762<H>  /  AlkPhos  308<H>  -    PT/INR - ( 2022 08:00 )   PT: 16.6 sec;   INR: 1.46 ratio         PTT - ( 2022 08:00 )  PTT:32.3 sec  Urinalysis Basic - ( 2022 14:19 )    Color: Yellow / Appearance: Slightly Turbid / S.005 / pH: x  Gluc: x / Ketone: Negative  / Bili: Small / Urobili: 4 mg/dL   Blood: x / Protein: 30 mg/dL / Nitrite: Negative   Leuk Esterase: Trace / RBC: 0-2 /HPF / WBC 3-5   Sq Epi: x / Non Sq Epi: x / Bacteria: Occasional      Cultures;   CAPILLARY BLOOD GLUCOSE      POCT Blood Glucose.: 82 mg/dL (2022 05:27)  POCT Blood Glucose.: 104 mg/dL (2022 22:12)    Lipid panel:   LDL --  TG 45    CARDIAC MARKERS ( 2022 08:00 )  x     / x     / 78 U/L / x     / x            RADIOLOGY & ADDITIONAL TESTS:    Imaging Personally Reviewed:  [x ] YES      Consultant(s) Notes Reviewed:  [x ] YES     Care Discussed with [x ] Consultants [X ] Patient [ ] Family  [x ]    [x ]  Other; RN

## 2022-02-03 NOTE — PHYSICAL THERAPY INITIAL EVALUATION ADULT - PERTINENT HX OF CURRENT PROBLEM, REHAB EVAL
Patient is a 47 year old women presenting with onset of slurred speech. MR Head showing large area of abnormal enhancement seen involving the left frontal cortical subcortical region with increased surrounding edema mass. PMH includes lung cancer with metastasis to brain & s/p left craniotomy.

## 2022-02-03 NOTE — PROGRESS NOTE ADULT - ASSESSMENT
46 yo F hx stage 4 NSCLC s/p XRT and chemotherapy at [facility], s/p L craniotomy and resection of mass in 2021, presenting with slurred speech. On PE advanced psychomotor slowing poor attention RUE drift. CTH s/p le crani with l frontal vasogenic edema CTA no acute findings. MRI concerning for progression of disease vs post-op changes with surrounding edema    Impression:  mass lesion    MRI brain with and w/o con  Routine EEG with left sided dysfunction, no epileptogenic discharge  Continue with  Decadron 4mg BID,   Consider Oncology input  on  mg BID, consider checking level  No need for Aspirin at this time  F/u with her outpt Oncologist, MAKENZIE to clarify if any further intervention or w/u   48 yo F hx stage 4 NSCLC s/p XRT and chemotherapy at [facility], s/p L craniotomy and resection of mass in 2021, presenting with slurred speech. On PE advanced psychomotor slowing poor attention RUE drift. CTH s/p le crani with l frontal vasogenic edema CTA no acute findings. MRI concerning for progression of disease vs post-op changes with surrounding edema    Impression:  mass lesion    MRI brain with and w/o con reviewed  Routine EEG with left sided dysfunction, no epileptogenic discharge  Continue with  Decadron 4mg BID,   Consider Oncology input  on  mg BID, consider checking level  No need for Aspirin at this time  F/u with her outpt Oncologist, MAKENZIE to clarify if any further intervention or w/u

## 2022-02-03 NOTE — OCCUPATIONAL THERAPY INITIAL EVALUATION ADULT - GENERAL OBSERVATIONS, REHAB EVAL
Pt was seen for initial OT consult, encountered in bed on cardiac monitoring. Pt has hx lung cancer with mets to brain &  s/p left craniotomy. Pt was AA&Ox4, cooperative & followed commands. Pt presents with generalized weakess ; this limits pt's activity tolerance ,balance, ADL management & functional mobility. Pt performed bed mobility with supervision and use of bed rail Pt was seen for initial OT consult, encountered in bed on cardiac monitoring. Pt has hx lung cancer with mets to brain &  s/p left craniotomy. Pt was AA&Ox4, cooperative & followed commands. Pt presents with generalized weakess ; this limits pt's activity tolerance ,balance, ADL management & functional mobility.

## 2022-02-04 DIAGNOSIS — K80.50 CALCULUS OF BILE DUCT WITHOUT CHOLANGITIS OR CHOLECYSTITIS WITHOUT OBSTRUCTION: ICD-10-CM

## 2022-02-04 LAB
ALBUMIN SERPL ELPH-MCNC: 2.9 G/DL — LOW (ref 3.3–5)
ALP SERPL-CCNC: 296 U/L — HIGH (ref 40–120)
ALT FLD-CCNC: 471 U/L — HIGH (ref 12–78)
ANION GAP SERPL CALC-SCNC: 9 MMOL/L — SIGNIFICANT CHANGE UP (ref 5–17)
AST SERPL-CCNC: 116 U/L — HIGH (ref 15–37)
BILIRUB SERPL-MCNC: 3.1 MG/DL — HIGH (ref 0.2–1.2)
BUN SERPL-MCNC: 12 MG/DL — SIGNIFICANT CHANGE UP (ref 7–23)
CALCIUM SERPL-MCNC: 9.4 MG/DL — SIGNIFICANT CHANGE UP (ref 8.5–10.1)
CHLORIDE SERPL-SCNC: 105 MMOL/L — SIGNIFICANT CHANGE UP (ref 96–108)
CO2 SERPL-SCNC: 24 MMOL/L — SIGNIFICANT CHANGE UP (ref 22–31)
CREAT SERPL-MCNC: 0.76 MG/DL — SIGNIFICANT CHANGE UP (ref 0.5–1.3)
GLUCOSE BLDC GLUCOMTR-MCNC: 101 MG/DL — HIGH (ref 70–99)
GLUCOSE BLDC GLUCOMTR-MCNC: 108 MG/DL — HIGH (ref 70–99)
GLUCOSE BLDC GLUCOMTR-MCNC: 114 MG/DL — HIGH (ref 70–99)
GLUCOSE BLDC GLUCOMTR-MCNC: 128 MG/DL — HIGH (ref 70–99)
GLUCOSE BLDC GLUCOMTR-MCNC: 131 MG/DL — HIGH (ref 70–99)
GLUCOSE BLDC GLUCOMTR-MCNC: 157 MG/DL — HIGH (ref 70–99)
GLUCOSE SERPL-MCNC: 105 MG/DL — HIGH (ref 70–99)
HCT VFR BLD CALC: 37.2 % — SIGNIFICANT CHANGE UP (ref 34.5–45)
HGB BLD-MCNC: 12.2 G/DL — SIGNIFICANT CHANGE UP (ref 11.5–15.5)
MCHC RBC-ENTMCNC: 29.5 PG — SIGNIFICANT CHANGE UP (ref 27–34)
MCHC RBC-ENTMCNC: 32.8 G/DL — SIGNIFICANT CHANGE UP (ref 32–36)
MCV RBC AUTO: 89.9 FL — SIGNIFICANT CHANGE UP (ref 80–100)
MITOCHONDRIA AB SER-ACNC: SIGNIFICANT CHANGE UP
NRBC # BLD: 0 /100 WBCS — SIGNIFICANT CHANGE UP (ref 0–0)
PLATELET # BLD AUTO: 182 K/UL — SIGNIFICANT CHANGE UP (ref 150–400)
POTASSIUM SERPL-MCNC: 3.7 MMOL/L — SIGNIFICANT CHANGE UP (ref 3.5–5.3)
POTASSIUM SERPL-SCNC: 3.7 MMOL/L — SIGNIFICANT CHANGE UP (ref 3.5–5.3)
PROT SERPL-MCNC: 8 GM/DL — SIGNIFICANT CHANGE UP (ref 6–8.3)
RBC # BLD: 4.14 M/UL — SIGNIFICANT CHANGE UP (ref 3.8–5.2)
RBC # FLD: 15.8 % — HIGH (ref 10.3–14.5)
SMOOTH MUSCLE AB SER-ACNC: SIGNIFICANT CHANGE UP
SODIUM SERPL-SCNC: 138 MMOL/L — SIGNIFICANT CHANGE UP (ref 135–145)
WBC # BLD: 6.05 K/UL — SIGNIFICANT CHANGE UP (ref 3.8–10.5)
WBC # FLD AUTO: 6.05 K/UL — SIGNIFICANT CHANGE UP (ref 3.8–10.5)

## 2022-02-04 PROCEDURE — 99233 SBSQ HOSP IP/OBS HIGH 50: CPT

## 2022-02-04 RX ORDER — METOPROLOL TARTRATE 50 MG
5 TABLET ORAL EVERY 6 HOURS
Refills: 0 | Status: DISCONTINUED | OUTPATIENT
Start: 2022-02-04 | End: 2022-02-05

## 2022-02-04 RX ORDER — SODIUM CHLORIDE 9 MG/ML
1000 INJECTION INTRAMUSCULAR; INTRAVENOUS; SUBCUTANEOUS
Refills: 0 | Status: DISCONTINUED | OUTPATIENT
Start: 2022-02-04 | End: 2022-02-13

## 2022-02-04 RX ADMIN — Medication 4 MILLIGRAM(S): at 05:34

## 2022-02-04 RX ADMIN — Medication 250 MILLIGRAM(S): at 05:34

## 2022-02-04 RX ADMIN — Medication 4 MILLIGRAM(S): at 17:02

## 2022-02-04 RX ADMIN — HEPARIN SODIUM 5000 UNIT(S): 5000 INJECTION INTRAVENOUS; SUBCUTANEOUS at 21:22

## 2022-02-04 RX ADMIN — Medication 5 MILLIGRAM(S): at 09:42

## 2022-02-04 RX ADMIN — SODIUM CHLORIDE 100 MILLILITER(S): 9 INJECTION INTRAMUSCULAR; INTRAVENOUS; SUBCUTANEOUS at 17:02

## 2022-02-04 RX ADMIN — MONTELUKAST 5 MILLIGRAM(S): 4 TABLET, CHEWABLE ORAL at 11:21

## 2022-02-04 RX ADMIN — HEPARIN SODIUM 5000 UNIT(S): 5000 INJECTION INTRAVENOUS; SUBCUTANEOUS at 13:10

## 2022-02-04 RX ADMIN — Medication 250 MILLIGRAM(S): at 17:02

## 2022-02-04 RX ADMIN — HEPARIN SODIUM 5000 UNIT(S): 5000 INJECTION INTRAVENOUS; SUBCUTANEOUS at 05:34

## 2022-02-04 RX ADMIN — Medication 81 MILLIGRAM(S): at 11:21

## 2022-02-04 RX ADMIN — Medication 3 MILLIGRAM(S): at 21:21

## 2022-02-04 NOTE — PROGRESS NOTE ADULT - PROBLEM SELECTOR PLAN 1
- Continue Steroids   - consider repeating MRI in few days to see if swelling has decreased  - GI f/u  - Case was d/w Patients Oncologist ( Dr Shi) and Radiation Oncologist ( Dr Beavers)

## 2022-02-04 NOTE — PROGRESS NOTE ADULT - ASSESSMENT
46 yo F hx stage 4 NSCLC s/p XRT and chemotherapy at [facility], s/p L craniotomy and resection of mass in 2021, presenting with slurred speech. On PE advanced psychomotor slowing poor attention RUE drift. CTH s/p le crani with l frontal vasogenic edema CTA no acute findings. MRI concerning for progression of disease vs post-op changes with surrounding edema    Impression:  mass lesion    MRI brain with and w/o con reviewed  Routine EEG with left sided dysfunction, no epileptogenic discharge  Continue with  Decadron 4mg BID, to be titrated as an outpt  on  mg BID, consider checking level  No need for Aspirin from a stroke perspective at this time  F/u with her outpt Oncologist, MAKENZIE to clarify if any further intervention or w/u  Can follow up with Neurology, Dr. Donaldo Polk at [phone]

## 2022-02-04 NOTE — PROGRESS NOTE ADULT - ASSESSMENT
44 years old female with h/o asthma, NSCLC with known brain metastases, S/P XRT and chemotherapy at [facility] lung cancer S/P left craniotomy and resection of mass in 09/2021 (Dr Parviz Paul) present to ED with slurry speech started at 6AM, last known normal was 4AM  Tachycardic to 120s ( per patient, baseline HR in 120), BP stable, afebrile sat well at RA. WBC 13.36, K 3.5, Cr 1.17, AST/ALT 1207/1231, lipase normal, CK 78. CT head with Interval decompressive left frontotemporal craniectomy with decreased mass effect and resolved rightward midline shift secondary to significant left frontal vasogenic edema. No acute intracranial hemorrhage, extra-axial collection or new suspicious region of vasogenic edema. CTA head/neck with no vaso-occlusive disease. CT perfusion-26 mL of tissue with elevated time to maximum in the left superior medial frontal lobe, likely representing chronic posttreatment changes. Not a candidate for TPA.     Admitted with suspected CVA    Problem/Plan - 1   ·  Problem: brain mass lesion w/surrounding edema   ·  Plan: present with slurry speech and slight right leg weakness  Not a candidate for TPA  CT head with Interval decompressive left frontotemporal craniectomy with decreased mass effect and resolved rightward midline shift secondary to significant left frontal vasogenic edema. No acute intracranial hemorrhage, extra-axial collection or new suspicious region of vasogenic edema. CTA head/neck with no vaso-occlusive disease. CT perfusion-26 mL of tissue with elevated time to maximum in the left superior medial frontal lobe, likely representing chronic posttreatment changes  Neurology consulted  Aspirin 81mg  No statin due to elevated LFT  MRI brain with and without contrast ;  New postop changes are identified with a large area of abnormal enhancement seen involving the left frontal cortical subcortical region with increased surrounding edema mass effect on left lateral ventricle left-to-right shift. This finding is worrisome for progression of patient's underlying disease process  EEG noted  Dexamethasone 10mg stat and 4mg bid  Neuro check  PT/OT/Speech eval passed; regular diet  MRI in few days to see if swelling has decreased    Problem/Plan - 2   ·  Problem: Elevated LFTs. Transaminitis with CBD stone   ·  Plan: AST/ALT 1207/1231  New abnormality. No history of ETOH, drug use, recent change in medications  Acute hepatitis panel   GI consulted- Dr Singh  monitor LFT.  plan for MRCP 2/3 preliminary CBD stone  Biliary consult/Dr. Herrera/Francesca    Problem/Plan - 3   ·  Problem: NSCLC metastatic to brain.   ·  Plan: NSCLC with known brain metastases, S/P XRT and chemotherapy at [facility] lung cancer S/P left craniotomy and resection of mass in 09/2021  On Tagrisso 80mg daily-  to bring home medication  Hematology/oncology consulted- Dr Sorenson.  Routine EEG with left sided dysfunction, no epileptogenic discharge  Continue with  Decadron 4mg BID for Brain edema  No need for Aspirin at this time    Problem/Plan - 4   ·  Problem: Leukocytosis.   ·  Plan: No sign of infection  Monitor CBC.    Problem/Plan - 5   ·  Problem: Mild intermittent asthma.   ·  Plan: not in exacerbation  albuterol prn.

## 2022-02-04 NOTE — PROGRESS NOTE ADULT - SUBJECTIVE AND OBJECTIVE BOX
Neurology consult    VENANCIO [id number]     Patient is a 47y old  Female who presents with a chief complaint of     HPI: 48 yo F hx stage 4 NSCLC s/p XRT and chemotherapy at [facility], s/p L craniotomy and resection of mass in , presenting with slurred speech reported by . Last known normal 4 am. Per , she was getting up to use the bathroom throughout the night, checked on her at 4 am, at which time her speech was normal. 6 am, noted to be slurring speech. Of note, went to event last night, noted to have episode of nb/nb emesis. No gait abnormality yesterday, no dizziness, no falls. Not on AC.    Patient seen and examined this am. No new events    MEDICATIONS:    aspirin  chewable 81 milliGRAM(s) Enteral Tube daily  dexAMETHasone  Injectable 4 milliGRAM(s) IV Push two times a day  dextrose 40% Gel 15 Gram(s) Oral once  dextrose 50% Injectable 25 Gram(s) IV Push once  dextrose 50% Injectable 12.5 Gram(s) IV Push once  dextrose 50% Injectable 25 Gram(s) IV Push once  glucagon  Injectable 1 milliGRAM(s) IntraMuscular once  heparin   Injectable 5000 Unit(s) SubCutaneous every 8 hours  melatonin 3 milliGRAM(s) Oral at bedtime PRN  metoprolol tartrate Injectable 5 milliGRAM(s) IV Push every 6 hours  montelukast  Chewable 5 milliGRAM(s) Oral daily  prochlorperazine   Tablet 10 milliGRAM(s) Oral every 6 hours PRN  sodium chloride 0.9%. 1000 milliLiter(s) IV Continuous <Continuous>  valproic acid 250 milliGRAM(s) Oral two times a day      LABS:                          12.1   2.04  )-----------(       ( 2022 07:05 )             38.0     02-03    139  |  108  |  16  ----------------------------<  90  4.0   |  23  |  0.85    Ca    9.6      2022 07:05  Phos  3.8     02-03  Mg     2.9     02-03    TPro  7.9  /  Alb  3.1<L>  /  TBili  2.9<H>  /  DBili  2.1<H>  /  AST  293<H>  /  ALT  762<H>  /  AlkPhos  308<H>  02-03    CAPILLARY BLOOD GLUCOSE      POCT Blood Glucose.: 101 mg/dL (2022 07:45)  POCT Blood Glucose.: 114 mg/dL (2022 05:40)  POCT Blood Glucose.: 128 mg/dL (2022 01:46)      Urinalysis Basic - ( 2022 14:19 )    Color: Yellow / Appearance: Slightly Turbid / S.005 / pH: x  Gluc: x / Ketone: Negative  / Bili: Small / Urobili: 4 mg/dL   Blood: x / Protein: 30 mg/dL / Nitrite: Negative   Leuk Esterase: Trace / RBC: 0-2 /HPF / WBC 3-5   Sq Epi: x / Non Sq Epi: x / Bacteria: Occasional      I&O's Summary    2022 07:01  -  2022 07:00  --------------------------------------------------------  IN: 260 mL / OUT: 150 mL / NET: 110 mL      Vital Signs Last 24 Hrs  T(C): 36.9 (2022 04:59), Max: 37.1 (2022 16:01)  T(F): 98.4 (2022 04:59), Max: 98.7 (2022 16:01)  HR: 148 (2022 09:37) (78 - 148)  BP: 136/80 (2022 09:37) (109/77 - 136/80)  BP(mean): --  RR: 18 (2022 04:59) (18 - 19)  SpO2: 99% (2022 04:59) (98% - 100%)    On Neurological Examination:    Mental Status - Patient is alert, awake, oriented to self and location. wrong month psycho-motor slowing smiles to command,     Cranial Nerves - PERRL, EOMI, VFF, V1-V3 intact, no gross facial asymmetry, tongue/uvula midline    Motor Exam -   moves all ext RUE no drift with slower movements less coordinated       nml bulk/tone    Sensory    Intact to light touch and pinprick bilaterally    Coord: no obvious dysmetria     Gait -  deferred  RADIOLOGY  < from: CT Perfusion w/ Maps w/ IV Cont (22 @ 08:03) >  IMPRESSION:  CT angiogram neck:  -No vaso-occlusive disease.    CT angiogram head:  -No vaso-occlusive disease.    CT perfusion:  -26 mL of tissue with elevated time to maximum in the left superior   medial frontal lobe, likely representing chronic posttreatment changes.    Consider MRI if clinical concern for acute infarct or new metastases   persists.        < end of copied text >  < from: CT Brain Stroke Protocol (22 @ 07:51) >  IMPRESSION:  Interval decompressive left frontotemporal craniectomy with decreased   mass effect and resolved rightward midline shift secondary to significant   left frontal vasogenic edema that is discussed above. No acute   intracranial hemorrhage, extra-axial collection or new suspicious region   of vasogenic edema. Additional findings as above.    < end of copied text >            < from: MR Head w/wo IV Cont (22 @ 11:28) >  IMPRESSION: New postop changes are identified with a large area of   abnormal enhancement seen involving the left frontal cortical subcortical   region with increased surrounding edema mass effect on left lateral   ventricle left-to-right shift. This finding is worrisome for progression   of patient's underlying disease process though the possibility of   treatment-related changes must be considered. Clinical correlation and   continued close interval is recommended. MRI perfusion can also be done   for further evaluation.    Previously noted lesion involving posterior fossa and supratentorial   region are less conspicuous which is compatible with response to therapy.    --- End of Report ---      < end of copied text >        Abnormal EEG study.  Left hemispheric structural abnormality  No epileptiform pattern or seizure seen.  Skull defect max in the left frontocentral and central temporal regions

## 2022-02-04 NOTE — CONSULT NOTE ADULT - PROBLEM SELECTOR RECOMMENDATION 2
- ERCP is indicated but the timing for procedure is unclear  - will discuss with Neurology regarding risk for general anesthesia post CVA    - LFTs are improving and ERCP is not emergent  - will discuss with family

## 2022-02-04 NOTE — PROGRESS NOTE ADULT - SUBJECTIVE AND OBJECTIVE BOX
Patient is a 47y old  Female who presents with a chief complaint of CVA (04 Feb 2022 10:05)      OVERNIGHT EVENTS:    REVIEW OF SYSTEMS: denies chest pain/SOB, diaphoresis, no F/C, cough, dizziness, headache, blurry vision, nausea, vomiting, abdominal pain. All others review of systems negative     MEDICATIONS  (STANDING):  aspirin  chewable 81 milliGRAM(s) Enteral Tube daily  dexAMETHasone  Injectable 4 milliGRAM(s) IV Push two times a day  dextrose 40% Gel 15 Gram(s) Oral once  dextrose 50% Injectable 25 Gram(s) IV Push once  dextrose 50% Injectable 12.5 Gram(s) IV Push once  dextrose 50% Injectable 25 Gram(s) IV Push once  glucagon  Injectable 1 milliGRAM(s) IntraMuscular once  heparin   Injectable 5000 Unit(s) SubCutaneous every 8 hours  metoprolol tartrate Injectable 5 milliGRAM(s) IV Push every 6 hours  montelukast  Chewable 5 milliGRAM(s) Oral daily  sodium chloride 0.9%. 1000 milliLiter(s) (100 mL/Hr) IV Continuous <Continuous>  valproic acid 250 milliGRAM(s) Oral two times a day    MEDICATIONS  (PRN):  melatonin 3 milliGRAM(s) Oral at bedtime PRN Insomnia  prochlorperazine   Tablet 10 milliGRAM(s) Oral every 6 hours PRN nausea, vomiting      Allergies    codeine (Other)  latex (Rash)    Intolerances        T(F): 98.5 (02-04-22 @ 15:43), Max: 98.5 (02-04-22 @ 15:43)  HR: 129 (02-04-22 @ 15:43) (65 - 148)  BP: 86/65 (02-04-22 @ 15:43) (86/65 - 136/80)  RR: 17 (02-04-22 @ 15:43) (17 - 19)  SpO2: 97% (02-04-22 @ 15:43) (95% - 99%)  Wt(kg): --    PHYSICAL EXAM:  GENERAL: NAD  HEAD:  Atraumatic, Normocephalic  EYES: PERRLA, conjunctiva and sclera clear  ENMT: Moist mucous membranes  NECK: Supple, No JVD, Normal thyroid  NERVOUS SYSTEM:  Alert & Awake  CHEST/LUNG: Clear to percussion bilaterally;   HEART: Regular rate and rhythm;   ABDOMEN: Soft, Nontender, Nondistended; Bowel sounds present  EXTREMITIES:  no edema BL LE  SKIN: moist    LABS:                        12.2   6.05  )-----------( 182      ( 04 Feb 2022 10:14 )             37.2     02-04    138  |  105  |  12  ----------------------------<  105<H>  3.7   |  24  |  0.76    Ca    9.4      04 Feb 2022 10:14  Phos  3.8     02-03  Mg     2.9     02-03    TPro  8.0  /  Alb  2.9<L>  /  TBili  3.1<H>  /  DBili  x   /  AST  116<H>  /  ALT  471<H>  /  AlkPhos  296<H>  02-04        Cultures;   CAPILLARY BLOOD GLUCOSE      POCT Blood Glucose.: 108 mg/dL (04 Feb 2022 10:56)  POCT Blood Glucose.: 101 mg/dL (04 Feb 2022 07:45)  POCT Blood Glucose.: 114 mg/dL (04 Feb 2022 05:40)  POCT Blood Glucose.: 128 mg/dL (04 Feb 2022 01:46)    Lipid panel:   LDL --  TG 45          RADIOLOGY & ADDITIONAL TESTS:    Imaging Personally Reviewed:  [x ] YES      Consultant(s) Notes Reviewed:  [x ] YES     Care Discussed with [x ] Consultants [X ] Patient [ ] Family  [x ]    [x ]  Other; RN

## 2022-02-04 NOTE — PROGRESS NOTE ADULT - SUBJECTIVE AND OBJECTIVE BOX
lying in bed	    Vital Signs Last 24 Hrs  T(C): 36.9 (2022 04:59), Max: 37.1 (2022 16:01)  T(F): 98.4 (2022 04:59), Max: 98.7 (2022 16:01)  HR: 148 (2022 09:37) (78 - 148)  BP: 136/80 (2022 09:37) (109/77 - 136/80)  BP(mean): --  RR: 18 (2022 04:59) (18 - 19)  SpO2: 99% (2022 04:59) (98% - 100%)    PHYSICAL EXAM:    general - weak looking +  HEENT - No Icterus  CVS - RRR  RS - AE B/L  Abd - soft, NT  Ext - Pulses +        LABS:                        12.1   2.04  )-----------( 202      ( 2022 07:05 )             38.0     02-03    139  |  108  |  16  ----------------------------<  90  4.0   |  23  |  0.85    Ca    9.6      2022 07:05  Phos  3.8     02-03  Mg     2.9     02-03    TPro  7.9  /  Alb  3.1<L>  /  TBili  2.9<H>  /  DBili  2.1<H>  /  AST  293<H>  /  ALT  762<H>  /  AlkPhos  308<H>  02-03      Urinalysis Basic - ( 2022 14:19 )    Color: Yellow / Appearance: Slightly Turbid / S.005 / pH: x  Gluc: x / Ketone: Negative  / Bili: Small / Urobili: 4 mg/dL   Blood: x / Protein: 30 mg/dL / Nitrite: Negative   Leuk Esterase: Trace / RBC: 0-2 /HPF / WBC 3-5   Sq Epi: x / Non Sq Epi: x / Bacteria: Occasional        Culture - Urine (collected 2022 18:54)  Source: Clean Catch Clean Catch (Midstream)  Preliminary Report (2022 09:55):    >100,000 CFU/ml Gram Negative Rods

## 2022-02-05 LAB
-  AMIKACIN: SIGNIFICANT CHANGE UP
-  AMOXICILLIN/CLAVULANIC ACID: SIGNIFICANT CHANGE UP
-  AMPICILLIN/SULBACTAM: SIGNIFICANT CHANGE UP
-  AMPICILLIN: SIGNIFICANT CHANGE UP
-  AZTREONAM: SIGNIFICANT CHANGE UP
-  CEFAZOLIN: SIGNIFICANT CHANGE UP
-  CEFEPIME: SIGNIFICANT CHANGE UP
-  CEFOXITIN: SIGNIFICANT CHANGE UP
-  CEFTRIAXONE: SIGNIFICANT CHANGE UP
-  CIPROFLOXACIN: SIGNIFICANT CHANGE UP
-  ERTAPENEM: SIGNIFICANT CHANGE UP
-  GENTAMICIN: SIGNIFICANT CHANGE UP
-  LEVOFLOXACIN: SIGNIFICANT CHANGE UP
-  MEROPENEM: SIGNIFICANT CHANGE UP
-  NITROFURANTOIN: SIGNIFICANT CHANGE UP
-  PIPERACILLIN/TAZOBACTAM: SIGNIFICANT CHANGE UP
-  TOBRAMYCIN: SIGNIFICANT CHANGE UP
-  TRIMETHOPRIM/SULFAMETHOXAZOLE: SIGNIFICANT CHANGE UP
ALBUMIN SERPL ELPH-MCNC: 2.3 G/DL — LOW (ref 3.3–5)
ALP SERPL-CCNC: 356 U/L — HIGH (ref 40–120)
ALT FLD-CCNC: 274 U/L — HIGH (ref 12–78)
ANION GAP SERPL CALC-SCNC: 11 MMOL/L — SIGNIFICANT CHANGE UP (ref 5–17)
AST SERPL-CCNC: 54 U/L — HIGH (ref 15–37)
BILIRUB SERPL-MCNC: 5.6 MG/DL — HIGH (ref 0.2–1.2)
BUN SERPL-MCNC: 19 MG/DL — SIGNIFICANT CHANGE UP (ref 7–23)
CALCIUM SERPL-MCNC: 8.8 MG/DL — SIGNIFICANT CHANGE UP (ref 8.5–10.1)
CHLORIDE SERPL-SCNC: 105 MMOL/L — SIGNIFICANT CHANGE UP (ref 96–108)
CO2 SERPL-SCNC: 20 MMOL/L — LOW (ref 22–31)
CREAT SERPL-MCNC: 0.84 MG/DL — SIGNIFICANT CHANGE UP (ref 0.5–1.3)
CULTURE RESULTS: SIGNIFICANT CHANGE UP
FERRITIN SERPL-MCNC: 930 NG/ML — HIGH (ref 15–150)
GLUCOSE BLDC GLUCOMTR-MCNC: 111 MG/DL — HIGH (ref 70–99)
GLUCOSE SERPL-MCNC: 121 MG/DL — HIGH (ref 70–99)
METHOD TYPE: SIGNIFICANT CHANGE UP
ORGANISM # SPEC MICROSCOPIC CNT: SIGNIFICANT CHANGE UP
ORGANISM # SPEC MICROSCOPIC CNT: SIGNIFICANT CHANGE UP
POTASSIUM SERPL-MCNC: 3.8 MMOL/L — SIGNIFICANT CHANGE UP (ref 3.5–5.3)
POTASSIUM SERPL-SCNC: 3.8 MMOL/L — SIGNIFICANT CHANGE UP (ref 3.5–5.3)
PROT SERPL-MCNC: 6.9 GM/DL — SIGNIFICANT CHANGE UP (ref 6–8.3)
SODIUM SERPL-SCNC: 136 MMOL/L — SIGNIFICANT CHANGE UP (ref 135–145)
SPECIMEN SOURCE: SIGNIFICANT CHANGE UP

## 2022-02-05 PROCEDURE — 99233 SBSQ HOSP IP/OBS HIGH 50: CPT

## 2022-02-05 RX ORDER — METOPROLOL TARTRATE 50 MG
5 TABLET ORAL EVERY 6 HOURS
Refills: 0 | Status: DISCONTINUED | OUTPATIENT
Start: 2022-02-05 | End: 2022-02-28

## 2022-02-05 RX ADMIN — Medication 250 MILLIGRAM(S): at 17:08

## 2022-02-05 RX ADMIN — SODIUM CHLORIDE 100 MILLILITER(S): 9 INJECTION INTRAMUSCULAR; INTRAVENOUS; SUBCUTANEOUS at 23:51

## 2022-02-05 RX ADMIN — HEPARIN SODIUM 5000 UNIT(S): 5000 INJECTION INTRAVENOUS; SUBCUTANEOUS at 05:06

## 2022-02-05 RX ADMIN — Medication 5 MILLIGRAM(S): at 23:51

## 2022-02-05 RX ADMIN — SODIUM CHLORIDE 100 MILLILITER(S): 9 INJECTION INTRAMUSCULAR; INTRAVENOUS; SUBCUTANEOUS at 05:07

## 2022-02-05 RX ADMIN — MONTELUKAST 5 MILLIGRAM(S): 4 TABLET, CHEWABLE ORAL at 11:19

## 2022-02-05 RX ADMIN — Medication 5 MILLIGRAM(S): at 14:40

## 2022-02-05 RX ADMIN — Medication 250 MILLIGRAM(S): at 05:06

## 2022-02-05 RX ADMIN — Medication 4 MILLIGRAM(S): at 17:08

## 2022-02-05 RX ADMIN — Medication 81 MILLIGRAM(S): at 11:19

## 2022-02-05 RX ADMIN — HEPARIN SODIUM 5000 UNIT(S): 5000 INJECTION INTRAVENOUS; SUBCUTANEOUS at 21:25

## 2022-02-05 RX ADMIN — Medication 4 MILLIGRAM(S): at 05:06

## 2022-02-05 RX ADMIN — HEPARIN SODIUM 5000 UNIT(S): 5000 INJECTION INTRAVENOUS; SUBCUTANEOUS at 14:40

## 2022-02-05 RX ADMIN — SODIUM CHLORIDE 100 MILLILITER(S): 9 INJECTION INTRAMUSCULAR; INTRAVENOUS; SUBCUTANEOUS at 11:20

## 2022-02-05 RX ADMIN — Medication 5 MILLIGRAM(S): at 18:26

## 2022-02-05 RX ADMIN — Medication 5 MILLIGRAM(S): at 00:04

## 2022-02-05 NOTE — PROGRESS NOTE ADULT - PROBLEM SELECTOR PLAN 1
- ERCP is indicated but the timing for procedure is unclear  - will discuss with Neurology regarding risk for general anesthesia post CVA    - LFTs are improving and ERCP is not emergent would hold at this time until fu MRI for brain on decadron and anticoagulation therapy is settled  - will discuss with family (left message for spouse)

## 2022-02-05 NOTE — PROGRESS NOTE ADULT - PROBLEM SELECTOR PLAN 1
- continue Decadron, consider repeat MRI of Brain in few days  - GI and Neurology F/u  - Physical Therapy when able

## 2022-02-05 NOTE — PROGRESS NOTE ADULT - ASSESSMENT
48 yo F hx stage 4 NSCLC s/p XRT and chemotherapy at [facility], s/p L craniotomy and resection of mass in 2021, presenting with slurred speech. On PE advanced psychomotor slowing poor attention RUE drift. CTH s/p le crani with l frontal vasogenic edema CTA no acute findings. MRI concerning for progression of disease vs post-op changes with surrounding edema    Impression:  mass lesion    MRI brain with and w/o con reviewed,  Unclear utility of repeat MRI ordered by team, does not have to stay for it. if a repeat MRI is pursued please request with Spect   Routine EEG with left sided dysfunction, no epileptogenic discharge  Continue with  Decadron 4mg BID, to be titrated as an outpt  on  mg BID, consider checking level  No need for Aspirin from a stroke perspective at this time  F/u with her outpt Oncologist, MAKENZIE to clarify if any further intervention or w/u  Can follow up with Neurology, Dr. Donaldo Polk at [phone]

## 2022-02-05 NOTE — PROGRESS NOTE ADULT - SUBJECTIVE AND OBJECTIVE BOX
Gastroenterology  Patient seen and examined bedside resting comfortably.  No complaints offered.   No abdominal pain  Denies nausea and vomiting. Tolerating diet.  Normal flatus/BM.     T(F): 98.2 (02-05-22 @ 10:41), Max: 98.5 (02-04-22 @ 15:43)  HR: 127 (02-05-22 @ 13:08) (84 - 137)  BP: 121/82 (02-05-22 @ 13:08) (86/65 - 123/83)  RR: 18 (02-05-22 @ 05:29) (17 - 20)  SpO2: 99% (02-05-22 @ 10:41) (95% - 100%)  Wt(kg): --  CAPILLARY BLOOD GLUCOSE      POCT Blood Glucose.: 111 mg/dL (05 Feb 2022 06:36)  POCT Blood Glucose.: 131 mg/dL (04 Feb 2022 22:07)  POCT Blood Glucose.: 157 mg/dL (04 Feb 2022 16:46)      PHYSICAL EXAM:  General: NAD, WDWN.   Neuro:  Alert & responsive  HEENT: NCAT, EOMI, conjunctiva clear  CV: +S1+S2 regular rate and rhythm  Lung: clear to ausculation bilaterally, respirations nonlabored, good inspiratory effort  Abdomen: soft, Non Tender, No distention Normal active BS  Extremities: no cyanosis, clubbing or edema    LABS:                        12.2   6.05  )-----------( 182      ( 04 Feb 2022 10:14 )             37.2     02-05    136  |  105  |  19  ----------------------------<  121<H>  3.8   |  20<L>  |  0.84    Ca    8.8      05 Feb 2022 09:02    TPro  6.9  /  Alb  2.3<L>  /  TBili  5.6<H>  /  DBili  x   /  AST  54<H>  /  ALT  274<H>  /  AlkPhos  356<H>  02-05    LIVER FUNCTIONS - ( 05 Feb 2022 09:02 )  Alb: 2.3 g/dL / Pro: 6.9 gm/dL / ALK PHOS: 356 U/L / ALT: 274 U/L / AST: 54 U/L / GGT: x             I&O's Detail    04 Feb 2022 07:01  -  05 Feb 2022 07:00  --------------------------------------------------------  IN:    Oral Fluid: 490 mL    sodium chloride 0.9%: 1200 mL  Total IN: 1690 mL    OUT:    Voided (mL): 200 mL  Total OUT: 200 mL    Total NET: 1490 mL        02-05 @ 09:02    136 | 105 | 19  /8.8 | -- | --  _______________________/  3.8 | 20 | 0.84                           \par

## 2022-02-05 NOTE — PROGRESS NOTE ADULT - SUBJECTIVE AND OBJECTIVE BOX
Patient is a 47y old  Female who presents with a chief complaint of CVA (05 Feb 2022 13:30)      OVERNIGHT EVENTS:    REVIEW OF SYSTEMS: denies chest pain/SOB, diaphoresis, no F/C, cough, dizziness, headache, blurry vision, nausea, vomiting, abdominal pain. All others review of systems negative     MEDICATIONS  (STANDING):  aspirin  chewable 81 milliGRAM(s) Enteral Tube daily  dexAMETHasone  Injectable 4 milliGRAM(s) IV Push two times a day  dextrose 40% Gel 15 Gram(s) Oral once  dextrose 50% Injectable 25 Gram(s) IV Push once  dextrose 50% Injectable 12.5 Gram(s) IV Push once  dextrose 50% Injectable 25 Gram(s) IV Push once  glucagon  Injectable 1 milliGRAM(s) IntraMuscular once  heparin   Injectable 5000 Unit(s) SubCutaneous every 8 hours  metoprolol tartrate Injectable 5 milliGRAM(s) IV Push every 6 hours  montelukast  Chewable 5 milliGRAM(s) Oral daily  sodium chloride 0.9%. 1000 milliLiter(s) (100 mL/Hr) IV Continuous <Continuous>  valproic acid 250 milliGRAM(s) Oral two times a day    MEDICATIONS  (PRN):  melatonin 3 milliGRAM(s) Oral at bedtime PRN Insomnia  prochlorperazine   Tablet 10 milliGRAM(s) Oral every 6 hours PRN nausea, vomiting      Allergies    codeine (Other)  latex (Rash)    Intolerances        T(F): 98.6 (02-05-22 @ 16:30), Max: 98.6 (02-05-22 @ 16:30)  HR: 126 (02-05-22 @ 16:30) (84 - 137)  BP: 112/76 (02-05-22 @ 16:30) (95/68 - 123/83)  RR: 18 (02-05-22 @ 16:30) (18 - 20)  SpO2: 99% (02-05-22 @ 16:30) (95% - 100%)  Wt(kg): --    PHYSICAL EXAM:  GENERAL: NAD  HEAD:  Atraumatic, Normocephalic  EYES: PERRLA, conjunctiva and sclera clear  ENMT: Moist mucous membranes  NECK: Supple, No JVD, Normal thyroid  NERVOUS SYSTEM:  Alert & Awake  CHEST/LUNG: Clear to percussion bilaterally;   HEART: Regular rate and rhythm;   ABDOMEN: Soft, Nontender, Nondistended; Bowel sounds present  EXTREMITIES:  no edema BL LE  SKIN: moist    LABS:                        12.2   6.05  )-----------( 182      ( 04 Feb 2022 10:14 )             37.2     02-05    136  |  105  |  19  ----------------------------<  121<H>  3.8   |  20<L>  |  0.84    Ca    8.8      05 Feb 2022 09:02    TPro  6.9  /  Alb  2.3<L>  /  TBili  5.6<H>  /  DBili  x   /  AST  54<H>  /  ALT  274<H>  /  AlkPhos  356<H>  02-05        Cultures;   CAPILLARY BLOOD GLUCOSE      POCT Blood Glucose.: 111 mg/dL (05 Feb 2022 06:36)  POCT Blood Glucose.: 131 mg/dL (04 Feb 2022 22:07)    Lipid panel:   LDL --  TG 45          RADIOLOGY & ADDITIONAL TESTS:    Imaging Personally Reviewed:  [x ] YES      Consultant(s) Notes Reviewed:  [x ] YES     Care Discussed with [x ] Consultants [X ] Patient [ ] Family  [x ]    [x ]  Other; RN

## 2022-02-05 NOTE — PROGRESS NOTE ADULT - SUBJECTIVE AND OBJECTIVE BOX
Neurology consult    VENANCIO [id number]     Patient is a 47y old  Female who presents with a chief complaint of     HPI: 46 yo F hx stage 4 NSCLC s/p XRT and chemotherapy at [facility], s/p L craniotomy and resection of mass in 2021, presenting with slurred speech reported by . Last known normal 4 am. Per , she was getting up to use the bathroom throughout the night, checked on her at 4 am, at which time her speech was normal. 6 am, noted to be slurring speech. Of note, went to event last night, noted to have episode of nb/nb emesis. No gait abnormality yesterday, no dizziness, no falls. Not on AC.    Patient seen and examined this am. No new events    MEDICATIONS:    aspirin  chewable 81 milliGRAM(s) Enteral Tube daily  dexAMETHasone  Injectable 4 milliGRAM(s) IV Push two times a day  dextrose 40% Gel 15 Gram(s) Oral once  dextrose 50% Injectable 25 Gram(s) IV Push once  dextrose 50% Injectable 12.5 Gram(s) IV Push once  dextrose 50% Injectable 25 Gram(s) IV Push once  glucagon  Injectable 1 milliGRAM(s) IntraMuscular once  heparin   Injectable 5000 Unit(s) SubCutaneous every 8 hours  melatonin 3 milliGRAM(s) Oral at bedtime PRN  metoprolol tartrate Injectable 5 milliGRAM(s) IV Push every 6 hours  montelukast  Chewable 5 milliGRAM(s) Oral daily  prochlorperazine   Tablet 10 milliGRAM(s) Oral every 6 hours PRN  sodium chloride 0.9%. 1000 milliLiter(s) IV Continuous <Continuous>  valproic acid 250 milliGRAM(s) Oral two times a day      LABS:                          12.2   6.05  )-----------( 182      ( 04 Feb 2022 10:14 )             37.2     02-05    136  |  105  |  19  ----------------------------<  121<H>  3.8   |  20<L>  |  0.84    Ca    8.8      05 Feb 2022 09:02    TPro  6.9  /  Alb  2.3<L>  /  TBili  5.6<H>  /  DBili  x   /  AST  54<H>  /  ALT  274<H>  /  AlkPhos  356<H>  02-05    CAPILLARY BLOOD GLUCOSE      POCT Blood Glucose.: 111 mg/dL (05 Feb 2022 06:36)  POCT Blood Glucose.: 131 mg/dL (04 Feb 2022 22:07)        I&O's Summary    04 Feb 2022 07:01  -  05 Feb 2022 07:00  --------------------------------------------------------  IN: 1690 mL / OUT: 200 mL / NET: 1490 mL      Vital Signs Last 24 Hrs  T(C): 37 (05 Feb 2022 16:30), Max: 37 (05 Feb 2022 16:30)  T(F): 98.6 (05 Feb 2022 16:30), Max: 98.6 (05 Feb 2022 16:30)  HR: 126 (05 Feb 2022 16:30) (84 - 137)  BP: 112/76 (05 Feb 2022 16:30) (95/68 - 123/83)  BP(mean): 88 (05 Feb 2022 16:30) (77 - 88)  RR: 18 (05 Feb 2022 16:30) (18 - 20)  SpO2: 99% (05 Feb 2022 16:30) (95% - 100%)    On Neurological Examination:    Mental Status - Patient is alert, awake, oriented to self and location. wrong month psycho-motor slowing smiles to command,     Cranial Nerves - PERRL, EOMI, VFF, V1-V3 intact, no gross facial asymmetry, tongue/uvula midline    Motor Exam -   moves all ext RUE no drift with slower movements less coordinated       nml bulk/tone    Sensory    Intact to light touch and pinprick bilaterally    Coord: no obvious dysmetria     Gait -  deferred  RADIOLOGY  < from: CT Perfusion w/ Maps w/ IV Cont (02.02.22 @ 08:03) >  IMPRESSION:  CT angiogram neck:  -No vaso-occlusive disease.    CT angiogram head:  -No vaso-occlusive disease.    CT perfusion:  -26 mL of tissue with elevated time to maximum in the left superior   medial frontal lobe, likely representing chronic posttreatment changes.    Consider MRI if clinical concern for acute infarct or new metastases   persists.        < end of copied text >  < from: CT Brain Stroke Protocol (02.02.22 @ 07:51) >  IMPRESSION:  Interval decompressive left frontotemporal craniectomy with decreased   mass effect and resolved rightward midline shift secondary to significant   left frontal vasogenic edema that is discussed above. No acute   intracranial hemorrhage, extra-axial collection or new suspicious region   of vasogenic edema. Additional findings as above.    < end of copied text >            < from: MR Head w/wo IV Cont (02.02.22 @ 11:28) >  IMPRESSION: New postop changes are identified with a large area of   abnormal enhancement seen involving the left frontal cortical subcortical   region with increased surrounding edema mass effect on left lateral   ventricle left-to-right shift. This finding is worrisome for progression   of patient's underlying disease process though the possibility of   treatment-related changes must be considered. Clinical correlation and   continued close interval is recommended. MRI perfusion can also be done   for further evaluation.    Previously noted lesion involving posterior fossa and supratentorial   region are less conspicuous which is compatible with response to therapy.    --- End of Report ---      < end of copied text >        Abnormal EEG study.  Left hemispheric structural abnormality  No epileptiform pattern or seizure seen.  Skull defect max in the left frontocentral and central temporal regions

## 2022-02-05 NOTE — PROGRESS NOTE ADULT - ASSESSMENT
44 years old female with h/o asthma, NSCLC with known brain metastases, S/P XRT and chemotherapy at [facility] lung cancer S/P left craniotomy and resection of mass in 09/2021 (Dr Parviz Paul) present to ED with slurry speech started at 6AM, last known normal was 4AM  Tachycardic to 120s ( per patient, baseline HR in 120), BP stable, afebrile sat well at RA. WBC 13.36, K 3.5, Cr 1.17, AST/ALT 1207/1231, lipase normal, CK 78. CT head with Interval decompressive left frontotemporal craniectomy with decreased mass effect and resolved rightward midline shift secondary to significant left frontal vasogenic edema. No acute intracranial hemorrhage, extra-axial collection or new suspicious region of vasogenic edema. CTA head/neck with no vaso-occlusive disease. CT perfusion-26 mL of tissue with elevated time to maximum in the left superior medial frontal lobe, likely representing chronic posttreatment changes. Not a candidate for TPA.     Admitted with suspected CVA    Problem/Plan - 1   ·  Problem: brain mass lesion w/surrounding edema   ·  Plan: present with slurry speech and slight right leg weakness  Not a candidate for TPA  CT head with Interval decompressive left frontotemporal craniectomy with decreased mass effect and resolved rightward midline shift secondary to significant left frontal vasogenic edema. No acute intracranial hemorrhage, extra-axial collection or new suspicious region of vasogenic edema. CTA head/neck with no vaso-occlusive disease. CT perfusion-26 mL of tissue with elevated time to maximum in the left superior medial frontal lobe, likely representing chronic posttreatment changes  Neurology consulted  Aspirin 81mg  No statin due to elevated LFT  MRI brain with and without contrast ;  New postop changes are identified with a large area of abnormal enhancement seen involving the left frontal cortical subcortical region with increased surrounding edema mass effect on left lateral ventricle left-to-right shift. This finding is worrisome for progression of patient's underlying disease process  EEG noted  Dexamethasone 10mg stat and 4mg bid  Neuro check  PT/OT/Speech eval passed; regular diet  rpt MRI     Problem/Plan - 2   ·  Problem: Elevated LFTs. Transaminitis with CBD stone   ·  Plan: AST/ALT 1207/1231  New abnormality. No history of ETOH, drug use, recent change in medications  Acute hepatitis panel   GI consulted- Dr Singh  monitor LFT.  plan for MRCP 2/3 preliminary CBD stone  Biliary consult/Dr. Herrera/Reiders; ERCP is not emergent     Problem/Plan - 3   ·  Problem: NSCLC metastatic to brain.   ·  Plan: NSCLC with known brain metastases, S/P XRT and chemotherapy at [facility] lung cancer S/P left craniotomy and resection of mass in 09/2021  On Tagrisso 80mg daily-  to bring home medication  Hematology/oncology consulted- Dr Sorenson.  Routine EEG with left sided dysfunction, no epileptogenic discharge  Continue with  Decadron 4mg BID for Brain edema  No need for Aspirin at this time    Problem/Plan - 4   ·  Problem: Leukocytosis.   ·  Plan: No sign of infection  Monitor CBC.    Problem/Plan - 5   ·  Problem: Mild intermittent asthma.   ·  Plan: not in exacerbation  albuterol prn.

## 2022-02-05 NOTE — PROGRESS NOTE ADULT - ASSESSMENT
44 years old female with h/o asthma, NSCLC with known brain metastases, S/P XRT and chemotherapy at [facility] lung cancer S/P left craniotomy and resection of mass in 09/2021   admitted with Acute CVA on IV heparin ( not a candidate for TPA)  asked to see patient for CBD stone with elevated LFTS and jaundice

## 2022-02-05 NOTE — PROGRESS NOTE ADULT - SUBJECTIVE AND OBJECTIVE BOX
lying in bed    Vital Signs Last 24 Hrs  T(C): 36.8 (05 Feb 2022 05:29), Max: 36.9 (04 Feb 2022 15:43)  T(F): 98.3 (05 Feb 2022 05:29), Max: 98.5 (04 Feb 2022 15:43)  HR: 134 (05 Feb 2022 09:52) (65 - 134)  BP: 95/68 (05 Feb 2022 09:52) (86/65 - 130/82)  BP(mean): --  RR: 18 (05 Feb 2022 05:29) (17 - 20)  SpO2: 95% (05 Feb 2022 05:29) (95% - 100%)    PHYSICAL EXAM:    general - Awkae, alert  HEENT - No Icterus  CVS - RRR  RS - AE B/L  Abd - soft, NT  Ext - Pulses +        LABS:                        12.2   6.05  )-----------( 182      ( 04 Feb 2022 10:14 )             37.2     02-05    136  |  105  |  19  ----------------------------<  121<H>  3.8   |  20<L>  |  0.84    Ca    8.8      05 Feb 2022 09:02    TPro  6.9  /  Alb  2.3<L>  /  TBili  5.6<H>  /  DBili  x   /  AST  54<H>  /  ALT  274<H>  /  AlkPhos  356<H>  02-05          Culture - Urine (collected 02 Feb 2022 18:54)  Source: Clean Catch Clean Catch (Midstream)  Preliminary Report (04 Feb 2022 22:39):    >100,000 CFU/ml Proteus mirabilis    <10,000 CFU/ml Normal Urogenital manish present

## 2022-02-06 LAB
ALBUMIN SERPL ELPH-MCNC: 1.9 G/DL — LOW (ref 3.3–5)
ALP SERPL-CCNC: 439 U/L — HIGH (ref 40–120)
ALT FLD-CCNC: 185 U/L — HIGH (ref 12–78)
ANION GAP SERPL CALC-SCNC: 10 MMOL/L — SIGNIFICANT CHANGE UP (ref 5–17)
APTT BLD: 36.8 SEC — HIGH (ref 27.5–35.5)
AST SERPL-CCNC: 43 U/L — HIGH (ref 15–37)
BILIRUB SERPL-MCNC: 7 MG/DL — HIGH (ref 0.2–1.2)
BLD GP AB SCN SERPL QL: SIGNIFICANT CHANGE UP
BUN SERPL-MCNC: 23 MG/DL — SIGNIFICANT CHANGE UP (ref 7–23)
CALCIUM SERPL-MCNC: 8.3 MG/DL — LOW (ref 8.5–10.1)
CHLORIDE SERPL-SCNC: 111 MMOL/L — HIGH (ref 96–108)
CLOSURE TME COLL+EPINEP BLD: 13 K/UL — CRITICAL LOW (ref 150–400)
CO2 SERPL-SCNC: 18 MMOL/L — LOW (ref 22–31)
CREAT SERPL-MCNC: 0.85 MG/DL — SIGNIFICANT CHANGE UP (ref 0.5–1.3)
FIBRINOGEN PPP-MCNC: 1026 MG/DL — HIGH (ref 290–520)
GLUCOSE SERPL-MCNC: 145 MG/DL — HIGH (ref 70–99)
HCT VFR BLD CALC: 27.6 % — LOW (ref 34.5–45)
HCT VFR BLD CALC: 31.8 % — LOW (ref 34.5–45)
HCT VFR BLD CALC: 32.1 % — LOW (ref 34.5–45)
HGB BLD-MCNC: 10.7 G/DL — LOW (ref 11.5–15.5)
HGB BLD-MCNC: 11 G/DL — LOW (ref 11.5–15.5)
HGB BLD-MCNC: 9.2 G/DL — LOW (ref 11.5–15.5)
INR BLD: 1.94 RATIO — HIGH (ref 0.88–1.16)
MCHC RBC-ENTMCNC: 29.3 PG — SIGNIFICANT CHANGE UP (ref 27–34)
MCHC RBC-ENTMCNC: 29.5 PG — SIGNIFICANT CHANGE UP (ref 27–34)
MCHC RBC-ENTMCNC: 29.5 PG — SIGNIFICANT CHANGE UP (ref 27–34)
MCHC RBC-ENTMCNC: 33.3 G/DL — SIGNIFICANT CHANGE UP (ref 32–36)
MCHC RBC-ENTMCNC: 33.6 G/DL — SIGNIFICANT CHANGE UP (ref 32–36)
MCHC RBC-ENTMCNC: 34.3 G/DL — SIGNIFICANT CHANGE UP (ref 32–36)
MCV RBC AUTO: 85.4 FL — SIGNIFICANT CHANGE UP (ref 80–100)
MCV RBC AUTO: 87.6 FL — SIGNIFICANT CHANGE UP (ref 80–100)
MCV RBC AUTO: 88.5 FL — SIGNIFICANT CHANGE UP (ref 80–100)
NRBC # BLD: 0 /100 WBCS — SIGNIFICANT CHANGE UP (ref 0–0)
PLATELET # BLD AUTO: 17 K/UL — CRITICAL LOW (ref 150–400)
PLATELET # BLD AUTO: 19 K/UL — CRITICAL LOW (ref 150–400)
PLATELET # BLD AUTO: 48 K/UL — LOW (ref 150–400)
POTASSIUM SERPL-MCNC: 4 MMOL/L — SIGNIFICANT CHANGE UP (ref 3.5–5.3)
POTASSIUM SERPL-SCNC: 4 MMOL/L — SIGNIFICANT CHANGE UP (ref 3.5–5.3)
PROT SERPL-MCNC: 6.5 GM/DL — SIGNIFICANT CHANGE UP (ref 6–8.3)
PROTHROM AB SERPL-ACNC: 21.8 SEC — HIGH (ref 10.6–13.6)
RBC # BLD: 3.12 M/UL — LOW (ref 3.8–5.2)
RBC # BLD: 3.63 M/UL — LOW (ref 3.8–5.2)
RBC # BLD: 3.76 M/UL — LOW (ref 3.8–5.2)
RBC # FLD: 16.5 % — HIGH (ref 10.3–14.5)
RBC # FLD: 16.5 % — HIGH (ref 10.3–14.5)
RBC # FLD: 16.9 % — HIGH (ref 10.3–14.5)
SODIUM SERPL-SCNC: 139 MMOL/L — SIGNIFICANT CHANGE UP (ref 135–145)
WBC # BLD: 24.12 K/UL — HIGH (ref 3.8–10.5)
WBC # BLD: 25.27 K/UL — HIGH (ref 3.8–10.5)
WBC # BLD: 27.59 K/UL — HIGH (ref 3.8–10.5)
WBC # FLD AUTO: 24.12 K/UL — HIGH (ref 3.8–10.5)
WBC # FLD AUTO: 25.27 K/UL — HIGH (ref 3.8–10.5)
WBC # FLD AUTO: 27.59 K/UL — HIGH (ref 3.8–10.5)

## 2022-02-06 PROCEDURE — 99233 SBSQ HOSP IP/OBS HIGH 50: CPT

## 2022-02-06 PROCEDURE — 99223 1ST HOSP IP/OBS HIGH 75: CPT

## 2022-02-06 PROCEDURE — 70450 CT HEAD/BRAIN W/O DYE: CPT | Mod: 26

## 2022-02-06 RX ORDER — PIPERACILLIN AND TAZOBACTAM 4; .5 G/20ML; G/20ML
3.38 INJECTION, POWDER, LYOPHILIZED, FOR SOLUTION INTRAVENOUS ONCE
Refills: 0 | Status: COMPLETED | OUTPATIENT
Start: 2022-02-06 | End: 2022-02-06

## 2022-02-06 RX ORDER — PIPERACILLIN AND TAZOBACTAM 4; .5 G/20ML; G/20ML
3.38 INJECTION, POWDER, LYOPHILIZED, FOR SOLUTION INTRAVENOUS ONCE
Refills: 0 | Status: DISCONTINUED | OUTPATIENT
Start: 2022-02-06 | End: 2022-02-06

## 2022-02-06 RX ORDER — PIPERACILLIN AND TAZOBACTAM 4; .5 G/20ML; G/20ML
3.38 INJECTION, POWDER, LYOPHILIZED, FOR SOLUTION INTRAVENOUS EVERY 8 HOURS
Refills: 0 | Status: DISCONTINUED | OUTPATIENT
Start: 2022-02-06 | End: 2022-02-07

## 2022-02-06 RX ADMIN — Medication 5 MILLIGRAM(S): at 05:18

## 2022-02-06 RX ADMIN — SODIUM CHLORIDE 100 MILLILITER(S): 9 INJECTION INTRAMUSCULAR; INTRAVENOUS; SUBCUTANEOUS at 11:13

## 2022-02-06 RX ADMIN — Medication 250 MILLIGRAM(S): at 05:18

## 2022-02-06 RX ADMIN — Medication 4 MILLIGRAM(S): at 05:18

## 2022-02-06 RX ADMIN — PIPERACILLIN AND TAZOBACTAM 200 GRAM(S): 4; .5 INJECTION, POWDER, LYOPHILIZED, FOR SOLUTION INTRAVENOUS at 15:22

## 2022-02-06 RX ADMIN — HEPARIN SODIUM 5000 UNIT(S): 5000 INJECTION INTRAVENOUS; SUBCUTANEOUS at 05:18

## 2022-02-06 RX ADMIN — Medication 250 MILLIGRAM(S): at 18:44

## 2022-02-06 RX ADMIN — PIPERACILLIN AND TAZOBACTAM 25 GRAM(S): 4; .5 INJECTION, POWDER, LYOPHILIZED, FOR SOLUTION INTRAVENOUS at 22:35

## 2022-02-06 RX ADMIN — Medication 5 MILLIGRAM(S): at 11:14

## 2022-02-06 RX ADMIN — Medication 81 MILLIGRAM(S): at 11:14

## 2022-02-06 RX ADMIN — Medication 4 MILLIGRAM(S): at 18:43

## 2022-02-06 RX ADMIN — SODIUM CHLORIDE 100 MILLILITER(S): 9 INJECTION INTRAMUSCULAR; INTRAVENOUS; SUBCUTANEOUS at 22:36

## 2022-02-06 RX ADMIN — MONTELUKAST 5 MILLIGRAM(S): 4 TABLET, CHEWABLE ORAL at 11:14

## 2022-02-06 NOTE — PROGRESS NOTE ADULT - SUBJECTIVE AND OBJECTIVE BOX
Gastroenterology  Patient seen and examined bedside resting comfortably.  No complaints offered.   No abdominal pain  Denies nausea and vomiting. Tolerating diet.  Normal flatus/BM.     T(F): 98.7 (02-06-22 @ 05:18), Max: 99.6 (02-06-22 @ 02:03)  HR: 110 (02-06-22 @ 06:11) (110 - 137)  BP: 125/62 (02-06-22 @ 05:18) (95/68 - 125/62)  RR: 19 (02-06-22 @ 05:18) (18 - 20)  SpO2: 99% (02-06-22 @ 05:18) (99% - 100%)  Wt(kg): --  CAPILLARY BLOOD GLUCOSE          PHYSICAL EXAM:  General: NAD, WDWN.   Neuro:  Alert & responsive  HEENT: NCAT, EOMI, conjunctiva clear  CV: +S1+S2 regular rate and rhythm  Lung: clear to ausculation bilaterally, respirations nonlabored, good inspiratory effort  Abdomen: soft, Non Tender, No distention Normal active BS  Extremities: no cyanosis, clubbing or edema    LABS:    02-06    139  |  111<H>  |  23  ----------------------------<  145<H>  4.0   |  18<L>  |  0.85    Ca    8.3<L>      06 Feb 2022 07:25    TPro  6.5  /  Alb  1.9<L>  /  TBili  7.0<H>  /  DBili  x   /  AST  43<H>  /  ALT  185<H>  /  AlkPhos  439<H>  02-06    LIVER FUNCTIONS - ( 06 Feb 2022 07:25 )  Alb: 1.9 g/dL / Pro: 6.5 gm/dL / ALK PHOS: 439 U/L / ALT: 185 U/L / AST: 43 U/L / GGT: x             I&O's Detail    05 Feb 2022 07:01  -  06 Feb 2022 07:00  --------------------------------------------------------  IN:    sodium chloride 0.9%: 1200 mL  Total IN: 1200 mL    OUT:  Total OUT: 0 mL    Total NET: 1200 mL        02-06 @ 07:25    139 | 111 | 23  /8.3 | -- | --  _______________________/  4.0 | 18 | 0.85                           \par

## 2022-02-06 NOTE — CONSULT NOTE ADULT - ATTENDING COMMENTS
pt seen and examined on medical floor with ICU NP, daughter at bedside,  provided history over phone    47F PMH stage 4 NSCLC s/p chemo and XRT at [facility] with metastatic disease to brain s/p left craniotomy for mass resection in 9/2021 with Dr. Paul at [facility] with a complicated course due to surgical site / bone infection s/p 6 weeks of cefepime and vanco, awaiting cranioplasty (is supposed to wear a helmet) presents to [facility] for acute on set of slurred speech with R sided hemiparesis on 2/2/22. CT head with decompressive left frontotemporal craniectomy with decreased mass effect and resolved rightward midline shift. No acute intracranial hemorrhage, extra-axial collection or new suspicious region of vasogenic edema. CTA head/neck with no vaso-occlusive disease. Admitted to the medical floor. Neuro work up with EEG negative for seizures. Placed on decadron for cerebral edema. Pt also with + UTI on admission with urine cx growing proteus, 20% bandemia on admission, transaminitis, found to have hyperbilirubinemia with work up showing dilated common biliary duct and intrahepatic duct, with 6 mm biliary duct stone on MRCP. Evaluated by GI and will need eventual ERCP for choledocholithiasis. Pt had fever 102 on day of admission and afebrile since. Course with leukocytosis and thrombocytopenia. Reported by medical team to have worsening mental status and lethargy. Platelet count 19.    platelet count 290->202->182->17    Culture - Urine (02.02.22 @ 18:54)    Specimen Source: Clean Catch Clean Catch (Midstream)    Culture Results: >100,000 CFU/ml Proteus mirabilis    On exam: pt has eyes closed, arousable, answers simple questions  daughter at bedside states that she was just speaking to patient and they had an entire conversation. also states pt just took a shake. daughter does admit that pt is usually more alert than now but pt still responsive to family and having conversation with family.    ASSESSMENT:  47F with metastatic lung ca (NSCLC) with mets to brain s/p chemo/radiation/neurosurgical resection-craniotomy here with slurred speech, right hemiparesis. Also noted to have proteus UTI on admission, with course also with transaminitis and hyperbilirubinemia due to choledocholithiasis.  Course complicated by thrombocytopenia and increased lethargy per primary team.     NEURO  would recommend stat CT head to evaluate for potential ICH with thrombocytopenia (prelim CT read negative for bleed)  suspect pt with possible progression of underlying disease in the brain vs edema causing symptoms  awaiting MRI of brain  cont with decadron for cerebral edema  if MRI with acute findings, may need neurosx eval  neurology follow up  ?underlying encephalopathy due to brewing infection     HEME  thrombocytopenia  would check HIT panel as pt has been on heparin  DDX of thrombocytopenia: DIC, TTP, ITP, sepsis induced, medication induced  -check coags , fibrin split products  -less likely TTP with no reported fevers, anemia, renal involvement, difficult to say pt's neuro symptoms related to this vs edema or possible disease progression. with ttp as well there should be a hemolytic anemia component. can have lab check for schistocytes   -no bruising noted on her exam, no bleeding noted or seen, if ITP pt already on steroids  -possible thrombocytopenia is sepsis induced- she had proteus UTI (not treated) on admission along with bandemia. now with leukocytosis which my be due to underlying infection +/- steroid effects. there is also this choledocholithiasis which is pending ERCP which can also be or become source of infection. would do an infectious work up for pt. ?question to empirically start antibx with gram negative coverage as there is a potential for a /GI/GB source  -would transfuse platelets  -?possibility of her chemo medication tagrisso causing thrombocytopenia as a side effect of medication    at present time she is hemodynamically stable, protecting airway  no need for ICU

## 2022-02-06 NOTE — PROGRESS NOTE ADULT - ASSESSMENT
44 years old female with h/o asthma, NSCLC with known brain metastases, S/P XRT and chemotherapy at [facility] lung cancer S/P left craniotomy and resection of mass in 09/2021   admitted with Acute CVA on IV heparin ( not a candidate for TPA)  asked to see patient for CBD stone with elevated LFTS and jaundic

## 2022-02-06 NOTE — PROGRESS NOTE ADULT - SUBJECTIVE AND OBJECTIVE BOX
Patient is a 47y old  Female who presents with a chief complaint of CVA (06 Feb 2022 10:37)      OVERNIGHT EVENTS:    REVIEW OF SYSTEMS: denies chest pain/SOB, diaphoresis, no F/C, cough, dizziness, headache, blurry vision, nausea, vomiting, abdominal pain. All others review of systems negative     MEDICATIONS  (STANDING):  dexAMETHasone  Injectable 4 milliGRAM(s) IV Push two times a day  dextrose 40% Gel 15 Gram(s) Oral once  dextrose 50% Injectable 25 Gram(s) IV Push once  dextrose 50% Injectable 25 Gram(s) IV Push once  dextrose 50% Injectable 12.5 Gram(s) IV Push once  glucagon  Injectable 1 milliGRAM(s) IntraMuscular once  metoprolol tartrate Injectable 5 milliGRAM(s) IV Push every 6 hours  montelukast  Chewable 5 milliGRAM(s) Oral daily  sodium chloride 0.9%. 1000 milliLiter(s) (100 mL/Hr) IV Continuous <Continuous>  valproic acid 250 milliGRAM(s) Oral two times a day    MEDICATIONS  (PRN):  melatonin 3 milliGRAM(s) Oral at bedtime PRN Insomnia  prochlorperazine   Tablet 10 milliGRAM(s) Oral every 6 hours PRN nausea, vomiting      Allergies    codeine (Other)  latex (Rash)    Intolerances        T(F): 98.7 (02-06-22 @ 05:18), Max: 99.6 (02-06-22 @ 02:03)  HR: 128 (02-06-22 @ 10:57) (110 - 132)  BP: 114/74 (02-06-22 @ 10:57) (112/65 - 125/62)  RR: 18 (02-06-22 @ 10:57) (18 - 20)  SpO2: 99% (02-06-22 @ 10:57) (99% - 100%)  Wt(kg): --    PHYSICAL EXAM:  GENERAL: NAD  HEAD:  Atraumatic, Normocephalic  EYES: PERRLA, conjunctiva and sclera clear  ENMT: Moist mucous membranes  NECK: Supple, No JVD, Normal thyroid  NERVOUS SYSTEM:  Alert & Awake  CHEST/LUNG: Clear to percussion bilaterally;   HEART: Regular rate and rhythm;   ABDOMEN: Soft, Nontender, Nondistended; Bowel sounds present  EXTREMITIES:  no edema BL LE  SKIN: moist    LABS:                        11.0   25.27 )-----------( 19       ( 06 Feb 2022 12:37 )             32.1     02-06    139  |  111<H>  |  23  ----------------------------<  145<H>  4.0   |  18<L>  |  0.85    Ca    8.3<L>      06 Feb 2022 07:25    TPro  6.5  /  Alb  1.9<L>  /  TBili  7.0<H>  /  DBili  x   /  AST  43<H>  /  ALT  185<H>  /  AlkPhos  439<H>  02-06    PT/INR - ( 06 Feb 2022 15:10 )   PT: 21.8 sec;   INR: 1.94 ratio         PTT - ( 06 Feb 2022 15:10 )  PTT:36.8 sec    Cultures;   CAPILLARY BLOOD GLUCOSE        Lipid panel:           RADIOLOGY & ADDITIONAL TESTS:    Imaging Personally Reviewed:  [x ] YES      Consultant(s) Notes Reviewed:  [x ] YES     Care Discussed with [x ] Consultants [X ] Patient [ ] Family  [x ]    [x ]  Other; RN

## 2022-02-06 NOTE — PROGRESS NOTE ADULT - ASSESSMENT
44 years old female with h/o asthma, NSCLC with known brain metastases, S/P XRT and chemotherapy at [facility] lung cancer S/P left craniotomy and resection of mass in 09/2021 (Dr Parviz Paul) present to ED with slurry speech started at 6AM, last known normal was 4AM  Tachycardic to 120s ( per patient, baseline HR in 120), BP stable, afebrile sat well at RA. WBC 13.36, K 3.5, Cr 1.17, AST/ALT 1207/1231, lipase normal, CK 78. CT head with Interval decompressive left frontotemporal craniectomy with decreased mass effect and resolved rightward midline shift secondary to significant left frontal vasogenic edema. No acute intracranial hemorrhage, extra-axial collection or new suspicious region of vasogenic edema. CTA head/neck with no vaso-occlusive disease. CT perfusion-26 mL of tissue with elevated time to maximum in the left superior medial frontal lobe, likely representing chronic posttreatment changes. Not a candidate for TPA.     Admitted with suspected CVA    brain mass lesion w/surrounding edema   present with slurry speech and slight right leg weakness  Not a candidate for TPA  CT head with Interval decompressive left frontotemporal craniectomy with decreased mass effect and resolved rightward midline shift secondary to significant left frontal vasogenic edema. No acute intracranial hemorrhage, extra-axial collection or new suspicious region of vasogenic edema. CTA head/neck with no vaso-occlusive disease. CT perfusion-26 mL of tissue with elevated time to maximum in the left superior medial frontal lobe, likely representing chronic posttreatment changes  Neurology consulted  Aspirin 81mg  No statin due to elevated LFT  MRI brain with and without contrast ;  New postop changes are identified with a large area of abnormal enhancement seen involving the left frontal cortical subcortical region with increased surrounding edema mass effect on left lateral ventricle left-to-right shift. This finding is worrisome for progression of patient's underlying disease process  EEG noted  Dexamethasone 10mg stat and 4mg bid  Neuro check  PT/OT/Speech eval passed; regular diet  rpt MRI or CTH    acute metabolic encephalopathy, suspect Sepsis/TTP/HIT - new mental status changes on 2/6  ICU consulted  send HIT, fibrinogen, PT/PTT, serotonin releasing assay  Platelet x 1u  trend cbc  d/c heparin SQ, ASA    Elevated LFTs. Transaminitis with CBD stone   AST/ALT 1207/1231  New abnormality. No history of ETOH, drug use, recent change in medications  Acute hepatitis panel   GI consulted- Dr Singh  monitor LFT.  plan for MRCP 2/3 preliminary CBD stone  Biliary consult/Dr. Herrera/Francesca; ERCP is not emergent     NSCLC metastatic to brain.   ·  Plan: NSCLC with known brain metastases, S/P XRT and chemotherapy at [facility] lung cancer S/P left craniotomy and resection of mass in 09/2021  On Tagrisso 80mg daily-  to bring home medication  Hematology/oncology consulted- Dr Sorenson.  Routine EEG with left sided dysfunction, no epileptogenic discharge  Continue with  Decadron 4mg BID for Brain edema  No need for Aspirin at this time    Mild intermittent asthma. not in exacerbation. albuterol prn.    Spouse updated.  44 years old female with h/o asthma, NSCLC with known brain metastases, S/P XRT and chemotherapy at [facility] lung cancer S/P left craniotomy and resection of mass in 09/2021 (Dr Parviz Paul) present to ED with slurry speech started at 6AM, last known normal was 4AM  Tachycardic to 120s ( per patient, baseline HR in 120), BP stable, afebrile sat well at RA. WBC 13.36, K 3.5, Cr 1.17, AST/ALT 1207/1231, lipase normal, CK 78. CT head with Interval decompressive left frontotemporal craniectomy with decreased mass effect and resolved rightward midline shift secondary to significant left frontal vasogenic edema. No acute intracranial hemorrhage, extra-axial collection or new suspicious region of vasogenic edema. CTA head/neck with no vaso-occlusive disease. CT perfusion-26 mL of tissue with elevated time to maximum in the left superior medial frontal lobe, likely representing chronic posttreatment changes. Not a candidate for TPA.     Admitted with suspected CVA    brain mass lesion w/surrounding edema   present with slurry speech and slight right leg weakness  Not a candidate for TPA  CT head with Interval decompressive left frontotemporal craniectomy with decreased mass effect and resolved rightward midline shift secondary to significant left frontal vasogenic edema. No acute intracranial hemorrhage, extra-axial collection or new suspicious region of vasogenic edema. CTA head/neck with no vaso-occlusive disease. CT perfusion-26 mL of tissue with elevated time to maximum in the left superior medial frontal lobe, likely representing chronic posttreatment changes  Neurology consulted  Aspirin 81mg  No statin due to elevated LFT  MRI brain with and without contrast ;  New postop changes are identified with a large area of abnormal enhancement seen involving the left frontal cortical subcortical region with increased surrounding edema mass effect on left lateral ventricle left-to-right shift. This finding is worrisome for progression of patient's underlying disease process  EEG noted  Dexamethasone 10mg stat and 4mg bid  Neuro check  PT/OT/Speech eval passed; regular diet  rpt MRI or CTH    acute metabolic encephalopathy, suspect Sepsis/TTP/HIT - new mental status changes on 2/6  ICU consulted  send HIT, fibrinogen, PT/PTT, serotonin releasing assay  Platelet x 1u  trend cbc  d/c heparin SQ, ASA  Blood culture, ua, ucx sent 2/6  ID consulted    Elevated LFTs. Transaminitis with CBD stone   AST/ALT 1207/1231  New abnormality. No history of ETOH, drug use, recent change in medications  Acute hepatitis panel   GI consulted- Dr Singh  monitor LFT.  plan for MRCP 2/3 preliminary CBD stone  Biliary consult/Dr. Herrera/Remelody; ERCP is not emergent     NSCLC metastatic to brain.   ·  Plan: NSCLC with known brain metastases, S/P XRT and chemotherapy at [facility] lung cancer S/P left craniotomy and resection of mass in 09/2021  On Tagrisso 80mg daily-  to bring home medication  Hematology/oncology consulted- Dr Sorenson.  Routine EEG with left sided dysfunction, no epileptogenic discharge  Continue with  Decadron 4mg BID for Brain edema  No need for Aspirin at this time    Mild intermittent asthma. not in exacerbation. albuterol prn.    Spouse updated.

## 2022-02-06 NOTE — PROGRESS NOTE ADULT - PROBLEM SELECTOR PLAN 1
- agree with repeat MRI to look for Swelling in brain to see if improving or not  - continue decadron  - Worsening Bilirubin - GI f/u  - Neurology f/u  - Consider Transfer to [facility]

## 2022-02-06 NOTE — PROGRESS NOTE ADULT - SUBJECTIVE AND OBJECTIVE BOX
lying in bed    Vital Signs Last 24 Hrs  T(C): 37.1 (06 Feb 2022 05:18), Max: 37.6 (06 Feb 2022 02:03)  T(F): 98.7 (06 Feb 2022 05:18), Max: 99.6 (06 Feb 2022 02:03)  HR: 110 (06 Feb 2022 06:11) (110 - 137)  BP: 125/62 (06 Feb 2022 05:18) (95/68 - 125/62)  BP(mean): 88 (05 Feb 2022 16:30) (77 - 88)  RR: 19 (06 Feb 2022 05:18) (18 - 20)  SpO2: 99% (06 Feb 2022 05:18) (99% - 100%)    PHYSICAL EXAM:    general - weak looking +  HEENT - + Icterus  CVS - RRR  RS - AE B/L  Abd - soft, NT  Ext - Pulses +        LABS:                        12.2   6.05  )-----------( 182      ( 04 Feb 2022 10:14 )             37.2     02-06    139  |  111<H>  |  23  ----------------------------<  145<H>  4.0   |  18<L>  |  0.85    Ca    8.3<L>      06 Feb 2022 07:25    TPro  6.5  /  Alb  1.9<L>  /  TBili  7.0<H>  /  DBili  x   /  AST  43<H>  /  ALT  185<H>  /  AlkPhos  439<H>  02-06          Culture - Urine (collected 02 Feb 2022 18:54)  Source: Clean Catch Clean Catch (Midstream)  Final Report (05 Feb 2022 18:30):    >100,000 CFU/ml Proteus mirabilis    <10,000 CFU/ml Normal Urogenital manish present  Organism: Proteus mirabilis (05 Feb 2022 18:30)  Organism: Proteus mirabilis (05 Feb 2022 18:30)

## 2022-02-06 NOTE — PROGRESS NOTE ADULT - PROBLEM SELECTOR PLAN 1
- ERCP is indicated but the timing for procedure is unclear  - will discuss with Neurology regarding risk for general anesthesia post CVA    - LFTs are improving and ERCP is not emergent would hold at this time until fu MRI for brain on decadron and anticoagulation therapy is settled.  Bilirubin is lagging ? fatty liver concomitantly   - long discuss with  2/5.

## 2022-02-07 ENCOUNTER — TRANSCRIPTION ENCOUNTER (OUTPATIENT)
Age: 47
End: 2022-02-07

## 2022-02-07 LAB
-  ENTEROBACTER (NON-CLOACAE COMPLEX): SIGNIFICANT CHANGE UP
ABO RH CONFIRMATION: SIGNIFICANT CHANGE UP
ALBUMIN SERPL ELPH-MCNC: 1.8 G/DL — LOW (ref 3.3–5)
ALP SERPL-CCNC: 299 U/L — HIGH (ref 40–120)
ALT FLD-CCNC: 118 U/L — HIGH (ref 12–78)
ANION GAP SERPL CALC-SCNC: 7 MMOL/L — SIGNIFICANT CHANGE UP (ref 5–17)
APPEARANCE UR: CLEAR — SIGNIFICANT CHANGE UP
APTT BLD: 29.7 SEC — SIGNIFICANT CHANGE UP (ref 27.5–35.5)
AST SERPL-CCNC: 33 U/L — SIGNIFICANT CHANGE UP (ref 15–37)
BACTERIA # UR AUTO: ABNORMAL
BILIRUB SERPL-MCNC: 4 MG/DL — HIGH (ref 0.2–1.2)
BILIRUB UR-MCNC: ABNORMAL
BUN SERPL-MCNC: 30 MG/DL — HIGH (ref 7–23)
CALCIUM SERPL-MCNC: 8.2 MG/DL — LOW (ref 8.5–10.1)
CHLORIDE SERPL-SCNC: 116 MMOL/L — HIGH (ref 96–108)
CO2 SERPL-SCNC: 22 MMOL/L — SIGNIFICANT CHANGE UP (ref 22–31)
COLOR SPEC: YELLOW — SIGNIFICANT CHANGE UP
CREAT SERPL-MCNC: 0.63 MG/DL — SIGNIFICANT CHANGE UP (ref 0.5–1.3)
DIFF PNL FLD: ABNORMAL
EPI CELLS # UR: SIGNIFICANT CHANGE UP
FIBRINOGEN PPP-MCNC: 1057 MG/DL — HIGH (ref 290–520)
FLUAV AG NPH QL: SIGNIFICANT CHANGE UP
FLUBV AG NPH QL: SIGNIFICANT CHANGE UP
GLUCOSE BLDC GLUCOMTR-MCNC: 131 MG/DL — HIGH (ref 70–99)
GLUCOSE BLDC GLUCOMTR-MCNC: 152 MG/DL — HIGH (ref 70–99)
GLUCOSE BLDC GLUCOMTR-MCNC: 182 MG/DL — HIGH (ref 70–99)
GLUCOSE SERPL-MCNC: 142 MG/DL — HIGH (ref 70–99)
GLUCOSE UR QL: NEGATIVE MG/DL — SIGNIFICANT CHANGE UP
GRAM STN FLD: SIGNIFICANT CHANGE UP
GRAM STN FLD: SIGNIFICANT CHANGE UP
HCT VFR BLD CALC: 26.8 % — LOW (ref 34.5–45)
HCT VFR BLD CALC: 27.2 % — LOW (ref 34.5–45)
HEPARIN-PF4 AB RESULT: <0.6 U/ML — SIGNIFICANT CHANGE UP (ref 0–0.9)
HGB BLD-MCNC: 9 G/DL — LOW (ref 11.5–15.5)
HGB BLD-MCNC: 9.3 G/DL — LOW (ref 11.5–15.5)
INR BLD: 1.62 RATIO — HIGH (ref 0.88–1.16)
KETONES UR-MCNC: ABNORMAL
LEUKOCYTE ESTERASE UR-ACNC: ABNORMAL
MCHC RBC-ENTMCNC: 29.1 PG — SIGNIFICANT CHANGE UP (ref 27–34)
MCHC RBC-ENTMCNC: 29.2 PG — SIGNIFICANT CHANGE UP (ref 27–34)
MCHC RBC-ENTMCNC: 33.6 G/DL — SIGNIFICANT CHANGE UP (ref 32–36)
MCHC RBC-ENTMCNC: 34.2 G/DL — SIGNIFICANT CHANGE UP (ref 32–36)
MCV RBC AUTO: 85.5 FL — SIGNIFICANT CHANGE UP (ref 80–100)
MCV RBC AUTO: 86.7 FL — SIGNIFICANT CHANGE UP (ref 80–100)
METHOD TYPE: SIGNIFICANT CHANGE UP
NITRITE UR-MCNC: NEGATIVE — SIGNIFICANT CHANGE UP
NRBC # BLD: 0 /100 WBCS — SIGNIFICANT CHANGE UP (ref 0–0)
NRBC # BLD: 0 /100 WBCS — SIGNIFICANT CHANGE UP (ref 0–0)
PF4 HEPARIN CMPLX AB SER-ACNC: NEGATIVE — SIGNIFICANT CHANGE UP
PH UR: 5 — SIGNIFICANT CHANGE UP (ref 5–8)
PLATELET # BLD AUTO: 5 K/UL — CRITICAL LOW (ref 150–400)
PLATELET # BLD AUTO: 64 K/UL — LOW (ref 150–400)
POTASSIUM SERPL-MCNC: 3.4 MMOL/L — LOW (ref 3.5–5.3)
POTASSIUM SERPL-SCNC: 3.4 MMOL/L — LOW (ref 3.5–5.3)
PROT SERPL-MCNC: 6.1 GM/DL — SIGNIFICANT CHANGE UP (ref 6–8.3)
PROT UR-MCNC: 100 MG/DL
PROTHROM AB SERPL-ACNC: 18.4 SEC — HIGH (ref 10.6–13.6)
RBC # BLD: 3.09 M/UL — LOW (ref 3.8–5.2)
RBC # BLD: 3.18 M/UL — LOW (ref 3.8–5.2)
RBC # FLD: 16.8 % — HIGH (ref 10.3–14.5)
RBC # FLD: 16.9 % — HIGH (ref 10.3–14.5)
RBC CASTS # UR COMP ASSIST: ABNORMAL /HPF (ref 0–4)
SARS-COV-2 RNA SPEC QL NAA+PROBE: SIGNIFICANT CHANGE UP
SODIUM SERPL-SCNC: 145 MMOL/L — SIGNIFICANT CHANGE UP (ref 135–145)
SP GR SPEC: 1.01 — SIGNIFICANT CHANGE UP (ref 1.01–1.02)
SPECIMEN SOURCE: SIGNIFICANT CHANGE UP
SPECIMEN SOURCE: SIGNIFICANT CHANGE UP
UROBILINOGEN FLD QL: 4 MG/DL
VALPROATE SERPL-MCNC: 45 UG/ML — LOW (ref 50–100)
WBC # BLD: 27.06 K/UL — HIGH (ref 3.8–10.5)
WBC # BLD: 30.1 K/UL — HIGH (ref 3.8–10.5)
WBC # FLD AUTO: 27.06 K/UL — HIGH (ref 3.8–10.5)
WBC # FLD AUTO: 30.1 K/UL — HIGH (ref 3.8–10.5)
WBC UR QL: ABNORMAL

## 2022-02-07 PROCEDURE — 70553 MRI BRAIN STEM W/O & W/DYE: CPT | Mod: 26

## 2022-02-07 PROCEDURE — 70450 CT HEAD/BRAIN W/O DYE: CPT | Mod: 26

## 2022-02-07 PROCEDURE — 99233 SBSQ HOSP IP/OBS HIGH 50: CPT

## 2022-02-07 PROCEDURE — 99223 1ST HOSP IP/OBS HIGH 75: CPT

## 2022-02-07 RX ORDER — VALPROIC ACID (AS SODIUM SALT) 250 MG/5ML
0 SOLUTION, ORAL ORAL
Qty: 0 | Refills: 0 | DISCHARGE

## 2022-02-07 RX ORDER — MONTELUKAST 4 MG/1
1 TABLET, CHEWABLE ORAL
Qty: 0 | Refills: 0 | DISCHARGE

## 2022-02-07 RX ORDER — AMIKACIN SULFATE 250 MG/ML
900 INJECTION, SOLUTION INTRAMUSCULAR; INTRAVENOUS ONCE
Refills: 0 | Status: COMPLETED | OUTPATIENT
Start: 2022-02-07 | End: 2022-02-07

## 2022-02-07 RX ORDER — MONTELUKAST 4 MG/1
1 TABLET, CHEWABLE ORAL
Qty: 0 | Refills: 0 | DISCHARGE
Start: 2022-02-07

## 2022-02-07 RX ORDER — DEXAMETHASONE 0.5 MG/5ML
4 ELIXIR ORAL
Qty: 0 | Refills: 0 | DISCHARGE
Start: 2022-02-07

## 2022-02-07 RX ORDER — MEROPENEM 1 G/30ML
1000 INJECTION INTRAVENOUS EVERY 8 HOURS
Refills: 0 | Status: DISCONTINUED | OUTPATIENT
Start: 2022-02-07 | End: 2022-02-11

## 2022-02-07 RX ORDER — PROCHLORPERAZINE MALEATE 5 MG
1 TABLET ORAL
Qty: 0 | Refills: 0 | DISCHARGE
Start: 2022-02-07

## 2022-02-07 RX ORDER — PROCHLORPERAZINE MALEATE 5 MG
1 TABLET ORAL
Qty: 0 | Refills: 0 | DISCHARGE

## 2022-02-07 RX ORDER — POTASSIUM CHLORIDE 20 MEQ
40 PACKET (EA) ORAL ONCE
Refills: 0 | Status: COMPLETED | OUTPATIENT
Start: 2022-02-07 | End: 2022-02-07

## 2022-02-07 RX ORDER — METOPROLOL TARTRATE 50 MG
5 TABLET ORAL
Qty: 0 | Refills: 0 | DISCHARGE
Start: 2022-02-07

## 2022-02-07 RX ORDER — VALPROIC ACID (AS SODIUM SALT) 250 MG/5ML
1 SOLUTION, ORAL ORAL
Qty: 0 | Refills: 0 | DISCHARGE
Start: 2022-02-07

## 2022-02-07 RX ORDER — PIPERACILLIN AND TAZOBACTAM 4; .5 G/20ML; G/20ML
3.38 INJECTION, POWDER, LYOPHILIZED, FOR SOLUTION INTRAVENOUS
Qty: 0 | Refills: 0 | DISCHARGE
Start: 2022-02-07

## 2022-02-07 RX ADMIN — Medication 40 MILLIEQUIVALENT(S): at 12:13

## 2022-02-07 RX ADMIN — PIPERACILLIN AND TAZOBACTAM 25 GRAM(S): 4; .5 INJECTION, POWDER, LYOPHILIZED, FOR SOLUTION INTRAVENOUS at 15:35

## 2022-02-07 RX ADMIN — AMIKACIN SULFATE 253.6 MILLIGRAM(S): 250 INJECTION, SOLUTION INTRAMUSCULAR; INTRAVENOUS at 15:24

## 2022-02-07 RX ADMIN — Medication 5 MILLIGRAM(S): at 06:17

## 2022-02-07 RX ADMIN — MONTELUKAST 5 MILLIGRAM(S): 4 TABLET, CHEWABLE ORAL at 12:13

## 2022-02-07 RX ADMIN — SODIUM CHLORIDE 100 MILLILITER(S): 9 INJECTION INTRAMUSCULAR; INTRAVENOUS; SUBCUTANEOUS at 12:16

## 2022-02-07 RX ADMIN — PIPERACILLIN AND TAZOBACTAM 25 GRAM(S): 4; .5 INJECTION, POWDER, LYOPHILIZED, FOR SOLUTION INTRAVENOUS at 21:17

## 2022-02-07 RX ADMIN — Medication 5 MILLIGRAM(S): at 12:12

## 2022-02-07 RX ADMIN — Medication 5 MILLIGRAM(S): at 00:52

## 2022-02-07 RX ADMIN — Medication 250 MILLIGRAM(S): at 19:10

## 2022-02-07 RX ADMIN — PIPERACILLIN AND TAZOBACTAM 25 GRAM(S): 4; .5 INJECTION, POWDER, LYOPHILIZED, FOR SOLUTION INTRAVENOUS at 06:18

## 2022-02-07 RX ADMIN — Medication 250 MILLIGRAM(S): at 06:17

## 2022-02-07 RX ADMIN — Medication 4 MILLIGRAM(S): at 06:17

## 2022-02-07 RX ADMIN — Medication 5 MILLIGRAM(S): at 19:10

## 2022-02-07 RX ADMIN — Medication 4 MILLIGRAM(S): at 19:10

## 2022-02-07 NOTE — PROGRESS NOTE ADULT - ASSESSMENT
44 years old female with h/o asthma, NSCLC with known brain metastases, S/P XRT and chemotherapy at [facility] lung cancer S/P left craniotomy and resection of mass in 09/2021 (Dr Parviz Paul) present to ED with slurry speech started at 6AM, last known normal was 4AM  Tachycardic to 120s ( per patient, baseline HR in 120), BP stable, afebrile sat well at RA. WBC 13.36, K 3.5, Cr 1.17, AST/ALT 1207/1231, lipase normal, CK 78. CT head with Interval decompressive left frontotemporal craniectomy with decreased mass effect and resolved rightward midline shift secondary to significant left frontal vasogenic edema. No acute intracranial hemorrhage, extra-axial collection or new suspicious region of vasogenic edema. CTA head/neck with no vaso-occlusive disease. CT perfusion-26 mL of tissue with elevated time to maximum in the left superior medial frontal lobe, likely representing chronic posttreatment changes. Not a candidate for TPA.     Admitted with suspected CVA    brain mass lesion w/surrounding edema   present with slurry speech and slight right leg weakness  Not a candidate for TPA  CT head with Interval decompressive left frontotemporal craniectomy with decreased mass effect and resolved rightward midline shift secondary to significant left frontal vasogenic edema. No acute intracranial hemorrhage, extra-axial collection or new suspicious region of vasogenic edema. CTA head/neck with no vaso-occlusive disease. CT perfusion-26 mL of tissue with elevated time to maximum in the left superior medial frontal lobe, likely representing chronic posttreatment changes  Neurology consulted  Aspirin 81mg  No statin due to elevated LFT  MRI brain with and without contrast ;  New postop changes are identified with a large area of abnormal enhancement seen involving the left frontal cortical subcortical region with increased surrounding edema mass effect on left lateral ventricle left-to-right shift. This finding is worrisome for progression of patient's underlying disease process  EEG noted  Dexamethasone 10mg stat and 4mg bid  Neuro check  PT/OT/Speech eval passed; regular diet  rpt MRI pending  2/6 CTH- no bleed, rpt today as thrombocytopenia     acute metabolic encephalopathy, suspect Sepsis/TTP/HIT - new mental status changes on 2/6  ICU consulted  send HIT, fibrinogen, PT/PTT, serotonin releasing assay, rpt today  s/p 2 units Platelet, give x 2u today  trend cbc  d/c heparin SQ, ASA  Blood culture, ua, ucx sent 2/6  ID consulted    Elevated LFTs. Transaminitis with CBD stone   AST/ALT 1207/1231  New abnormality. No history of ETOH, drug use, recent change in medications  Acute hepatitis panel   GI consulted- Dr Singh  monitor LFT.  need MRCP for CBD stone  Biliary consult/Dr. Herrera/Reiders; ERCP is not emergent   RUQ abd US- pending     NSCLC metastatic to brain.   ·  Plan: NSCLC with known brain metastases, S/P XRT and chemotherapy at [facility] lung cancer S/P left craniotomy and resection of mass in 09/2021  On Tagrisso 80mg daily-  to bring home medication  Hematology/oncology consulted- Dr Sorenson.  Routine EEG with left sided dysfunction, no epileptogenic discharge  Continue with  Decadron 4mg BID for Brain edema  No need for Aspirin at this time    Mild intermittent asthma. not in exacerbation. albuterol prn.    Spouse updated. spoke with oncologist Dr. Shi and transfer center today. Pt has been accepted to [facility] for further care.gigi Javier is accepting MD.

## 2022-02-07 NOTE — PROGRESS NOTE ADULT - SUBJECTIVE AND OBJECTIVE BOX
Events Noted    Vital Signs Last 24 Hrs  T(C): 36.9 (2022 01:00), Max: 36.9 (2022 01:00)  T(F): 98.4 (2022 01:00), Max: 98.4 (2022 01:00)  HR: 96 (2022 01:00) (96 - 133)  BP: 131/81 (2022 01:00) (107/63 - 132/87)  BP(mean): 89 (2022 16:13) (87 - 89)  RR: 19 (2022 01:00) (17 - 19)  SpO2: 99% (2022 01:00) (98% - 99%)    PHYSICAL EXAM:    general - Weak Looking +  HEENT - No Icterus  CVS - RRR  RS - AE B/L  Abd - soft, NT  Ext - Pulses +        LABS:                        9.0    30.10 )-----------( 5        ( 2022 08:37 )             26.8     02-07    145  |  116<H>  |  30<H>  ----------------------------<  142<H>  3.4<L>   |  22  |  0.63    Ca    8.2<L>      2022 08:37    TPro  6.1  /  Alb  1.8<L>  /  TBili  4.0<H>  /  DBili  x   /  AST  33  /  ALT  118<H>  /  AlkPhos  299<H>  02-07    PT/INR - ( 2022 15:10 )   PT: 21.8 sec;   INR: 1.94 ratio         PTT - ( 2022 15:10 )  PTT:36.8 sec  Urinalysis Basic - ( 2022 01:35 )    Color: Yellow / Appearance: Clear / S.010 / pH: x  Gluc: x / Ketone: Trace  / Bili: Large / Urobili: 4 mg/dL   Blood: x / Protein: 100 mg/dL / Nitrite: Negative   Leuk Esterase: Small / RBC: 6-10 /HPF / WBC 6-10   Sq Epi: x / Non Sq Epi: Few / Bacteria: Moderate        Culture - Urine (collected 2022 18:54)  Source: Clean Catch Clean Catch (Midstream)  Final Report (2022 18:30):    >100,000 CFU/ml Proteus mirabilis    <10,000 CFU/ml Normal Urogenital manish present  Organism: Proteus mirabilis (2022 18:30)  Organism: Proteus mirabilis (2022 18:30)

## 2022-02-07 NOTE — DISCHARGE NOTE PROVIDER - NSDCHC_MEDRECSTATUS_GEN_ALL_CORE
- Improving.  - Monitor.     Admission Reconciliation is Completed  Discharge Reconciliation is Completed Admission Reconciliation is Completed  Discharge Reconciliation is Not Complete

## 2022-02-07 NOTE — DISCHARGE NOTE PROVIDER - CARE PROVIDER_API CALL
Bradley Sorenson)  Hematology; Internal Medicine; Medical Oncology  [address]  [place] [address]  Phone: [phone]  Fax: [phone]  Follow Up Time:    Bradley Sorenson)  Hematology; Internal Medicine; Medical Oncology  [address]  [place] [address]  Phone: [phone]  Fax: [phone]  Follow Up Time:     please follow discharge instructions at  [facility],   Phone: (   )    -  Fax: (   )    -  Follow Up Time:

## 2022-02-07 NOTE — DISCHARGE NOTE PROVIDER - PROVIDER TOKENS
PROVIDER:[TOKEN:[[id number]]] PROVIDER:[TOKEN:[[id number]]],FREE:[LAST:[please follow discharge instructions at  [facility]],PHONE:[(   )    -],FAX:[(   )    -]]

## 2022-02-07 NOTE — DISCHARGE NOTE PROVIDER - NSDCCPCAREPLAN_GEN_ALL_CORE_FT
PRINCIPAL DISCHARGE DIAGNOSIS  Diagnosis: Slurred speech  Assessment and Plan of Treatment: present with slurry speech and slight right leg weakness  Not a candidate for TPA  CT head with Interval decompressive left frontotemporal craniectomy with decreased mass effect and resolved rightward midline shift secondary to significant left frontal vasogenic edema. No acute intracranial hemorrhage, extra-axial collection or new suspicious region of vasogenic edema. CTA head/neck with no vaso-occlusive disease. CT perfusion-26 mL of tissue with elevated time to maximum in the left superior medial frontal lobe, likely representing chronic posttreatment changes  Neurology consulted  No statin due to elevated LFT  MRI brain with and without contrast ;  New postop changes are identified with a large area of abnormal enhancement seen involving the left frontal cortical subcortical region with increased surrounding edema mass effect on left lateral ventricle left-to-right shift. This finding is worrisome for progression of patient's underlying disease process  EEG noted  Dexamethasone 10mg stat and 4mg bid  Neuro check  PT/OT/Speech eval passed; regular diet  rpt MRI or CTH      SECONDARY DISCHARGE DIAGNOSES  Diagnosis: NSCLC metastatic to brain  Assessment and Plan of Treatment: NSCLC metastatic to brain.   ·  Plan: NSCLC with known brain metastases, S/P XRT and chemotherapy at [facility] lung cancer S/P left craniotomy and resection of mass in 09/2021  On Tagrisso 80mg daily-  to bring home medication  Hematology/oncology consulted- Dr Sorenson.  Routine EEG with left sided dysfunction, no epileptogenic discharge  Continue with  Decadron 4mg BID for Brain edema    Diagnosis: Elevated LFTs  Assessment and Plan of Treatment: Elevated LFTs. Transaminitis with CBD stone   AST/ALT 1207/1231  New abnormality. No history of ETOH, drug use, recent change in medications  Acute hepatitis panel   GI consulted- Dr Singh  monitor LFT.  plan for MRCP 2/3 preliminary CBD stone  Biliary consult/Dr. Herrera/Francesca; ERCP is not emergent    Diagnosis: Mild intermittent asthma  Assessment and Plan of Treatment:     Diagnosis: Choledocholithiasis  Assessment and Plan of Treatment:     Diagnosis: Thrombocytopenia  Assessment and Plan of Treatment: acute metabolic encephalopathy, suspect Sepsis/TTP/HIT - new mental status changes on 2/6  ICU consulted  send HIT (negative), fibrinogen (not consistent with DIC), PT/PTT, serotonin releasing assay  Platelet x 3 units  trend cbc  d/c heparin SQ, ASA  Blood culture, ua, ucx sent 2/6-GNR   ID consulted-amikacin    Diagnosis: Bacteremia  Assessment and Plan of Treatment: gram negative bacteremia both sets of blood cultures zosyn, amikacin x1.     PRINCIPAL DISCHARGE DIAGNOSIS  Diagnosis: Slurred speech  Assessment and Plan of Treatment:       SECONDARY DISCHARGE DIAGNOSES  Diagnosis: Elevated LFTs  Assessment and Plan of Treatment:     Diagnosis: NSCLC metastatic to brain  Assessment and Plan of Treatment:     Diagnosis: Thrombocytopenia  Assessment and Plan of Treatment:     Diagnosis: Mild intermittent asthma  Assessment and Plan of Treatment:     Diagnosis: Choledocholithiasis  Assessment and Plan of Treatment:     Diagnosis: Bacteremia  Assessment and Plan of Treatment:

## 2022-02-07 NOTE — PROGRESS NOTE ADULT - PROBLEM SELECTOR PLAN 1
- Peripheral Smear Reviewed Both 2/6 and 2/7 - Thrombocytopenia, no clumps, Rare Schistocytes seen, not indicative of MAHA  - Heparin Antibody  - Platelet Transfusion in setting of Brain lesion and severe thrombocytopenia  - Decadron for Brain Edema  - Fibrinogen not indicative of DIC  - ID evaluation  - Antibiotics  - Prognosis guarded

## 2022-02-07 NOTE — DISCHARGE NOTE PROVIDER - ATTENDING DISCHARGE PHYSICAL EXAMINATION:
PHYSICAL EXAM:  GENERAL: Moderately built, no acute distress, speaking in foul language   CHEST/LUNG: Pt refused  HEART: RRR, Pt refused  ABDOMEN: patient refused  EXTREMITIES:  Pt refused  NERVOUS SYSTEM:  Limited exam due to poor participation.

## 2022-02-07 NOTE — PROGRESS NOTE ADULT - SUBJECTIVE AND OBJECTIVE BOX
Patient is a 47y old  Female who presents with a chief complaint of CVA (2022 14:30)      OVERNIGHT EVENTS:    REVIEW OF SYSTEMS: denies chest pain/SOB, diaphoresis, no F/C, cough, dizziness, headache, blurry vision, nausea, vomiting, abdominal pain. All others review of systems negative     MEDICATIONS  (STANDING):  dexAMETHasone  Injectable 4 milliGRAM(s) IV Push two times a day  dextrose 40% Gel 15 Gram(s) Oral once  dextrose 50% Injectable 25 Gram(s) IV Push once  dextrose 50% Injectable 12.5 Gram(s) IV Push once  dextrose 50% Injectable 25 Gram(s) IV Push once  glucagon  Injectable 1 milliGRAM(s) IntraMuscular once  metoprolol tartrate Injectable 5 milliGRAM(s) IV Push every 6 hours  montelukast  Chewable 5 milliGRAM(s) Oral daily  piperacillin/tazobactam IVPB.. 3.375 Gram(s) IV Intermittent every 8 hours  sodium chloride 0.9%. 1000 milliLiter(s) (100 mL/Hr) IV Continuous <Continuous>  valproic acid 250 milliGRAM(s) Oral two times a day    MEDICATIONS  (PRN):  melatonin 3 milliGRAM(s) Oral at bedtime PRN Insomnia  prochlorperazine   Tablet 10 milliGRAM(s) Oral every 6 hours PRN nausea, vomiting      Allergies    codeine (Other)  latex (Rash)    Intolerances        T(F): 97.7 (22 @ 14:40), Max: 98.4 (22 @ 01:00)  HR: 87 (22 @ 14:40) (73 - 133)  BP: 115/79 (22 @ 14:40) (105/72 - 132/87)  RR: 19 (22 @ 14:40) (17 - 19)  SpO2: 100% (22 @ 14:40) (98% - 100%)  Wt(kg): --    PHYSICAL EXAM:  GENERAL: NAD  HEAD:  Atraumatic, Normocephalic  EYES: PERRLA, conjunctiva and sclera clear  ENMT: Moist mucous membranes  NECK: Supple, No JVD, Normal thyroid  NERVOUS SYSTEM:  Alert & Awake  CHEST/LUNG: Clear to percussion bilaterally;   HEART: Regular rate and rhythm;   ABDOMEN: Soft, Nontender, Nondistended; Bowel sounds present  EXTREMITIES:  no edema BL LE  SKIN: moist    LABS:                        9.0    30.10 )-----------( 5        ( 2022 08:37 )             26.8     02    145  |  116<H>  |  30<H>  ----------------------------<  142<H>  3.4<L>   |  22  |  0.63    Ca    8.2<L>      2022 08:37    TPro  6.1  /  Alb  1.8<L>  /  TBili  4.0<H>  /  DBili  x   /  AST  33  /  ALT  118<H>  /  AlkPhos  299<H>      PT/INR - ( 2022 15:10 )   PT: 21.8 sec;   INR: 1.94 ratio         PTT - ( 2022 15:10 )  PTT:36.8 sec  Urinalysis Basic - ( 2022 01:35 )    Color: Yellow / Appearance: Clear / S.010 / pH: x  Gluc: x / Ketone: Trace  / Bili: Large / Urobili: 4 mg/dL   Blood: x / Protein: 100 mg/dL / Nitrite: Negative   Leuk Esterase: Small / RBC: 6-10 /HPF / WBC 6-10   Sq Epi: x / Non Sq Epi: Few / Bacteria: Moderate      Cultures;   CAPILLARY BLOOD GLUCOSE      POCT Blood Glucose.: 152 mg/dL (2022 11:46)    Lipid panel:           RADIOLOGY & ADDITIONAL TESTS:    Imaging Personally Reviewed:  [x ] YES      Consultant(s) Notes Reviewed:  [x ] YES     Care Discussed with [x ] Consultants [X ] Patient [ ] Family  [x ]    [x ]  Other; RN

## 2022-02-07 NOTE — DISCHARGE NOTE PROVIDER - DETAILS OF MALNUTRITION DIAGNOSIS/DIAGNOSES
This patient has been assessed with a concern for Malnutrition and was treated during this hospitalization for the following Nutrition diagnosis/diagnoses:     -  02/09/2022: Moderate protein-calorie malnutrition   This patient has been assessed with a concern for Malnutrition and was treated during this hospitalization for the following Nutrition diagnosis/diagnoses:     -  02/24/2022: Severe protein-calorie malnutrition   -  02/09/2022: Moderate protein-calorie malnutrition

## 2022-02-07 NOTE — DISCHARGE NOTE PROVIDER - HOSPITAL COURSE
44 years old female with h/o asthma, NSCLC with known brain metastases, S/P XRT and chemotherapy at [facility] lung cancer S/P left craniotomy and resection of mass in 09/2021 (Dr Parviz Paul) present to ED with slurry speech started at 6AM, last known normal was 4AM  Tachycardic to 120s ( per patient, baseline HR in 120), BP stable, afebrile sat well at RA. WBC 13.36, K 3.5, Cr 1.17, AST/ALT 1207/1231, lipase normal, CK 78. CT head with Interval decompressive left frontotemporal craniectomy with decreased mass effect and resolved rightward midline shift secondary to significant left frontal vasogenic edema. No acute intracranial hemorrhage, extra-axial collection or new suspicious region of vasogenic edema. CTA head/neck with no vaso-occlusive disease. CT perfusion-26 mL of tissue with elevated time to maximum in the left superior medial frontal lobe, likely representing chronic posttreatment changes. Not a candidate for TPA.     Admitted with suspected CVA    brain mass lesion w/surrounding edema   present with slurry speech and slight right leg weakness  Not a candidate for TPA  CT head with Interval decompressive left frontotemporal craniectomy with decreased mass effect and resolved rightward midline shift secondary to significant left frontal vasogenic edema. No acute intracranial hemorrhage, extra-axial collection or new suspicious region of vasogenic edema. CTA head/neck with no vaso-occlusive disease. CT perfusion-26 mL of tissue with elevated time to maximum in the left superior medial frontal lobe, likely representing chronic posttreatment changes  Neurology consulted  No statin due to elevated LFT  MRI brain with and without contrast ;  New postop changes are identified with a large area of abnormal enhancement seen involving the left frontal cortical subcortical region with increased surrounding edema mass effect on left lateral ventricle left-to-right shift. This finding is worrisome for progression of patient's underlying disease process  EEG noted  Dexamethasone 10mg stat and 4mg bid  Neuro check  PT/OT/Speech eval passed; regular diet  rpt MRI or CTH    acute metabolic encephalopathy, suspect Sepsis/TTP/HIT - new mental status changes on 2/6  ICU consulted  send HIT (negative), fibrinogen (not consistent with DIC), PT/PTT, serotonin releasing assay  Platelet x 3 units  trend cbc  d/c heparin SQ, ASA  Blood culture, ua, ucx sent 2/6-GNR   ID consulted-amikacin    Elevated LFTs. Transaminitis with CBD stone   AST/ALT 1207/1231  New abnormality. No history of ETOH, drug use, recent change in medications  Acute hepatitis panel   GI consulted- Dr Singh  monitor LFT.  plan for MRCP 2/3 preliminary CBD stone  Biliary consult/Dr. Herrera/Reiders; ERCP is not emergent     NSCLC metastatic to brain.   ·  Plan: NSCLC with known brain metastases, S/P XRT and chemotherapy at [facility] lung cancer S/P left craniotomy and resection of mass in 09/2021  On Tagrisso 80mg daily-  to bring home medication  Hematology/oncology consulted- Dr Sorenson.  Routine EEG with left sided dysfunction, no epileptogenic discharge  Continue with  Decadron 4mg BID for Brain edema    Mild intermittent asthma. not in exacerbation. albuterol prn.    Patient with worsening thrombocytopenia high risk for spontaneous bleed. s/p 3u PLT transfusion.   Dr. Gerardo discussed with Dr. Javier at [facility], patient will be transferred back to [facility] where she had her original craniotomy for continued care with initial team. 44 years old female with h/o asthma, NSCLC with known brain metastases, S/P XRT and chemotherapy at [facility] lung cancer S/P left craniotomy and resection of mass in 09/2021 (Dr Parviz Paul) present to ED with slurry speech started at 6AM, last known normal was 4AM  Tachycardic to 120s ( per patient, baseline HR in 120), BP stable, afebrile sat well at RA. WBC 13.36, K 3.5, Cr 1.17, AST/ALT 1207/1231, lipase normal, CK 78. CT head with Interval decompressive left frontotemporal craniectomy with decreased mass effect and resolved rightward midline shift secondary to significant left frontal vasogenic edema. No acute intracranial hemorrhage, extra-axial collection or new suspicious region of vasogenic edema. CTA head/neck with no vaso-occlusive disease. CT perfusion-26 mL of tissue with elevated time to maximum in the left superior medial frontal lobe, likely representing chronic posttreatment changes. Not a candidate for TPA.     Admitted with suspected CVA    brain mass lesion w/surrounding edema   present with slurry speech and slight right leg weakness  Not a candidate for TPA  CT head with Interval decompressive left frontotemporal craniectomy with decreased mass effect and resolved rightward midline shift secondary to significant left frontal vasogenic edema. No acute intracranial hemorrhage, extra-axial collection or new suspicious region of vasogenic edema. CTA head/neck with no vaso-occlusive disease. CT perfusion-26 mL of tissue with elevated time to maximum in the left superior medial frontal lobe, likely representing chronic posttreatment changes  Neurology consulted  No statin due to elevated LFT  MRI brain with and without contrast ;  New postop changes are identified with a large area of abnormal enhancement seen involving the left frontal cortical subcortical region with increased surrounding edema mass effect on left lateral ventricle left-to-right shift. This finding is worrisome for progression of patient's underlying disease process  EEG noted  Dexamethasone 10mg stat and 4mg bid  Neuro check  PT/OT/Speech eval passed; regular diet  rpt MRI or CTH    acute metabolic encephalopathy, suspect Sepsis/TTP/HIT - new mental status changes on 2/6  ICU consulted  send HIT (negative), fibrinogen (not consistent with DIC), PT/PTT, serotonin releasing assay  Platelet x 3 units  trend cbc  d/c heparin SQ, ASA  Blood culture, ua, ucx sent 2/6-GNR   ID consulted-amikacin    Elevated LFTs. Transaminitis with CBD stone   AST/ALT 1207/1231  New abnormality. No history of ETOH, drug use, recent change in medications  Acute hepatitis panel   GI consulted- Dr Singh  monitor LFT.  plan for MRCP 2/3 preliminary CBD stone  Biliary consult/Dr. Herrera/Reiders; ERCP is not emergent     NSCLC metastatic to brain.   ·  Plan: NSCLC with known brain metastases, S/P XRT and chemotherapy at [facility] lung cancer S/P left craniotomy and resection of mass in 09/2021  On Tagrisso 80mg daily-  to bring home medication  Hematology/oncology consulted- Dr Sorenson.  Routine EEG with left sided dysfunction, no epileptogenic discharge  Continue with  Decadron 4mg BID for Brain edema    Mild intermittent asthma. not in exacerbation. albuterol prn.    Patient with worsening thrombocytopenia high risk for spontaneous bleed. s/p 3u PLT transfusion.   Patient initially discussed with [facility] team who originally performed craniotomy however transfer declined.   Dr. Gerardo discussed with Dr. Javier at [facility] who will accept patient for admission. 44 years old female with h/o asthma, NSCLC with known brain metastases, S/P XRT and chemotherapy at [facility] lung cancer S/P left craniotomy and resection of mass in 09/2021 (Dr Parviz Paul) present to ED with slurry speech started at 6AM, last known normal was 4AM  Tachycardic to 120s ( per patient, baseline HR in 120), BP stable, afebrile sat well at RA. WBC 13.36, K 3.5, Cr 1.17, AST/ALT 1207/1231, lipase normal, CK 78. CT head with Interval decompressive left frontotemporal craniectomy with decreased mass effect and resolved rightward midline shift secondary to significant left frontal vasogenic edema. No acute intracranial hemorrhage, extra-axial collection or new suspicious region of vasogenic edema. CTA head/neck with no vaso-occlusive disease. CT perfusion-26 mL of tissue with elevated time to maximum in the left superior medial frontal lobe, likely representing chronic posttreatment changes. Not a candidate for TPA.     Admitted with suspected CVA    brain mass lesion w/surrounding edema   present with slurry speech and slight right leg weakness  Not a candidate for TPA  CT head with Interval decompressive left frontotemporal craniectomy with decreased mass effect and resolved rightward midline shift secondary to significant left frontal vasogenic edema. No acute intracranial hemorrhage, extra-axial collection or new suspicious region of vasogenic edema. CTA head/neck with no vaso-occlusive disease. CT perfusion-26 mL of tissue with elevated time to maximum in the left superior medial frontal lobe, likely representing chronic posttreatment changes  Neurology consulted  No statin due to elevated LFT  MRI brain with and without contrast ;  New postop changes are identified with a large area of abnormal enhancement seen involving the left frontal cortical subcortical region with increased surrounding edema mass effect on left lateral ventricle left-to-right shift. This finding is worrisome for progression of patient's underlying disease process  EEG noted  Dexamethasone 10mg stat and 4mg bid  Neuro check  PT/OT/Speech eval passed; regular diet  rpt MRI or CTH    acute metabolic encephalopathy, Sepsis/TTP/HIT. Sepsis with klebsiella bacteremia and acute cholangitis   ICU consulted  send HIT (negative), fibrinogen (not consistent with DIC), PT/PTT, serotonin releasing assay  Platelet x 3 units  d/c ed heparin SQ, ASA  Blood culture, ua, ucx sent 2/6-GNR   ID consulted-amikacin x 1. now on meropenem.     Elevated LFTs. Transaminitis with CBD stone   AST/ALT 1207/1231  New abnormality. No history of ETOH, drug use, recent change in medications  Acute hepatitis panel   GI consulted- Dr Singh  monitor LFT.  Biliary consult/Dr. Herrera/Reiders; ERCP is not emergent  IR consulted and recommended higher level of care and any intervention in similar setting.     NSCLC metastatic to brain.   ·  Plan: NSCLC with known brain metastases, S/P XRT and chemotherapy at [facility] lung cancer S/P left craniotomy and resection of mass in 09/2021  On Tagrisso 80mg daily-  to bring home medication  Hematology/oncology consulted- Dr Sorenson.  Routine EEG with left sided dysfunction, no epileptogenic discharge  Continue with  Decadron 4mg BID for Brain edema    Mild intermittent asthma. not in exacerbation. albuterol prn.    Patient with worsening thrombocytopenia high risk for spontaneous bleed. s/p 3u PLT transfusion.   I discussed patient with Dr Oglesby at [facility] who accepted the patient for further management. Updated patient and her  on phone.     DC time spent 46 mins 44 years old female with h/o asthma, NSCLC with known brain metastases, S/P XRT and chemotherapy at [facility] lung cancer S/P left craniotomy and resection of mass in 09/2021 (Dr Parviz Paul) present to ED with slurry speech started at 6AM, last known normal was 4AM  Tachycardic to 120s ( per patient, baseline HR in 120), BP stable, afebrile sat well at RA. WBC 13.36, K 3.5, Cr 1.17, AST/ALT 1207/1231, lipase normal, CK 78. CT head with Interval decompressive left frontotemporal craniectomy with decreased mass effect and resolved rightward midline shift secondary to significant left frontal vasogenic edema. No acute intracranial hemorrhage, extra-axial collection or new suspicious region of vasogenic edema. CTA head/neck with no vaso-occlusive disease. CT perfusion-26 mL of tissue with elevated time to maximum in the left superior medial frontal lobe, likely representing chronic posttreatment changes. Patient will be discharged home with homecare. Pt to follow up at [facility] for further care.       Brain mass lesion w/surrounding edema   present with slurry speech and slight right leg weakness  CT head with Interval decompressive left frontotemporal craniectomy with decreased mass effect and resolved rightward midline shift secondary to significant left frontal vasogenic edema. No acute intracranial hemorrhage, extra-axial collection or new suspicious region of vasogenic edema. CTA head/neck with no vaso-occlusive disease. CT perfusion-26 mL of tissue with elevated time to maximum in the left superior medial frontal lobe, likely representing chronic posttreatment changes  Neurology recs apprec  Cont decadron.   No statin due to elevated LFT  No aspirin due to thrombocytopenia.   MRI brain with and without contrast ;  New postop changes are identified with a large area of abnormal enhancement seen involving the left frontal cortical subcortical region with increased surrounding edema mass effect on left lateral ventricle left-to-right shift. This finding is worrisome for progression of patient's underlying disease process  EEG noted  PT/OT/Speech eval passed; regular diet  2/6 CTH- no bleed  c/w decadron, depakon    acute metabolic encephalopathy with sepsis with acute cholangitis with klebsiella bacteremia.    HIT Ab negative, Fibrinogen not indicative of DIC  s/p platelet and blood transfusions. H and H stable.   s/p IV abx  Rpt blood cx - Neg.    Elevated LFTs. Transaminitis with CBD stone, cholangitis  Mild intrahepatic biliary duct dilatation.   -Percutaneous biliary drain in the context of severe thrombocytopenia refractory to platelet transfusions is at-least moderate risk for liver hemorrhage. Consider ERCP biliary drain as first-line. Otherwise IR drain would need to be done at tertiary center in-case a hemorrhage complication requires emergent angiographic treatment  No history of ETOH, drug use, recent change in medications  Acute hepatitis panel Neg  GI on board  reviewed ID and IR notes.   My colleague discussed with Dr Oglesby at [facility] about this patient and she has accepted the patient for higher level of care but unfortunately still pending surgical bed availability. Pt appears to be stable. Pt was treated for cholangitis. Pt refused further blood draws.     NSCLC metastatic to brain.   ·  Plan: NSCLC with known brain metastases, S/P XRT and chemotherapy at [facility] lung cancer S/P left craniotomy and resection of mass in 09/2021  On Tagrisso 80mg daily at home.  Hematology/oncology on board.  Routine EEG with left sided dysfunction, no epileptogenic discharge  Continue with  Decadron 4mg BID for Brain edema. Will be continued as oral.  No need for Aspirin at this time    Mild intermittent asthma. Not in exacerbation. albuterol prn.      Anemia. no active gross bleeding reported. FOBT is negative.     Full Code  [facility]: Patient has been pending auth for several weeks with no surgical bed availability.  Patient will be discharged home with homecare. Pt to follow up with [facility] as outpatient for further care.

## 2022-02-07 NOTE — DISCHARGE NOTE PROVIDER - NSDCMRMEDTOKEN_GEN_ALL_CORE_FT
albuterol 0.63 mg/3 mL (0.021%) inhalation solution: 3 milliliter(s) inhaled 3 times a day  dexamethasone 6 mg/25 mL-NaCl 0.9% intravenous solution: 4 milligram(s) intravenous 2 times a day  metoprolol tartrate 1 mg/mL injectable solution: 5 milligram(s) injectable every 6 hours  montelukast 5 mg oral tablet, chewable: 1 tab(s) orally once a day  piperacillin-tazobactam: 3.375 gram(s) intravenous every 8 hours  prochlorperazine 10 mg oral tablet: 1 tab(s) orally every 6 hours, As needed, nausea, vomiting  Tagrisso 80 mg oral tablet: 1 tab(s) orally once a day  valproic acid 250 mg oral capsule: 1 cap(s) orally 2 times a day   albuterol 0.63 mg/3 mL (0.021%) inhalation solution: 3 milliliter(s) inhaled 3 times a day  dexamethasone 6 mg/25 mL-NaCl 0.9% intravenous solution: 4 milligram(s) intravenous 2 times a day  metoprolol tartrate 1 mg/mL injectable solution: 5 milligram(s) injectable every 6 hours  montelukast 5 mg oral tablet, chewable: 1 tab(s) orally once a day  pantoprazole 40 mg oral delayed release tablet: 1 tab(s) orally once a day (before a meal)  prochlorperazine 10 mg oral tablet: 1 tab(s) orally every 6 hours, As needed, nausea, vomiting  Tagrisso 80 mg oral tablet: 1 tab(s) orally once a day  valproic acid 250 mg oral capsule: 1 cap(s) orally 2 times a day

## 2022-02-08 LAB
ALBUMIN SERPL ELPH-MCNC: 1.9 G/DL — LOW (ref 3.3–5)
ALP SERPL-CCNC: 247 U/L — HIGH (ref 40–120)
ALT FLD-CCNC: 120 U/L — HIGH (ref 12–78)
ANA TITR SER: NEGATIVE — SIGNIFICANT CHANGE UP
ANION GAP SERPL CALC-SCNC: 4 MMOL/L — LOW (ref 5–17)
ANISOCYTOSIS BLD QL: SLIGHT — SIGNIFICANT CHANGE UP
AST SERPL-CCNC: 86 U/L — HIGH (ref 15–37)
BASOPHILS # BLD AUTO: 0.29 K/UL — HIGH (ref 0–0.2)
BASOPHILS NFR BLD AUTO: 1 % — SIGNIFICANT CHANGE UP (ref 0–2)
BILIRUB DIRECT SERPL-MCNC: 4.1 MG/DL — HIGH (ref 0–0.3)
BILIRUB INDIRECT FLD-MCNC: 1.4 MG/DL — HIGH (ref 0.2–1)
BILIRUB SERPL-MCNC: 5.5 MG/DL — HIGH (ref 0.2–1.2)
BUN SERPL-MCNC: 32 MG/DL — HIGH (ref 7–23)
BURR CELLS BLD QL SMEAR: SLIGHT — SIGNIFICANT CHANGE UP
CALCIUM SERPL-MCNC: 8.1 MG/DL — LOW (ref 8.5–10.1)
CHLORIDE SERPL-SCNC: 119 MMOL/L — HIGH (ref 96–108)
CO2 SERPL-SCNC: 24 MMOL/L — SIGNIFICANT CHANGE UP (ref 22–31)
CREAT SERPL-MCNC: 0.6 MG/DL — SIGNIFICANT CHANGE UP (ref 0.5–1.3)
DOHLE BOD BLD QL SMEAR: PRESENT — SIGNIFICANT CHANGE UP
ELLIPTOCYTES BLD QL SMEAR: SLIGHT — SIGNIFICANT CHANGE UP
EOSINOPHIL # BLD AUTO: 0 K/UL — SIGNIFICANT CHANGE UP (ref 0–0.5)
EOSINOPHIL NFR BLD AUTO: 0 % — SIGNIFICANT CHANGE UP (ref 0–6)
GLUCOSE BLDC GLUCOMTR-MCNC: 114 MG/DL — HIGH (ref 70–99)
GLUCOSE BLDC GLUCOMTR-MCNC: 124 MG/DL — HIGH (ref 70–99)
GLUCOSE BLDC GLUCOMTR-MCNC: 127 MG/DL — HIGH (ref 70–99)
GLUCOSE SERPL-MCNC: 130 MG/DL — HIGH (ref 70–99)
GRAM STN FLD: SIGNIFICANT CHANGE UP
GRAM STN FLD: SIGNIFICANT CHANGE UP
HCT VFR BLD CALC: 28.8 % — LOW (ref 34.5–45)
HGB BLD-MCNC: 9.9 G/DL — LOW (ref 11.5–15.5)
HYPOCHROMIA BLD QL: SLIGHT — SIGNIFICANT CHANGE UP
LYMPHOCYTES # BLD AUTO: 0.86 K/UL — LOW (ref 1–3.3)
LYMPHOCYTES # BLD AUTO: 3 % — LOW (ref 13–44)
MANUAL SMEAR VERIFICATION: SIGNIFICANT CHANGE UP
MCHC RBC-ENTMCNC: 29.2 PG — SIGNIFICANT CHANGE UP (ref 27–34)
MCHC RBC-ENTMCNC: 34.4 G/DL — SIGNIFICANT CHANGE UP (ref 32–36)
MCV RBC AUTO: 85 FL — SIGNIFICANT CHANGE UP (ref 80–100)
METAMYELOCYTES # FLD: 2 % — HIGH (ref 0–0)
MICROCYTES BLD QL: SLIGHT — SIGNIFICANT CHANGE UP
MONOCYTES # BLD AUTO: 1.15 K/UL — HIGH (ref 0–0.9)
MONOCYTES NFR BLD AUTO: 4 % — SIGNIFICANT CHANGE UP (ref 2–14)
MYELOCYTES NFR BLD: 1 % — HIGH (ref 0–0)
NEUTROPHILS # BLD AUTO: 25.65 K/UL — HIGH (ref 1.8–7.4)
NEUTROPHILS NFR BLD AUTO: 86 % — HIGH (ref 43–77)
NEUTS BAND # BLD: 3 % — SIGNIFICANT CHANGE UP (ref 0–8)
NRBC # BLD: 0 /100 — SIGNIFICANT CHANGE UP (ref 0–0)
NRBC # BLD: SIGNIFICANT CHANGE UP /100 WBCS (ref 0–0)
OVALOCYTES BLD QL SMEAR: SLIGHT — SIGNIFICANT CHANGE UP
PLAT MORPH BLD: NORMAL — SIGNIFICANT CHANGE UP
PLATELET # BLD AUTO: 29 K/UL — LOW (ref 150–400)
PLATELET COUNT - ESTIMATE: ABNORMAL
POIKILOCYTOSIS BLD QL AUTO: SLIGHT — SIGNIFICANT CHANGE UP
POLYCHROMASIA BLD QL SMEAR: SLIGHT — SIGNIFICANT CHANGE UP
POTASSIUM SERPL-MCNC: 3.4 MMOL/L — LOW (ref 3.5–5.3)
POTASSIUM SERPL-SCNC: 3.4 MMOL/L — LOW (ref 3.5–5.3)
PROT SERPL-MCNC: 6.6 GM/DL — SIGNIFICANT CHANGE UP (ref 6–8.3)
RBC # BLD: 3.39 M/UL — LOW (ref 3.8–5.2)
RBC # BLD: 3.39 M/UL — LOW (ref 3.8–5.2)
RBC # FLD: 17.2 % — HIGH (ref 10.3–14.5)
RBC BLD AUTO: ABNORMAL
RETICS #: 9.1 K/UL — LOW (ref 25–125)
RETICS/RBC NFR: 0.3 % — LOW (ref 0.5–2.5)
SCHISTOCYTES BLD QL AUTO: SLIGHT — SIGNIFICANT CHANGE UP
SMUDGE CELLS # BLD: PRESENT — SIGNIFICANT CHANGE UP
SODIUM SERPL-SCNC: 147 MMOL/L — HIGH (ref 135–145)
SPECIMEN SOURCE: SIGNIFICANT CHANGE UP
SPECIMEN SOURCE: SIGNIFICANT CHANGE UP
TARGETS BLD QL SMEAR: SLIGHT — SIGNIFICANT CHANGE UP
WBC # BLD: 28.82 K/UL — HIGH (ref 3.8–10.5)
WBC # FLD AUTO: 28.82 K/UL — HIGH (ref 3.8–10.5)

## 2022-02-08 PROCEDURE — 76700 US EXAM ABDOM COMPLETE: CPT | Mod: 26,59

## 2022-02-08 PROCEDURE — 99239 HOSP IP/OBS DSCHRG MGMT >30: CPT

## 2022-02-08 PROCEDURE — 99233 SBSQ HOSP IP/OBS HIGH 50: CPT

## 2022-02-08 RX ORDER — POTASSIUM CHLORIDE 20 MEQ
10 PACKET (EA) ORAL ONCE
Refills: 0 | Status: COMPLETED | OUTPATIENT
Start: 2022-02-08 | End: 2022-02-08

## 2022-02-08 RX ADMIN — MONTELUKAST 5 MILLIGRAM(S): 4 TABLET, CHEWABLE ORAL at 11:56

## 2022-02-08 RX ADMIN — SODIUM CHLORIDE 100 MILLILITER(S): 9 INJECTION INTRAMUSCULAR; INTRAVENOUS; SUBCUTANEOUS at 17:47

## 2022-02-08 RX ADMIN — Medication 250 MILLIGRAM(S): at 17:48

## 2022-02-08 RX ADMIN — MEROPENEM 100 MILLIGRAM(S): 1 INJECTION INTRAVENOUS at 21:18

## 2022-02-08 RX ADMIN — SODIUM CHLORIDE 100 MILLILITER(S): 9 INJECTION INTRAMUSCULAR; INTRAVENOUS; SUBCUTANEOUS at 04:06

## 2022-02-08 RX ADMIN — Medication 100 MILLIEQUIVALENT(S): at 16:35

## 2022-02-08 RX ADMIN — Medication 4 MILLIGRAM(S): at 05:23

## 2022-02-08 RX ADMIN — Medication 250 MILLIGRAM(S): at 05:23

## 2022-02-08 RX ADMIN — MEROPENEM 100 MILLIGRAM(S): 1 INJECTION INTRAVENOUS at 13:33

## 2022-02-08 RX ADMIN — Medication 5 MILLIGRAM(S): at 05:23

## 2022-02-08 RX ADMIN — Medication 5 MILLIGRAM(S): at 00:50

## 2022-02-08 RX ADMIN — Medication 5 MILLIGRAM(S): at 17:48

## 2022-02-08 RX ADMIN — Medication 4 MILLIGRAM(S): at 17:48

## 2022-02-08 RX ADMIN — MEROPENEM 100 MILLIGRAM(S): 1 INJECTION INTRAVENOUS at 05:21

## 2022-02-08 NOTE — PROGRESS NOTE ADULT - ASSESSMENT
44 years old female with h/o asthma, NSCLC with known brain metastases, S/P XRT and chemotherapy at [facility] lung cancer S/P left craniotomy and resection of mass in 09/2021 (Dr Parviz Paul) present to ED with slurry speech started at 6AM, last known normal was 4AM.  Reported 1-2 episode of vomiting last night. Per , patient has slight weakness on right leg.   Tachycardic to 120s.   CT head with Interval decompressive left frontotemporal craniectomy with decreased mass effect and resolved rightward midline shift secondary to significant left frontal vasogenic edema. No acute intracranial hemorrhage, extra-axial collection or new suspicious region of vasogenic edema.   CTA head/neck with no vaso-occlusive disease. CT perfusion-26 mL of tissue with elevated time to maximum in the left superior medial frontal lobe, likely representing chronic posttreatment changes. Not a candidate for TPA.   CT (I personally reviewed) possible acute cholangitis   blood cultures positive with enterobacter   high bili, thrombocytopenia, leukocytosis  MRCP small 6mm stone    she is on chemo as well radiation     2/8: afebrile, on RA, WBC high 28.82, BC growing enterobacter (not cloacae) isolated by PCR and 1/4 with Klebsiella aerogenes. Two new sets of BCs with gram negative rods, ID pending, two more sets are pending. The pt is s/p one dose of Amikacin yesterday, on IV Meropenem. Abd exam with diffuse tenderness.      sepsis due to cholangitis   leukocytosis   thrombocytopenia     Plan:  s/p one dose of amikacin 900mg on 2/7  continue meropenem 1g q8hrs until sensitivities are returned  no need to add additional anaerobic coverage as meropenem will cover   patient needs ERCP but deemed not urgent  if ERCP is done please send cultures   follow blood cultures ID and sensitivities   repeat blood cultures are pending   trend platelets and transfuse as needed   maintain active type and screen   trend wbc   avoid any further doses of chemotherapy at this time during an active infection  44 years old female with h/o asthma, NSCLC with known brain metastases, S/P XRT and chemotherapy at [facility] lung cancer S/P left craniotomy and resection of mass in 09/2021 (Dr Parviz Paul) present to ED with slurry speech started at 6AM, last known normal was 4AM.  Reported 1-2 episode of vomiting last night. Per , patient has slight weakness on right leg.   Tachycardic to 120s.   CT head with Interval decompressive left frontotemporal craniectomy with decreased mass effect and resolved rightward midline shift secondary to significant left frontal vasogenic edema. No acute intracranial hemorrhage, extra-axial collection or new suspicious region of vasogenic edema.   CTA head/neck with no vaso-occlusive disease. CT perfusion-26 mL of tissue with elevated time to maximum in the left superior medial frontal lobe, likely representing chronic posttreatment changes. Not a candidate for TPA.   CT (I personally reviewed) possible acute cholangitis   blood cultures positive with enterobacter   high bili, thrombocytopenia, leukocytosis  MRCP small 6mm stone    she is on chemo as well radiation     2/8: afebrile, on RA, WBC high 28.82, BC growing enterobacter (not cloacae) isolated by PCR and 1/4 with Klebsiella aerogenes. Two new sets of BCs with gram negative rods, ID pending, two more sets are pending. The pt is s/p one dose of Amikacin yesterday, on IV Meropenem. Abd exam with diffuse tenderness.      sepsis due to cholangitis   leukocytosis   thrombocytopenia     Plan:  s/p one dose of amikacin 900mg on 2/7  continue meropenem 1g q8hrs until sensitivities are returned  no need to add additional anaerobic coverage as meropenem will cover   patient needs ERCP but deemed not urgent  if ERCP is done please send cultures   follow blood cultures ID and sensitivities   transfer to tertiary care center  if long delay in transfer, IR consult for percutaneous cholecystotomy   repeat blood cultures are pending   trend platelets and transfuse as needed   maintain active type and screen   trend wbc   avoid any further doses of chemotherapy at this time during an active infection

## 2022-02-08 NOTE — PROGRESS NOTE ADULT - SUBJECTIVE AND OBJECTIVE BOX
VENANCIO CHAIREZ  MRN-[id number]    Follow Up:  acute cholangitis     Interval History: The pt was seen and examined earlier, no distress, no new complains. The pt is afebrile, on RA, WBC still high 28.82.     PAST MEDICAL & SURGICAL HISTORY:  No pertinent past medical history    Malignant neoplasm of lung, unspecified laterality, unspecified part of lung    Gastroesophageal reflux disease, esophagitis presence not specified    Asthma, unspecified asthma severity, unspecified whether complicated, unspecified whether persistent    H/O craniotomy    Brain mass        ROS:    [ ] Unobtainable because:  [x ] All other systems negative    Constitutional: no fever, no chills  Head: no trauma  Eyes: no vision changes, no eye pain  ENT:  no sore throat, no rhinorrhea  Cardiovascular:  no chest pain, no palpitation  Respiratory:  no SOB, no cough  GI:  + abd pain, no vomiting, no diarrhea  urinary: no dysuria, no hematuria, no flank pain  musculoskeletal:  no joint pain, no joint swelling  skin:  no rash  neurology:  no headache, no seizure, no change in mental status  psych: no anxiety, no depression         Allergies  codeine (Other)  latex (Rash)        ANTIMICROBIALS:  meropenem  IVPB 1000 every 8 hours      OTHER MEDS:  dexAMETHasone  Injectable 4 milliGRAM(s) IV Push two times a day  dextrose 40% Gel 15 Gram(s) Oral once  dextrose 50% Injectable 25 Gram(s) IV Push once  dextrose 50% Injectable 12.5 Gram(s) IV Push once  dextrose 50% Injectable 25 Gram(s) IV Push once  glucagon  Injectable 1 milliGRAM(s) IntraMuscular once  melatonin 3 milliGRAM(s) Oral at bedtime PRN  metoprolol tartrate Injectable 5 milliGRAM(s) IV Push every 6 hours  montelukast  Chewable 5 milliGRAM(s) Oral daily  potassium chloride  10 mEq/100 mL IVPB 10 milliEquivalent(s) IV Intermittent once  prochlorperazine   Tablet 10 milliGRAM(s) Oral every 6 hours PRN  sodium chloride 0.9%. 1000 milliLiter(s) IV Continuous <Continuous>  valproic acid 250 milliGRAM(s) Oral two times a day      Vital Signs Last 24 Hrs  T(C): 36.8 (2022 16:05), Max: 36.8 (2022 16:05)  T(F): 98.3 (2022 16:05), Max: 98.3 (2022 16:05)  HR: 98 (2022 16:05) (51 - 108)  BP: 127/87 (2022 16:05) (114/68 - 134/87)  BP(mean): --  RR: 18 (2022 16:05) (18 - 20)  SpO2: 99% (2022 16:05) (97% - 99%)    Physical Exam:  Constitutional: non-toxic, no distress  HEAD/EYES: anicteric, no conjunctival injection, scleral icterus   ENT:  supple, no thrush  Cardiovascular:   normal S1, S2, no murmur, no edema  Respiratory:  clear BS bilaterally, no wheezes, no rales  GI:  soft, diffusely tender though worse in the RUQ, normal bowel sounds  :  no santiago, no CVA tenderness  Musculoskeletal:  no synovitis, normal ROM  Neurologic: awake and alert but lethargic, at times does not want to answer questions  Skin:  no rash, no erythema, no phlebitis  Heme/Onc: cervical no lymphadenopathy   Psychiatric:  awake, alert, appropriate mood    WBC Count: 28.82 K/uL ( @ 08:02)  WBC Count: 27.06 K/uL ( @ 19:19)  WBC Count: 30.10 K/uL ( @ 08:37)  WBC Count: 27.59 K/uL ( @ 22:00)  WBC Count: 25.27 K/uL ( @ 12:37)  WBC Count: 24.12 K/uL ( @ 10:52)  WBC Count: 6.05 K/uL ( @ 10:14)                            9.9    28.82 )-----------( 29       ( 2022 08:02 )             28.8           147<H>  |  119<H>  |  32<H>  ----------------------------<  130<H>  3.4<L>   |  24  |  0.60    Ca    8.1<L>      2022 08:02    TPro  6.6  /  Alb  1.9<L>  /  TBili  5.5<H>  /  DBili  4.1<H>  /  AST  86<H>  /  ALT  120<H>  /  AlkPhos  247<H>        Urinalysis Basic - ( 2022 01:35 )    Color: Yellow / Appearance: Clear / S.010 / pH: x  Gluc: x / Ketone: Trace  / Bili: Large / Urobili: 4 mg/dL   Blood: x / Protein: 100 mg/dL / Nitrite: Negative   Leuk Esterase: Small / RBC: 6-10 /HPF / WBC 6-10   Sq Epi: x / Non Sq Epi: Few / Bacteria: Moderate        Creatinine Trend: 0.60<--, 0.63<--, 0.85<--, 0.84<--, 0.76<--, 0.85<--      MICROBIOLOGY:  v  .Blood Blood-Peripheral  22   Growth in aerobic bottle:  Gram Negative Rods  Growth in anaerobic bottle: Gram Negative Rods  --    Growth in aerobic bottle:  Gram Negative Rods  Growth in anaerobic bottle: Gram Negative Rods      Clean Catch Clean Catch (Midstream)  22   10,000 - 49,000 CFU/mL Gram Negative Rods  --  --      .Blood Blood-Peripheral  22   Growth in aerobic and anaerobic bottles: Klebsiella aerogenes  See previous culture [id number]  --  Blood Culture PCR      Clean Catch Clean Catch (Midstream)  22   >100,000 CFU/ml Proteus mirabilis  <10,000 CFU/ml Normal Urogenital manish present  --  Proteus mirabilis    Ferritin, Serum: 930 ()    SARS-CoV-2 Result: NotDetec (22 @ 14:30)    COVID-19 PCR: NotDetec (03 Sep 2021 22:43)    RADIOLOGY:     VENANCIO CHAIREZ  MRN-[id number]    Follow Up:  acute cholangitis     Interval History: The pt was seen and examined earlier, no distress, no new complains. The pt is afebrile, on RA, WBC still high 28.82, bacteremic and still has abdominal pain    PAST MEDICAL & SURGICAL HISTORY:  No pertinent past medical history    Malignant neoplasm of lung, unspecified laterality, unspecified part of lung    Gastroesophageal reflux disease, esophagitis presence not specified    Asthma, unspecified asthma severity, unspecified whether complicated, unspecified whether persistent    H/O craniotomy    Brain mass        ROS:    [ ] Unobtainable because:  [x ] All other systems negative    Constitutional: no fever, no chills  Head: no trauma  Eyes: no vision changes, no eye pain  ENT:  no sore throat, no rhinorrhea  Cardiovascular:  no chest pain, no palpitation  Respiratory:  no SOB, no cough  GI:  + abd pain, no vomiting, no diarrhea  urinary: no dysuria, no hematuria, no flank pain  musculoskeletal:  no joint pain, no joint swelling  skin:  no rash  neurology:  no headache, no seizure, no change in mental status  psych: no anxiety, no depression         Allergies  codeine (Other)  latex (Rash)        ANTIMICROBIALS:  meropenem  IVPB 1000 every 8 hours      OTHER MEDS:  dexAMETHasone  Injectable 4 milliGRAM(s) IV Push two times a day  dextrose 40% Gel 15 Gram(s) Oral once  dextrose 50% Injectable 25 Gram(s) IV Push once  dextrose 50% Injectable 12.5 Gram(s) IV Push once  dextrose 50% Injectable 25 Gram(s) IV Push once  glucagon  Injectable 1 milliGRAM(s) IntraMuscular once  melatonin 3 milliGRAM(s) Oral at bedtime PRN  metoprolol tartrate Injectable 5 milliGRAM(s) IV Push every 6 hours  montelukast  Chewable 5 milliGRAM(s) Oral daily  potassium chloride  10 mEq/100 mL IVPB 10 milliEquivalent(s) IV Intermittent once  prochlorperazine   Tablet 10 milliGRAM(s) Oral every 6 hours PRN  sodium chloride 0.9%. 1000 milliLiter(s) IV Continuous <Continuous>  valproic acid 250 milliGRAM(s) Oral two times a day      Vital Signs Last 24 Hrs  T(C): 36.8 (2022 16:05), Max: 36.8 (2022 16:05)  T(F): 98.3 (2022 16:05), Max: 98.3 (2022 16:05)  HR: 98 (2022 16:05) (51 - 108)  BP: 127/87 (2022 16:05) (114/68 - 134/87)  BP(mean): --  RR: 18 (2022 16:05) (18 - 20)  SpO2: 99% (2022 16:05) (97% - 99%)    Physical Exam:  Constitutional: non-toxic, no distress  HEAD/EYES: anicteric, no conjunctival injection, scleral icterus, left scalp deformity with large scar due to prior craniotomy and brain surgery  ENT:  supple, no thrush  Cardiovascular:   normal S1, S2, no murmur, no edema  Respiratory:  clear BS bilaterally, no wheezes, no rales  GI:  soft, diffusely tender though worse in the RUQ, normal bowel sounds  :  no santiago, no CVA tenderness  Musculoskeletal:  no synovitis, normal ROM  Neurologic: awake and alert but lethargic, at times does not want to answer questions  Skin:  no rash, no erythema, no phlebitis  Heme/Onc: cervical no lymphadenopathy   Psychiatric:  awake, alert, appropriate mood    WBC Count: 28.82 K/uL ( @ 08:02)  WBC Count: 27.06 K/uL ( @ 19:19)  WBC Count: 30.10 K/uL ( @ 08:37)  WBC Count: 27.59 K/uL ( @ 22:00)  WBC Count: 25.27 K/uL ( @ 12:37)  WBC Count: 24.12 K/uL ( @ 10:52)  WBC Count: 6.05 K/uL ( @ 10:14)                            9.9    28.82 )-----------( 29       ( 2022 08:02 )             28.8           147<H>  |  119<H>  |  32<H>  ----------------------------<  130<H>  3.4<L>   |  24  |  0.60    Ca    8.1<L>      2022 08:02    TPro  6.6  /  Alb  1.9<L>  /  TBili  5.5<H>  /  DBili  4.1<H>  /  AST  86<H>  /  ALT  120<H>  /  AlkPhos  247<H>        Urinalysis Basic - ( 2022 01:35 )    Color: Yellow / Appearance: Clear / S.010 / pH: x  Gluc: x / Ketone: Trace  / Bili: Large / Urobili: 4 mg/dL   Blood: x / Protein: 100 mg/dL / Nitrite: Negative   Leuk Esterase: Small / RBC: 6-10 /HPF / WBC 6-10   Sq Epi: x / Non Sq Epi: Few / Bacteria: Moderate        Creatinine Trend: 0.60<--, 0.63<--, 0.85<--, 0.84<--, 0.76<--, 0.85<--      MICROBIOLOGY:  v  .Blood Blood-Peripheral  22   Growth in aerobic bottle:  Gram Negative Rods  Growth in anaerobic bottle: Gram Negative Rods  --    Growth in aerobic bottle:  Gram Negative Rods  Growth in anaerobic bottle: Gram Negative Rods      Clean Catch Clean Catch (Midstream)  22   10,000 - 49,000 CFU/mL Gram Negative Rods  --  --      .Blood Blood-Peripheral  22   Growth in aerobic and anaerobic bottles: Klebsiella aerogenes  See previous culture [id number]  --  Blood Culture PCR      Clean Catch Clean Catch (Midstream)  22   >100,000 CFU/ml Proteus mirabilis  <10,000 CFU/ml Normal Urogenital manish present  --  Proteus mirabilis    Ferritin, Serum: 930 ()    SARS-CoV-2 Result: NotDetec (22 @ 14:30)    COVID-19 PCR: NotDetec (03 Sep 2021 22:43)    RADIOLOGY:

## 2022-02-08 NOTE — PROGRESS NOTE ADULT - PROBLEM SELECTOR PLAN 1
- for Transfer to [facility], for higher level of care  - Watch blood counts and platelet counts - transfuse as necessary  - Abx per ID  - Decadron  - HIT Ab negative, Fibrinogen not indicative of DIC  - Smear reviewed 2/6 and 2/7 - no evidence of MAHA  - GI f/u

## 2022-02-08 NOTE — CONSULT NOTE ADULT - ASSESSMENT
Interventional Radiology  Evaluate for Procedure:     HPI: 47y Female with sepsis.    Allergies: codeine (Other)  latex (Rash)    Medications (Abx/Cardiac/Anticoagulation/Blood Products)  amiKACIN  IVPB: 253.6 mL/Hr IV Intermittent (02-07 @ 15:24)  meropenem  IVPB: 100 mL/Hr IV Intermittent (02-08 @ 13:33)  metoprolol tartrate Injectable: 5 milliGRAM(s) IV Push (02-08 @ 05:23)  piperacillin/tazobactam IVPB..: 25 mL/Hr IV Intermittent (02-07 @ 21:17)    Data:  T(C): 36.2  HR: 100  BP: 119/82  RR: 19  SpO2: 98%    -WBC 28.82 / HgB 9.9 / Hct 28.8 / Plt 29  -Na 147 / Cl 119 / BUN 32 / Glucose 130  -K 3.4 / CO2 24 / Cr 0.60  - / Alk Phos 247 / T.Bili 5.5  -INR 1.62 / PTT 29.7    Radiology: moderate CBD dilatation, mild intrahepatic dilatation    Assessment/Plan:   -47y Female with sepsis, possibly cholangitis.  -Mild intrahepatic biliary duct dilatation.   -Percutaneous biliary drain in the context of severe thrombocytopenia refractory to platelet transfusions is at-least moderate risk for liver hemorrhage. Consider ERCP biliary drain as first-line. Otherwise IR drain would need to be done at tertiary center in-case a hemorrhage complication requires emergent angiographic treatment. d/w Dr Gerardo
44 years old female with h/o asthma, NSCLC with known brain metastases, S/P XRT and chemotherapy at [facility] lung cancer S/P left craniotomy and resection of mass in 09/2021 (Dr Parviz Paul) present to ED with slurry speech started at 6AM, last known normal was 4AM.  Reported 1-2 episode of vomiting last night. Per , patient has slight weakness on right leg.   Tachycardic to 120s.   CT head with Interval decompressive left frontotemporal craniectomy with decreased mass effect and resolved rightward midline shift secondary to significant left frontal vasogenic edema. No acute intracranial hemorrhage, extra-axial collection or new suspicious region of vasogenic edema.   CTA head/neck with no vaso-occlusive disease. CT perfusion-26 mL of tissue with elevated time to maximum in the left superior medial frontal lobe, likely representing chronic posttreatment changes. Not a candidate for TPA.   CT (I personally reviewed) possible acute cholangitis   blood cultures positive with enterobacter   high bili, thrombocytopenia, leukocytosis  MRCP small 6mm stone    she is on chemo as well radiation     sepsis due to cholangitis   leukocytosis   thrombocytopenia     Plan:  s/p one dose of amikacin 900mg on 2.7  stop zosyn  start meropenem 1g q8hrs until sensitivities are returned  no need to add additional anaerobic coverage as meropenem will cover   patient needs ERCP but deemed not urgent  if ERCP is done please send cultures   follow blood cultures ID and sensitivities   repeat blood cultures   trend platelets and transfuse as needed   maintain active type and screen   trend wbc   avoid any further doses of chemotherapy at this time during an active infection     D/W Dr. Akin Giraldo, DO  Infectious Disease Attending  Reachable via Microsoft Teams or ID office: [phone]  After 5pm/weekends please call [phone] for all inquiries and new consults           
NSCLC
47 year old female with a PMH of stage 4 NSCLC s/p XRT / chemo at [facility], and s/p left craniotomy with mass resection (reportedly benign) in 9/2021 also at [facility] with Dr. Parviz Paul complicated by surgical site / bone infection now pt awaiting cranioplasty (has helmet - not brought in to hospital), who was admitted to [facility] on 2/2 for acute onset slurred speech and right side weakness / drift. Hospital course also complicated by increased t. bili and LFT's; dilated biliary duct and intrahepatic duct, with 6 mm biliary duct stone noted on MRCP, however, the galbladder is not distended. Earlier today pt noted with increased lethargy / AMS, and thrombocytopenia. ICU team consulted.    Patient has been seen and examined at her bedside by ICU team. Pt daughter with patient and they were just having an active discussion and pt drank ensure. She is protecting her airway. Pt does not c/o of HA. VSS. She is not currently an ICU candidate, however we are recommending the following:    --monitor vitals, o2 sat, neuro checks  --Stat CT head for eval for bleeding and / or any changes  --hospitalist ordered 1 unit PLT. DVT prophylaxis and aspirin held; trend thrombocytopenia  --continue decadron  --consider transfer to to physician at [facility] or [facility] if needed  --consider HIT?; send panel?  --contact neurologist    If any changes occur or concern with CT head results please contact ICU team back immediately, thank you!
48 yo F hx stage 4 NSCLC s/p XRT and chemotherapy at [facility], s/p L craniotomy and resection of mass in 2021, presenting with slurred speech. On PE advanced psychomotor slowing poor attention RUE drift. CTH s/p le crani with l frontal vasogenic edema CTA no acute findings    Impression: findings likely 2/2 mass lesion, unclear if progression since rexn, r/o post-ictal seizure    MRI brain with and w/o con  Routine EEG  Consider Decadron 10 mg x 1 followed by Decadron 4mg BID, reevlauate after MRI  Continue with home Keppra  Aspirin 81 for now, can discontinue if no CVA on MRI
HPI:  44 years old female with h/o asthma, NSCLC with known brain metastases, S/P XRT and chemotherapy at [facility] lung cancer S/P left craniotomy and resection of mass in 09/2021 (Dr Parviz Paul) present to ED with slurry speech started at 6AM, last known normal was 4AM.  Reported 1-2 episode of vomiting last night. Per , patient has slight weakness on right leg.   Tachycardic to 120s ( per patient, baseline HR in 120), BP stable, afebrile sat well at RA. WBC 13.36, K 3.5, Cr 1.17, AST/ALT 1207/1231, lipase normal, CK 78. CT head with Interval decompressive left frontotemporal craniectomy with decreased mass effect and resolved rightward midline shift secondary to significant left frontal vasogenic edema. No acute intracranial hemorrhage, extra-axial collection or new suspicious region of vasogenic edema. CTA head/neck with no vaso-occlusive disease. CT perfusion-26 mL of tissue with elevated time to maximum in the left superior medial frontal lobe, likely representing chronic posttreatment changes. Not a candidate for TPA.   ----- As Above ----- History obtained from  / MD / chart  The patient had been doing well until early this AM when she was noted to have a change in mentation and weakness. Neurological w/u in progress.   Called to see patient regarding elevated LFTs. New abnormality. No history of ETOH, drug use, recent change in medications, hepatitis or NSAIDs. Tattoo from 20 years ago. On Tagrisso since 2019.   Yesterday, patient had unexplained N/V. ? Abdominal pain.   Prior to this, the  denies melena, hematochezia, hematemesis, nausea, vomiting, abdominal pain, constipation, diarrhea, or change in bowel movements     A) Elevated LFTs- r/o hepatitis, infiltrative disease ( including mets ) biliary disease, etc. - 1) Abd / Pelvic CT scan 2) Viral hepatitis screen 3) f/u labs 4) Liver studies   B) N/V - r/o CNS, obstruction, hepatitis, PUD, etc. -- 1) Abd / Pelvic CT scan 2) NPO until CT Scan is checked and has speech evaluation 
44 years old female with h/o asthma, NSCLC with known brain metastases, S/P XRT and chemotherapy at [facility] lung cancer S/P left craniotomy and resection of mass in 09/2021   admitted with Acute CVA on IV heparin ( not a candidate for TPA)  asked to see patient for CBD stone with elevated LFTS and jaundice

## 2022-02-08 NOTE — PROGRESS NOTE ADULT - TIME BILLING
min spent reviewing chart, examining patient, discussing plan with patient and family
min spent reviewing chart, examining patient, discussing plan with patient and family
GI
min spent reviewing chart, examining patient, discussing plan with patient and family

## 2022-02-08 NOTE — PROGRESS NOTE ADULT - SUBJECTIVE AND OBJECTIVE BOX
Patient is a 47y old  Female who presents with a chief complaint of CVA (2022 09:36)      OVERNIGHT EVENTS:    REVIEW OF SYSTEMS: denies chest pain/SOB, diaphoresis, no F/C, cough, dizziness, headache, blurry vision, nausea, vomiting, abdominal pain. All others review of systems negative     MEDICATIONS  (STANDING):  dexAMETHasone  Injectable 4 milliGRAM(s) IV Push two times a day  dextrose 40% Gel 15 Gram(s) Oral once  dextrose 50% Injectable 25 Gram(s) IV Push once  dextrose 50% Injectable 12.5 Gram(s) IV Push once  dextrose 50% Injectable 25 Gram(s) IV Push once  glucagon  Injectable 1 milliGRAM(s) IntraMuscular once  meropenem  IVPB 1000 milliGRAM(s) IV Intermittent every 8 hours  metoprolol tartrate Injectable 5 milliGRAM(s) IV Push every 6 hours  montelukast  Chewable 5 milliGRAM(s) Oral daily  sodium chloride 0.9%. 1000 milliLiter(s) (100 mL/Hr) IV Continuous <Continuous>  valproic acid 250 milliGRAM(s) Oral two times a day    MEDICATIONS  (PRN):  melatonin 3 milliGRAM(s) Oral at bedtime PRN Insomnia  prochlorperazine   Tablet 10 milliGRAM(s) Oral every 6 hours PRN nausea, vomiting      Allergies    codeine (Other)  latex (Rash)    Intolerances        T(F): 97.2 (22 @ 14:30), Max: 97.8 (22 @ 15:00)  HR: 106 (22 @ 14:30) (51 - 108)  BP: 116/80 (22 @ 14:30) (113/79 - 134/87)  RR: 20 (22 @ 14:30) (18 - 20)  SpO2: 99% (22 @ 14:30) (97% - 99%)  Wt(kg): --    PHYSICAL EXAM:  GENERAL: NAD  HEAD:  Atraumatic, Normocephalic  EYES: PERRLA, conjunctiva and sclera clear  ENMT: Moist mucous membranes  NECK: Supple, No JVD, Normal thyroid  NERVOUS SYSTEM:  Alert & Awake  CHEST/LUNG: Clear to percussion bilaterally;   HEART: Regular rate and rhythm;   ABDOMEN: Soft, Nontender, Nondistended; Bowel sounds present  EXTREMITIES:  no edema BL LE  SKIN: moist    LABS:                        9.9    28.82 )-----------( 29       ( 2022 08:02 )             28.8     02-08    147<H>  |  119<H>  |  32<H>  ----------------------------<  130<H>  3.4<L>   |  24  |  0.60    Ca    8.1<L>      2022 08:02    TPro  6.6  /  Alb  1.9<L>  /  TBili  5.5<H>  /  DBili  4.1<H>  /  AST  86<H>  /  ALT  120<H>  /  AlkPhos  247<H>  02-08    PT/INR - ( 2022 14:50 )   PT: 18.4 sec;   INR: 1.62 ratio         PTT - ( 2022 14:50 )  PTT:29.7 sec  Urinalysis Basic - ( 2022 01:35 )    Color: Yellow / Appearance: Clear / S.010 / pH: x  Gluc: x / Ketone: Trace  / Bili: Large / Urobili: 4 mg/dL   Blood: x / Protein: 100 mg/dL / Nitrite: Negative   Leuk Esterase: Small / RBC: 6-10 /HPF / WBC 6-10   Sq Epi: x / Non Sq Epi: Few / Bacteria: Moderate      Cultures;   CAPILLARY BLOOD GLUCOSE      POCT Blood Glucose.: 114 mg/dL (2022 11:18)  POCT Blood Glucose.: 124 mg/dL (2022 05:19)  POCT Blood Glucose.: 131 mg/dL (2022 21:27)  POCT Blood Glucose.: 182 mg/dL (2022 17:04)    Lipid panel:           RADIOLOGY & ADDITIONAL TESTS:    Imaging Personally Reviewed:  [x ] YES      Consultant(s) Notes Reviewed:  [x ] YES     Care Discussed with [x ] Consultants [X ] Patient [ ] Family  [x ]    [x ]  Other; RN

## 2022-02-08 NOTE — PROGRESS NOTE ADULT - ASSESSMENT
44 years old female with h/o asthma, NSCLC with known brain metastases, S/P XRT and chemotherapy at [facility] lung cancer S/P left craniotomy and resection of mass in 09/2021 (Dr Parviz Paul) present to ED with slurry speech started at 6AM, last known normal was 4AM  Tachycardic to 120s ( per patient, baseline HR in 120), BP stable, afebrile sat well at RA. WBC 13.36, K 3.5, Cr 1.17, AST/ALT 1207/1231, lipase normal, CK 78. CT head with Interval decompressive left frontotemporal craniectomy with decreased mass effect and resolved rightward midline shift secondary to significant left frontal vasogenic edema. No acute intracranial hemorrhage, extra-axial collection or new suspicious region of vasogenic edema. CTA head/neck with no vaso-occlusive disease. CT perfusion-26 mL of tissue with elevated time to maximum in the left superior medial frontal lobe, likely representing chronic posttreatment changes. Not a candidate for TPA.     Admitted with suspected CVA    brain mass lesion w/surrounding edema   present with slurry speech and slight right leg weakness  Not a candidate for TPA  CT head with Interval decompressive left frontotemporal craniectomy with decreased mass effect and resolved rightward midline shift secondary to significant left frontal vasogenic edema. No acute intracranial hemorrhage, extra-axial collection or new suspicious region of vasogenic edema. CTA head/neck with no vaso-occlusive disease. CT perfusion-26 mL of tissue with elevated time to maximum in the left superior medial frontal lobe, likely representing chronic posttreatment changes  Neurology consulted  Aspirin 81mg  No statin due to elevated LFT  MRI brain with and without contrast ;  New postop changes are identified with a large area of abnormal enhancement seen involving the left frontal cortical subcortical region with increased surrounding edema mass effect on left lateral ventricle left-to-right shift. This finding is worrisome for progression of patient's underlying disease process  EEG noted  Dexamethasone 10mg stat and 4mg bid  Neuro check  PT/OT/Speech eval passed; regular diet  rpt MRI pending  2/6 CTH- no bleed, rpt as thrombocytopenia no changes    acute metabolic encephalopathy, suspect Sepsis/TTP/HIT - new mental status changes on 2/6  ICU consulted  send HIT, fibrinogen, PT/PTT, serotonin releasing assay, rpt today  s/p 2 units Platelet, give x 2u today  trend cbc  d/c heparin SQ, ASA  Blood culture, ua, ucx sent 2/7- GNR/Enterobacter   ID o/b    Elevated LFTs. Transaminitis with CBD stone   AST/ALT 1207/1231  New abnormality. No history of ETOH, drug use, recent change in medications  Acute hepatitis panel   GI consulted- Dr Singh  monitor LFT.  need MRCP for CBD stone  Biliary consult/Dr. Herrera/Reiders; ERCP is not emergent   RUQ abd US- Evidence of biliary obstruction. Evidence of sludge and possible stones within the distal common bile duct.    NSCLC metastatic to brain.   ·  Plan: NSCLC with known brain metastases, S/P XRT and chemotherapy at [facility] lung cancer S/P left craniotomy and resection of mass in 09/2021  On Tagrisso 80mg daily-  to bring home medication  Hematology/oncology consulted- Dr Sorenson.  Routine EEG with left sided dysfunction, no epileptogenic discharge  Continue with  Decadron 4mg BID for Brain edema  No need for Aspirin at this time    Mild intermittent asthma. not in exacerbation. albuterol prn.  Spouse updated. spoke with oncologist Dr. Shi and transfer center today. Pt has been accepted to [facility] for further care. Cristela Javier is accepting MD.

## 2022-02-08 NOTE — PROGRESS NOTE ADULT - SUBJECTIVE AND OBJECTIVE BOX
lying in bed    Vital Signs Last 24 Hrs  T(C): 36.2 (2022 05:22), Max: 36.7 (2022 13:10)  T(F): 97.2 (2022 05:22), Max: 98.1 (2022 13:10)  HR: 51 (2022 05:22) (51 - 104)  BP: 119/75 (2022 05:22) (105/72 - 134/87)  BP(mean): --  RR: 19 (2022 05:22) (18 - 19)  SpO2: 98% (2022 05:22) (98% - 100%)    PHYSICAL EXAM:    general - Weak looking +  HEENT - No Icterus  CVS - RRR  RS - AE B/L  Abd - soft, NT  Ext - Pulses +        LABS:                        9.9    28.82 )-----------( 29       ( 2022 08:02 )             28.8     02-08    147<H>  |  119<H>  |  32<H>  ----------------------------<  130<H>  3.4<L>   |  24  |  0.60    Ca    8.1<L>      2022 08:02    TPro  6.6  /  Alb  1.9<L>  /  TBili  5.5<H>  /  DBili  4.1<H>  /  AST  86<H>  /  ALT  120<H>  /  AlkPhos  247<H>  02-08    PT/INR - ( 2022 14:50 )   PT: 18.4 sec;   INR: 1.62 ratio         PTT - ( 2022 14:50 )  PTT:29.7 sec  Urinalysis Basic - ( 2022 01:35 )    Color: Yellow / Appearance: Clear / S.010 / pH: x  Gluc: x / Ketone: Trace  / Bili: Large / Urobili: 4 mg/dL   Blood: x / Protein: 100 mg/dL / Nitrite: Negative   Leuk Esterase: Small / RBC: 6-10 /HPF / WBC 6-10   Sq Epi: x / Non Sq Epi: Few / Bacteria: Moderate        Culture - Blood (collected 2022 15:02)  Source: .Blood Blood-Peripheral  Gram Stain (prelim) (2022 07:16):    Growth in aerobic and anaerobic bottles: Gram Negative Rods  Preliminary Report (2022 07:16):    Growth in aerobic and anaerobic bottles: Gram Negative Rods    Culture - Blood (collected 2022 15:02)  Source: .Blood Blood-Peripheral  Gram Stain (prelim) (2022 07:17):    Growth in aerobic bottle:    Gram Negative Rods    Growth in anaerobic bottle: Gram Negative Rods  Preliminary Report (2022 07:17):    Growth in aerobic bottle:    Gram Negative Rods    Growth in anaerobic bottle: Gram Negative Rods    Culture - Blood (collected 2022 00:35)  Source: .Blood Blood-Peripheral  Gram Stain (prelim) (2022 13:59):    Growth in aerobic bottle: Gram Negative Rods    Growth in anaerobic bottle: Gram Negative Rods  Preliminary Report (2022 13:59):    Growth in aerobic bottle: Gram Negative Rods    Growth in anaerobic bottle: Gram Negative Rods    ***Blood Panel PCR results on this specimen are available    approximately 3 hours after the Gram stain result.***    Gram stain, PCR, and/or culture results may not always    correspond due to difference in methodologies.    ************************************************************    This PCR assay was performed by multiplex PCR. This    Assay tests for 66 bacterial and resistance gene targets.    Please refer to the [facility] test directory    at [email or web address] for details.  Organism: Blood Culture PCR (2022 16:36)  Organism: Blood Culture PCR (2022 16:36)    Culture - Blood (collected 2022 00:35)  Source: .Blood Blood-Peripheral  Gram Stain (prelim) (2022 11:03):    Growth in aerobic and anaerobic bottles: Gram Negative Rods  Preliminary Report (2022 11:03):    Growth in aerobic and anaerobic bottles: Gram Negative Rods    Culture - Urine (collected 2022 18:54)  Source: Clean Catch Clean Catch (Midstream)  Final Report (2022 18:30):    >100,000 CFU/ml Proteus mirabilis    <10,000 CFU/ml Normal Urogenital manish present  Organism: Proteus mirabilis (2022 18:30)  Organism: Proteus mirabilis (2022 18:30)

## 2022-02-09 ENCOUNTER — TRANSCRIPTION ENCOUNTER (OUTPATIENT)
Age: 47
End: 2022-02-09

## 2022-02-09 LAB
-  AMIKACIN: SIGNIFICANT CHANGE UP
-  AMIKACIN: SIGNIFICANT CHANGE UP
-  AMOXICILLIN/CLAVULANIC ACID: SIGNIFICANT CHANGE UP
-  AMPICILLIN/SULBACTAM: SIGNIFICANT CHANGE UP
-  AMPICILLIN/SULBACTAM: SIGNIFICANT CHANGE UP
-  AMPICILLIN: SIGNIFICANT CHANGE UP
-  AMPICILLIN: SIGNIFICANT CHANGE UP
-  AZTREONAM: SIGNIFICANT CHANGE UP
-  AZTREONAM: SIGNIFICANT CHANGE UP
-  CEFAZOLIN: SIGNIFICANT CHANGE UP
-  CEFAZOLIN: SIGNIFICANT CHANGE UP
-  CEFEPIME: SIGNIFICANT CHANGE UP
-  CEFEPIME: SIGNIFICANT CHANGE UP
-  CEFOXITIN: SIGNIFICANT CHANGE UP
-  CEFOXITIN: SIGNIFICANT CHANGE UP
-  CEFTRIAXONE: SIGNIFICANT CHANGE UP
-  CEFTRIAXONE: SIGNIFICANT CHANGE UP
-  CIPROFLOXACIN: SIGNIFICANT CHANGE UP
-  CIPROFLOXACIN: SIGNIFICANT CHANGE UP
-  ERTAPENEM: SIGNIFICANT CHANGE UP
-  ERTAPENEM: SIGNIFICANT CHANGE UP
-  GENTAMICIN: SIGNIFICANT CHANGE UP
-  GENTAMICIN: SIGNIFICANT CHANGE UP
-  IMIPENEM: SIGNIFICANT CHANGE UP
-  IMIPENEM: SIGNIFICANT CHANGE UP
-  LEVOFLOXACIN: SIGNIFICANT CHANGE UP
-  LEVOFLOXACIN: SIGNIFICANT CHANGE UP
-  MEROPENEM: SIGNIFICANT CHANGE UP
-  MEROPENEM: SIGNIFICANT CHANGE UP
-  NITROFURANTOIN: SIGNIFICANT CHANGE UP
-  PIPERACILLIN/TAZOBACTAM: SIGNIFICANT CHANGE UP
-  PIPERACILLIN/TAZOBACTAM: SIGNIFICANT CHANGE UP
-  TIGECYCLINE: SIGNIFICANT CHANGE UP
-  TOBRAMYCIN: SIGNIFICANT CHANGE UP
-  TOBRAMYCIN: SIGNIFICANT CHANGE UP
-  TRIMETHOPRIM/SULFAMETHOXAZOLE: SIGNIFICANT CHANGE UP
-  TRIMETHOPRIM/SULFAMETHOXAZOLE: SIGNIFICANT CHANGE UP
ALBUMIN SERPL ELPH-MCNC: 1.7 G/DL — LOW (ref 3.3–5)
ALP SERPL-CCNC: 254 U/L — HIGH (ref 40–120)
ALT FLD-CCNC: 99 U/L — HIGH (ref 12–78)
ANION GAP SERPL CALC-SCNC: 7 MMOL/L — SIGNIFICANT CHANGE UP (ref 5–17)
AST SERPL-CCNC: 67 U/L — HIGH (ref 15–37)
BILIRUB SERPL-MCNC: 3.8 MG/DL — HIGH (ref 0.2–1.2)
BUN SERPL-MCNC: 26 MG/DL — HIGH (ref 7–23)
CALCIUM SERPL-MCNC: 7.9 MG/DL — LOW (ref 8.5–10.1)
CHLORIDE SERPL-SCNC: 115 MMOL/L — HIGH (ref 96–108)
CO2 SERPL-SCNC: 22 MMOL/L — SIGNIFICANT CHANGE UP (ref 22–31)
CREAT SERPL-MCNC: 0.51 MG/DL — SIGNIFICANT CHANGE UP (ref 0.5–1.3)
CULTURE RESULTS: SIGNIFICANT CHANGE UP
GLUCOSE BLDC GLUCOMTR-MCNC: 106 MG/DL — HIGH (ref 70–99)
GLUCOSE BLDC GLUCOMTR-MCNC: 145 MG/DL — HIGH (ref 70–99)
GLUCOSE BLDC GLUCOMTR-MCNC: 169 MG/DL — HIGH (ref 70–99)
GLUCOSE SERPL-MCNC: 170 MG/DL — HIGH (ref 70–99)
GRAM STN FLD: SIGNIFICANT CHANGE UP
HCT VFR BLD CALC: 25.3 % — LOW (ref 34.5–45)
HGB BLD-MCNC: 8.5 G/DL — LOW (ref 11.5–15.5)
MCHC RBC-ENTMCNC: 29 PG — SIGNIFICANT CHANGE UP (ref 27–34)
MCHC RBC-ENTMCNC: 33.6 G/DL — SIGNIFICANT CHANGE UP (ref 32–36)
MCV RBC AUTO: 86.3 FL — SIGNIFICANT CHANGE UP (ref 80–100)
METHOD TYPE: SIGNIFICANT CHANGE UP
METHOD TYPE: SIGNIFICANT CHANGE UP
NRBC # BLD: 0 /100 WBCS — SIGNIFICANT CHANGE UP (ref 0–0)
ORGANISM # SPEC MICROSCOPIC CNT: SIGNIFICANT CHANGE UP
PLATELET # BLD AUTO: 65 K/UL — LOW (ref 150–400)
POTASSIUM SERPL-MCNC: 3.9 MMOL/L — SIGNIFICANT CHANGE UP (ref 3.5–5.3)
POTASSIUM SERPL-SCNC: 3.9 MMOL/L — SIGNIFICANT CHANGE UP (ref 3.5–5.3)
PROT SERPL-MCNC: 6.3 GM/DL — SIGNIFICANT CHANGE UP (ref 6–8.3)
RBC # BLD: 2.93 M/UL — LOW (ref 3.8–5.2)
RBC # FLD: 17.4 % — HIGH (ref 10.3–14.5)
SODIUM SERPL-SCNC: 144 MMOL/L — SIGNIFICANT CHANGE UP (ref 135–145)
SPECIMEN SOURCE: SIGNIFICANT CHANGE UP
WBC # BLD: 17.51 K/UL — HIGH (ref 3.8–10.5)
WBC # FLD AUTO: 17.51 K/UL — HIGH (ref 3.8–10.5)

## 2022-02-09 PROCEDURE — 99232 SBSQ HOSP IP/OBS MODERATE 35: CPT

## 2022-02-09 PROCEDURE — 99233 SBSQ HOSP IP/OBS HIGH 50: CPT

## 2022-02-09 RX ADMIN — Medication 4 MILLIGRAM(S): at 05:17

## 2022-02-09 RX ADMIN — Medication 250 MILLIGRAM(S): at 17:16

## 2022-02-09 RX ADMIN — MEROPENEM 100 MILLIGRAM(S): 1 INJECTION INTRAVENOUS at 16:34

## 2022-02-09 RX ADMIN — Medication 4 MILLIGRAM(S): at 17:52

## 2022-02-09 RX ADMIN — MEROPENEM 100 MILLIGRAM(S): 1 INJECTION INTRAVENOUS at 21:52

## 2022-02-09 RX ADMIN — Medication 5 MILLIGRAM(S): at 11:25

## 2022-02-09 RX ADMIN — Medication 5 MILLIGRAM(S): at 05:16

## 2022-02-09 RX ADMIN — MEROPENEM 100 MILLIGRAM(S): 1 INJECTION INTRAVENOUS at 05:20

## 2022-02-09 RX ADMIN — Medication 250 MILLIGRAM(S): at 05:16

## 2022-02-09 RX ADMIN — Medication 5 MILLIGRAM(S): at 17:16

## 2022-02-09 RX ADMIN — SODIUM CHLORIDE 100 MILLILITER(S): 9 INJECTION INTRAMUSCULAR; INTRAVENOUS; SUBCUTANEOUS at 16:34

## 2022-02-09 RX ADMIN — MONTELUKAST 5 MILLIGRAM(S): 4 TABLET, CHEWABLE ORAL at 11:24

## 2022-02-09 RX ADMIN — Medication 5 MILLIGRAM(S): at 00:14

## 2022-02-09 RX ADMIN — SODIUM CHLORIDE 100 MILLILITER(S): 9 INJECTION INTRAMUSCULAR; INTRAVENOUS; SUBCUTANEOUS at 05:16

## 2022-02-09 NOTE — DIETITIAN INITIAL EVALUATION ADULT. - ETIOLOGY
inadequate protein-energy intake related to NSCLC, chemotherapy, XRT, sepsis Inadequate protein-energy intake related to lung cancer with mets to brain

## 2022-02-09 NOTE — DISCHARGE NOTE NURSING/CASE MANAGEMENT/SOCIAL WORK - NSDCPEFALRISK_GEN_ALL_CORE
For information on Fall & Injury Prevention, visit: [email or web address] OR  [email or web address] OR  https://www.cdc.gov/steadi/patient.html

## 2022-02-09 NOTE — PROGRESS NOTE ADULT - PROBLEM SELECTOR PLAN 1
- continue Abx per ID  - for transfer to teritary care cenetr  - Decadron for Brain edema  - Prognosis Guarded  - tagrisso on hold

## 2022-02-09 NOTE — DISCHARGE NOTE NURSING/CASE MANAGEMENT/SOCIAL WORK - PATIENT PORTAL LINK FT
You can access the FollowMyHealth Patient Portal offered by [facility] by registering at the following website: [email or web address]. By joining [employer]’s FollowMyHealth portal, you will also be able to view your health information using other applications (apps) compatible with our system.

## 2022-02-09 NOTE — PROGRESS NOTE ADULT - SUBJECTIVE AND OBJECTIVE BOX
HEPATOBILIARY   +klebsiella bactermia   Afebrile     T(F): 98.1 (02-09-22 @ 11:08), Max: 98.7 (02-08-22 @ 22:32)  HR: 60 (02-09-22 @ 16:05) (60 - 81)  BP: 114/80 (02-09-22 @ 16:05) (114/80 - 123/81)  RR: 18 (02-09-22 @ 16:05) (18 - 18)  SpO2: 100% (02-09-22 @ 16:05) (98% - 100%)  Wt(kg): --  CAPILLARY BLOOD GLUCOSE      POCT Blood Glucose.: 145 mg/dL (09 Feb 2022 11:04)  POCT Blood Glucose.: 169 mg/dL (09 Feb 2022 07:46)  POCT Blood Glucose.: 127 mg/dL (08 Feb 2022 17:45)      LABS:                        8.5    17.51 )-----------( 65       ( 09 Feb 2022 07:33 )             25.3     02-09    144  |  115<H>  |  26<H>  ----------------------------<  170<H>  3.9   |  22  |  0.51    Ca    7.9<L>      09 Feb 2022 07:33    TPro  6.3  /  Alb  1.7<L>  /  TBili  3.8<H>  /  DBili  x   /  AST  67<H>  /  ALT  99<H>  /  AlkPhos  254<H>  02-09    LIVER FUNCTIONS - ( 09 Feb 2022 07:33 )  Alb: 1.7 g/dL / Pro: 6.3 gm/dL / ALK PHOS: 254 U/L / ALT: 99 U/L / AST: 67 U/L / GGT: x             I&O's Detail    08 Feb 2022 07:01  -  09 Feb 2022 07:00  --------------------------------------------------------  IN:    IV PiggyBack: 59 mL    sodium chloride 0.9%: 1200 mL  Total IN: 1259 mL    OUT:    Voided (mL): 400 mL  Total OUT: 400 mL    Total NET: 859 mL      09 Feb 2022 07:01  -  09 Feb 2022 16:55  --------------------------------------------------------  IN:  Total IN: 0 mL    OUT:    Voided (mL): 300 mL  Total OUT: 300 mL    Total NET: -300 mL        02-09 @ 07:33    144 | 115 | 26  /7.9 | -- | --  _______________________/  3.9 | 22 | 0.51                           \par

## 2022-02-09 NOTE — DIETITIAN INITIAL EVALUATION ADULT. - OTHER CALCULATIONS
ht: 175.3 cm; Wt: 75 kg; BMI: 24.4; IBW: 145 lbs/65.9 kg; %IBW: 114%; ht: 175.3 cm; Wt: 67.5 kg (2/8); BMI: 21.9; IBW: 65.7 kg +/- 10%; %IBW: 102.7%; UBW: unknown, %UBW: unknown

## 2022-02-09 NOTE — PROGRESS NOTE ADULT - SUBJECTIVE AND OBJECTIVE BOX
VENANCIO CHAIREZ  MRN-[id number]    Follow Up:  Bacteremia, UTI, cholangitis     Interval History: the pt was seen and examined earlier, frustrated about waiting for her transfer, not in a good mood, does not want to be touched, refused exam. The pt is afebrile, on NC, WBC decreased 17.51, Cr ok, LFTs better.     PAST MEDICAL & SURGICAL HISTORY:  No pertinent past medical history    Malignant neoplasm of lung, unspecified laterality, unspecified part of lung    Gastroesophageal reflux disease, esophagitis presence not specified    Asthma, unspecified asthma severity, unspecified whether complicated, unspecified whether persistent    H/O craniotomy    Brain mass        ROS:    [ ] Unobtainable because:  [x ] All other systems negative    Constitutional: no fever, no chills  Head: no trauma  Eyes: no vision changes, no eye pain  ENT:  no sore throat, no rhinorrhea  Cardiovascular:  no chest pain, no palpitation  Respiratory:  no SOB with supplemental O2, no cough  GI:  + abd pain, no vomiting, no diarrhea  urinary: no dysuria, no hematuria, no flank pain  musculoskeletal:  no joint pain, no joint swelling  skin:  no rash  neurology:  no headache, no seizure, no change in mental status  psych: no anxiety, no depression         Allergies  codeine (Other)  latex (Rash)        ANTIMICROBIALS:  meropenem  IVPB 1000 every 8 hours      OTHER MEDS:  dexAMETHasone  Injectable 4 milliGRAM(s) IV Push two times a day  dextrose 40% Gel 15 Gram(s) Oral once  dextrose 50% Injectable 25 Gram(s) IV Push once  dextrose 50% Injectable 12.5 Gram(s) IV Push once  dextrose 50% Injectable 25 Gram(s) IV Push once  glucagon  Injectable 1 milliGRAM(s) IntraMuscular once  melatonin 3 milliGRAM(s) Oral at bedtime PRN  metoprolol tartrate Injectable 5 milliGRAM(s) IV Push every 6 hours  montelukast  Chewable 5 milliGRAM(s) Oral daily  prochlorperazine   Tablet 10 milliGRAM(s) Oral every 6 hours PRN  sodium chloride 0.9%. 1000 milliLiter(s) IV Continuous <Continuous>  valproic acid 250 milliGRAM(s) Oral two times a day      Vital Signs Last 24 Hrs  T(C): 36.7 (09 Feb 2022 11:08), Max: 37.1 (08 Feb 2022 22:32)  T(F): 98.1 (09 Feb 2022 11:08), Max: 98.7 (08 Feb 2022 22:32)  HR: 60 (09 Feb 2022 16:05) (60 - 81)  BP: 114/80 (09 Feb 2022 16:05) (114/80 - 123/81)  BP(mean): --  RR: 18 (09 Feb 2022 16:05) (18 - 18)  SpO2: 100% (09 Feb 2022 16:05) (98% - 100%)    Physical Exam:  Constitutional: non-toxic, no distress, NC  HEAD/EYES: anicteric, no conjunctival injection, scleral icterus, left scalp deformity with large scar due to prior craniotomy and brain surgery  ENT:  supple  Cardiovascular:   refused exam  Respiratory:  refused exam  GI:  refused exam  :  refused exam  Musculoskeletal:  refused exam  Neurologic: awake and alert, at times does not want to answer questions  Skin:  no rash  Heme/Onc: refused exam  Psychiatric:  awake, alert, anxious and frustrated, emotional    WBC Count: 17.51 K/uL (02-09 @ 07:33)  WBC Count: 28.82 K/uL (02-08 @ 08:02)  WBC Count: 27.06 K/uL (02-07 @ 19:19)  WBC Count: 30.10 K/uL (02-07 @ 08:37)  WBC Count: 27.59 K/uL (02-06 @ 22:00)  WBC Count: 25.27 K/uL (02-06 @ 12:37)  WBC Count: 24.12 K/uL (02-06 @ 10:52)                            8.5    17.51 )-----------( 65       ( 09 Feb 2022 07:33 )             25.3       02-09    144  |  115<H>  |  26<H>  ----------------------------<  170<H>  3.9   |  22  |  0.51    Ca    7.9<L>      09 Feb 2022 07:33    TPro  6.3  /  Alb  1.7<L>  /  TBili  3.8<H>  /  DBili  x   /  AST  67<H>  /  ALT  99<H>  /  AlkPhos  254<H>  02-09          Creatinine Trend: 0.51<--, 0.60<--, 0.63<--, 0.85<--, 0.84<--, 0.76<--      MICROBIOLOGY:  v  .Blood Blood-Peripheral  02-07-22   Growth in aerobic and anaerobic bottles: Klebsiella aerogenes  See previous culture [id number]  --    Growth in aerobic bottle:  Gram Negative Rods  Growth in anaerobic bottle: Gram Negative Rods      Clean Catch Clean Catch (Midstream)  02-07-22   10,000 - 49,000 CFU/mL Klebsiella aerogenes  --  --      .Blood Blood-Peripheral  02-07-22   Growth in aerobic and anaerobic bottles: Klebsiella aerogenes  See previous culture [id number]  --  Blood Culture PCR  Enterobacter aerogenes      Clean Catch Clean Catch (Midstream)  02-02-22   >100,000 CFU/ml Proteus mirabilis  <10,000 CFU/ml Normal Urogenital manish present  --  Proteus mirabilis      Ferritin, Serum: 930 (02-03)  SARS-CoV-2 Result: NotDetec (02-07-22 @ 14:30)    COVID-19 PCR: NotDetec (03 Sep 2021 22:43)    RADIOLOGY:

## 2022-02-09 NOTE — PROGRESS NOTE ADULT - ASSESSMENT
44 years old female with h/o asthma, NSCLC with known brain metastases, S/P XRT and chemotherapy at [facility] lung cancer S/P left craniotomy and resection of mass in 09/2021 (Dr Parviz Paul) present to ED with slurry speech started at 6AM, last known normal was 4AM.  Reported 1-2 episode of vomiting last night. Per , patient has slight weakness on right leg.   Tachycardic to 120s.   CT head with Interval decompressive left frontotemporal craniectomy with decreased mass effect and resolved rightward midline shift secondary to significant left frontal vasogenic edema. No acute intracranial hemorrhage, extra-axial collection or new suspicious region of vasogenic edema.   CTA head/neck with no vaso-occlusive disease. CT perfusion-26 mL of tissue with elevated time to maximum in the left superior medial frontal lobe, likely representing chronic posttreatment changes. Not a candidate for TPA.   CT (I personally reviewed) possible acute cholangitis   blood cultures positive with enterobacter   high bili, thrombocytopenia, leukocytosis  MRCP small 6mm stone    she is on chemo as well radiation     2/8: afebrile, on RA, WBC high 28.82, BC growing enterobacter (not cloacae) isolated by PCR and 1/4 with Klebsiella aerogenes. Two new sets of BCs with gram negative rods, ID pending, two more sets are pending. The pt is s/p one dose of Amikacin yesterday, on IV Meropenem. Abd exam with diffuse tenderness.    Attending addendum:  agree with above  IR consult for perc cholecystomy   polymicrobial bacteremia likely from obstructed CBD stone   remains on steroids for vasogenic edema   continue meropenem  follow all culture sensitivities   continue to attempt to transfer patient to tertiary care center   2/9: initial BCs with Klebsiella and Enterobacter, Klebsiella sensitivity is pending, repeat BCs with growth, UC #1 with Proteus, UC #2 with Klebsiella, will continue with Meropenem for now without change. No fevers, WBC better 17.51, Cr ok, LFTs better. Pt is frustrated about her wait for the transfer.     sepsis due to cholangitis   leukocytosis   thrombocytopenia     Plan:  s/p one dose of amikacin 900mg on 2/7  continue meropenem 1g q8hrs until sensitivities are returned  no need to add additional anaerobic coverage as meropenem will cover   patient needs ERCP but deemed not urgent  if ERCP is done please send cultures   follow blood cultures ID and sensitivities   transfer to tertiary care center  if long delay in transfer, IR consult for percutaneous cholecystotomy   repeat blood cultures are pending   trend platelets and transfuse as needed   maintain active type and screen   trend wbc   avoid any further doses of chemotherapy at this time during an active infection     Discussed with Dr. Giraldo  Discussed with Dr. Mahre

## 2022-02-09 NOTE — PROGRESS NOTE ADULT - SUBJECTIVE AND OBJECTIVE BOX
CHIEF COMPLAINT: Follow up of sepsis with acute cholangitis in an immuno-compromized patient with lung cancer and brain mets  no report of any fever or vomiting or active gross bleeding.   no diarrhea      PHYSICAL EXAM:    GENERAL: Moderately built, no acute distress   CHEST/LUNG: Clear to ausculation bilaterally, no wheezing, no crackles   HEART: Regular rate and rhythm; No murmurs, rubs  ABDOMEN: patient refused abd exam.   EXTREMITIES:  , No clubbing, cyanosis, or edema  NERVOUS SYSTEM:  Limited exam due to poor participation.   Psychiatry: AA and orientated to her name. other questions patient did not answer but did follow simple commands.       OBJECTIVE DATA:   Vital Signs Last 24 Hrs  T(C): 36.7 (2022 11:08), Max: 37.1 (2022 22:32)  T(F): 98.1 (2022 11:08), Max: 98.7 (2022 22:32)  HR: 64 (2022 11:08) (60 - 108)  BP: 115/77 (2022 11:08) (115/77 - 127/87)  BP(mean): --  RR: 18 (2022 11:08) (18 - 20)  SpO2: 100% (2022 11:08) (98% - 100%)           Daily     Daily Weight in k (2022 05:16)  LABS:                        8.5    17.51 )-----------( 65       ( 2022 07:33 )             25.3             02-    144  |  115<H>  |  26<H>  ----------------------------<  170<H>  3.9   |  22  |  0.51    Ca    7.9<L>      2022 07:33    TPro  6.3  /  Alb  1.7<L>  /  TBili  3.8<H>  /  DBili  x   /  AST  67<H>  /  ALT  99<H>  /  AlkPhos  254<H>  02              PT/INR - ( 2022 14:50 )   PT: 18.4 sec;   INR: 1.62 ratio         PTT - ( 2022 14:50 )  PTT:29.7 sec          CAPILLARY BLOOD GLUCOSE      POCT Blood Glucose.: 145 mg/dL (2022 11:04)      Culture - Blood  Source: .Blood Blood-Peripheral  Gram Stain ():    Growth in aerobic and anaerobic bottles: Gram Negative Rods  Final Report ():    Growth in aerobic and anaerobic bottles: Klebsiella aerogenes    See previous culture [id number]    Culture - Blood  Source: .Blood Blood-Peripheral  Gram Stain ():    Growth in aerobic bottle:    Gram Negative Rods    Growth in anaerobic bottle: Gram Negative Rods  Final Report ():    Growth in aerobic and anaerobic bottles: Klebsiella aerogenes    See previous culture [id number]    Culture - Urine  Source: Clean Catch Clean Catch (Midstream)  Preliminary Report ():    10,000 - 49,000 CFU/mL Klebsiella aerogenes    Culture - Blood  Source: .Blood Blood-Peripheral  Gram Stain ():    Growth in aerobic bottle: Gram Negative Rods    Growth in anaerobic bottle: Gram Negative Rods  Final Report ():    Growth in aerobic and anaerobic bottles: Klebsiella aerogenes    ***Blood Panel PCR results on this specimen are available    approximately 3 hours after the Gram stain result.***    Gram stain, PCR, and/or culture results may not always    correspond due to difference in methodologies.    ************************************************************    This PCR assay was performed by multiplex PCR. This    Assay tests for 66 bacterial and resistance gene targets.    Please refer to the [facility] Labs test directory    at [email or web address] for details.  Organism: Blood Culture PCR  Enterobacter aerogenes ()  Organism: Enterobacter aerogenes ()    Sensitivities:      -  Amikacin: S <=16      -  Ampicillin: R >16 These ampicillin results predict results for amoxicillin      -  Ampicillin/Sulbactam: R >16/8 Enterobacter, Klebsiella aerogenes, Citrobacter, and Serratia may develop resistance during prolonged therapy (3-4 days)      -  Aztreonam: S <=4      -  Cefazolin: R >16 Enterobacter, Klebsiella aerogenes, Citrobacter, and Serratia may develop resistance during prolonged therapy (3-4 days)      -  Cefepime: S <=2      -  Cefoxitin: R >16      -  Ceftriaxone: S <=1 Enterobacter, Klebsiella aerogenes, Citrobacter, and Serratia may develop resistance during prolonged therapy      -  Ciprofloxacin: S <=0.25      -  Ertapenem: S <=0.5      -  Gentamicin: S <=2      -  Imipenem: S <=1      -  Levofloxacin: S <=0.5      -  Meropenem: S <=1      -  Piperacillin/Tazobactam: S <=8      -  Tobramycin: S <=2      -  Trimethoprim/Sulfamethoxazole: S <=0.5/9.5      Method Type: NETO  Organism: Blood Culture PCR ()    Sensitivities:      -  Enterobacter (non-cloacae complex): Detec      Method Type: PCR    Culture - Blood  Source: .Blood Blood-Peripheral  Gram Stain ():    Growth in aerobic and anaerobic bottles: Gram Negative Rods  Final Report ():    Growth in aerobic and anaerobic bottles: Klebsiella aerogenes    See previous culture [id number]         Interval Radiology studies: reviewed by me    MEDICATIONS  (STANDING):  dexAMETHasone  Injectable 4 milliGRAM(s) IV Push two times a day  dextrose 40% Gel 15 Gram(s) Oral once  dextrose 50% Injectable 25 Gram(s) IV Push once  dextrose 50% Injectable 12.5 Gram(s) IV Push once  dextrose 50% Injectable 25 Gram(s) IV Push once  glucagon  Injectable 1 milliGRAM(s) IntraMuscular once  meropenem  IVPB 1000 milliGRAM(s) IV Intermittent every 8 hours  metoprolol tartrate Injectable 5 milliGRAM(s) IV Push every 6 hours  montelukast  Chewable 5 milliGRAM(s) Oral daily  sodium chloride 0.9%. 1000 milliLiter(s) (100 mL/Hr) IV Continuous <Continuous>  valproic acid 250 milliGRAM(s) Oral two times a day    MEDICATIONS  (PRN):  melatonin 3 milliGRAM(s) Oral at bedtime PRN Insomnia  prochlorperazine   Tablet 10 milliGRAM(s) Oral every 6 hours PRN nausea, vomiting

## 2022-02-09 NOTE — DIETITIAN INITIAL EVALUATION ADULT. - PHYSCIAL ASSESSMENT
debilitated Pt with visible signs of mild muscle wasting and fat depletion in following regions: temporal(moderate/severe), orbital(moderate), buccal(mild/moderate), clavicle(moderate/severe)

## 2022-02-09 NOTE — DIETITIAN INITIAL EVALUATION ADULT. - PERTINENT MEDS FT
MEDICATIONS  (STANDING):  dexAMETHasone  Injectable 4 milliGRAM(s) IV Push two times a day  dextrose 40% Gel 15 Gram(s) Oral once  dextrose 50% Injectable 25 Gram(s) IV Push once  dextrose 50% Injectable 12.5 Gram(s) IV Push once  dextrose 50% Injectable 25 Gram(s) IV Push once  glucagon  Injectable 1 milliGRAM(s) IntraMuscular once  meropenem  IVPB 1000 milliGRAM(s) IV Intermittent every 8 hours  metoprolol tartrate Injectable 5 milliGRAM(s) IV Push every 6 hours  montelukast  Chewable 5 milliGRAM(s) Oral daily  sodium chloride 0.9%. 1000 milliLiter(s) (100 mL/Hr) IV Continuous <Continuous>  valproic acid 250 milliGRAM(s) Oral two times a day    MEDICATIONS  (PRN):  melatonin 3 milliGRAM(s) Oral at bedtime PRN Insomnia  prochlorperazine   Tablet 10 milliGRAM(s) Oral every 6 hours PRN nausea, vomiting

## 2022-02-09 NOTE — DIETITIAN INITIAL EVALUATION ADULT. - SIGNS/SYMPTOMS
physical signs of moderate-severe muscle wasting and fat loss physical signs of moderate muscle wasting and fat loss as noted

## 2022-02-09 NOTE — PROGRESS NOTE ADULT - PROBLEM SELECTOR PLAN 1
-Transfer to tertiary care center pending   -ERCP discussed ; evaluation at tertiary care center given clinical concern for cholangitis   -Appreciate IR recommendations; Perc sveta  -Continue abx; appreciate ID recs   -Trend LFTs

## 2022-02-09 NOTE — DIETITIAN INITIAL EVALUATION ADULT. - MALNUTRITION
Moderate-severe malnutrition moderate-severe malnutrition in context of chronic illness Moderate malnutrition in context of chronic illness

## 2022-02-09 NOTE — DIETITIAN INITIAL EVALUATION ADULT. - PROBLEM SELECTOR PLAN 3
NSCLC with known brain metastases, S/P XRT and chemotherapy at [facility] lung cancer S/P left craniotomy and resection of mass in 09/2021  On Tagrisso 80mg daily-  to bring home medication  Hematology/oncology consulted- Dr Sorenson

## 2022-02-09 NOTE — DIETITIAN INITIAL EVALUATION ADULT. - PERTINENT LABORATORY DATA
02-09 Na144 mmol/L Glu 170 mg/dL<H> K+ 3.9 mmol/L Cr  0.51 mg/dL BUN 26 mg/dL<H> WBC 17.51 K/uL<H> 02-03 Phos 3.8 mg/dL 02-09 Alb 1.7 g/dL<L> 02-03 Chol 187 mg/dL LDL 90 mg/dL  HDL 88 mg/dL Trig 45 mg/dL A1c: 5.5 (2-03)

## 2022-02-09 NOTE — DIETITIAN INITIAL EVALUATION ADULT. - OTHER INFO
Pt appeared frustrated and disinterested in speaking with RD; unable to obtain information regarding pt's diet & wt hx. RD remains available; pt aware.  Pt with poor appetite/po intake during LOS; no reports of difficulty chewing/swallowing. Pt appeared frustrated and disinterested in speaking with RD; unable to obtain information regarding pt's diet & wt hx. RD remains available; pt aware.  Pt with poor appetite/po intake during LOS; per swallow evaluation on 2/3, SLP recommended regular solids with thin liquids; tolerating consistency without difficulty chewing/swallowing. Pt appeared frustrated, and disinterested in speaking with RD; unable to obtain information regarding pt's diet & wt hx. RD remains available; pt aware.  Pt with poor appetite/po intake during LOS; per swallow evaluation on 2/3, SLP recommended regular solids with thin liquids; tolerating consistency without difficulty chewing/swallowing. Provided pt with bistro menu & alternative menu options.

## 2022-02-09 NOTE — PROGRESS NOTE ADULT - SUBJECTIVE AND OBJECTIVE BOX
lying in bed    Vital Signs Last 24 Hrs  T(C): 36.8 (09 Feb 2022 05:16), Max: 37.1 (08 Feb 2022 22:32)  T(F): 98.3 (09 Feb 2022 05:16), Max: 98.7 (08 Feb 2022 22:32)  HR: 60 (09 Feb 2022 05:16) (56 - 108)  BP: 116/79 (09 Feb 2022 05:16) (114/68 - 127/87)  BP(mean): --  RR: 18 (09 Feb 2022 05:16) (18 - 20)  SpO2: 100% (09 Feb 2022 05:16) (97% - 100%)    PHYSICAL EXAM:    general - Weak looking +  HEENT - No LN's  CVS - RRR  RS - AE B/L  Abd - soft, NT  Ext - Pulses +        LABS:                        8.5    17.51 )-----------( 65       ( 09 Feb 2022 07:33 )             25.3     02-09    144  |  115<H>  |  26<H>  ----------------------------<  170<H>  3.9   |  22  |  0.51    Ca    7.9<L>      09 Feb 2022 07:33    TPro  6.3  /  Alb  1.7<L>  /  TBili  3.8<H>  /  DBili  x   /  AST  67<H>  /  ALT  99<H>  /  AlkPhos  254<H>  02-09    PT/INR - ( 07 Feb 2022 14:50 )   PT: 18.4 sec;   INR: 1.62 ratio         PTT - ( 07 Feb 2022 14:50 )  PTT:29.7 sec      Culture - Blood (collected 07 Feb 2022 15:02)  Source: .Blood Blood-Peripheral  Gram Stain (09 Feb 2022 09:27):    Growth in aerobic and anaerobic bottles: Gram Negative Rods  Final Report (09 Feb 2022 09:27):    Growth in aerobic and anaerobic bottles: Klebsiella aerogenes    See previous culture [id number]    Culture - Blood (collected 07 Feb 2022 15:02)  Source: .Blood Blood-Peripheral  Gram Stain (prelim) (08 Feb 2022 07:17):    Growth in aerobic bottle:    Gram Negative Rods    Growth in anaerobic bottle: Gram Negative Rods  Preliminary Report (09 Feb 2022 00:51):    Growth in aerobic bottle: Klebsiella aerogenes    See previous culture [id number]    Growth in anaerobic bottle: Gram Negative Rods    Culture - Urine (collected 07 Feb 2022 09:07)  Source: Clean Catch Clean Catch (Midstream)  Preliminary Report (08 Feb 2022 19:09):    10,000 - 49,000 CFU/mL Klebsiella aerogenes    Culture - Blood (collected 07 Feb 2022 00:35)  Source: .Blood Blood-Peripheral  Gram Stain (09 Feb 2022 09:00):    Growth in aerobic bottle: Gram Negative Rods    Growth in anaerobic bottle: Gram Negative Rods  Final Report (09 Feb 2022 09:00):    Growth in aerobic and anaerobic bottles: Klebsiella aerogenes    ***Blood Panel PCR results on this specimen are available    approximately 3 hours after the Gram stain result.***    Gram stain, PCR, and/or culture results may not always    correspond due to difference in methodologies.    ************************************************************    This PCR assay was performed by multiplex PCR. This    Assay tests for 66 bacterial and resistance gene targets.    Please refer to the [facility] Labs test directory    at [email or web address] for details.  Organism: Blood Culture PCR  Enterobacter aerogenes (09 Feb 2022 09:00)  Organism: Enterobacter aerogenes (09 Feb 2022 09:00)  Organism: Blood Culture PCR (09 Feb 2022 09:00)    Culture - Blood (collected 07 Feb 2022 00:35)  Source: .Blood Blood-Peripheral  Gram Stain (prelim) (07 Feb 2022 11:03):    Growth in aerobic and anaerobic bottles: Gram Negative Rods  Preliminary Report (08 Feb 2022 11:43):    Growth in aerobic and anaerobic bottles: Klebsiella aerogenes    See previous culture [id number]    Culture - Urine (collected 02 Feb 2022 18:54)  Source: Clean Catch Clean Catch (Midstream)  Final Report (05 Feb 2022 18:30):    >100,000 CFU/ml Proteus mirabilis    <10,000 CFU/ml Normal Urogenital amnish present  Organism: Proteus mirabilis (05 Feb 2022 18:30)  Organism: Proteus mirabilis (05 Feb 2022 18:30)

## 2022-02-09 NOTE — PROGRESS NOTE ADULT - ASSESSMENT
44 years old female with h/o asthma, NSCLC with known brain metastases, S/P XRT and chemotherapy at [facility] lung cancer S/P left craniotomy and resection of mass in 09/2021. Admitted with Acute CVA on IV heparin ( not a candidate for TPA)  Asked to see patient for CBD stone with elevated LFTS and jaundice  +klebsiella bactermia  Afebrile     Clinical concern for cholangitis given elevated LFTs, cbd stone, klebsiella bacteremia. ERCP indicated. Complicated by severe thrombocytopenia, acute VA on IV heparin. IR evaluating. Will be transferred to tertiary care center.

## 2022-02-09 NOTE — PROGRESS NOTE ADULT - ASSESSMENT
HPI: 44 years old female with h/o asthma, NSCLC with known brain metastases, S/P XRT and chemotherapy at [facility] lung cancer S/P left craniotomy and resection of mass in 09/2021 (Dr Parviz Paul) present to ED with slurry speech started at 6AM, last known normal was 4AM  Tachycardic to 120s ( per patient, baseline HR in 120), BP stable, afebrile sat well at RA. WBC 13.36, K 3.5, Cr 1.17, AST/ALT 1207/1231, lipase normal, CK 78. CT head with Interval decompressive left frontotemporal craniectomy with decreased mass effect and resolved rightward midline shift secondary to significant left frontal vasogenic edema. No acute intracranial hemorrhage, extra-axial collection or new suspicious region of vasogenic edema. CTA head/neck with no vaso-occlusive disease. CT perfusion-26 mL of tissue with elevated time to maximum in the left superior medial frontal lobe, likely representing chronic posttreatment changes. Not a candidate for TPA.     Course:     brain mass lesion w/surrounding edema   present with slurry speech and slight right leg weakness  Not a candidate for TPA  CT head with Interval decompressive left frontotemporal craniectomy with decreased mass effect and resolved rightward midline shift secondary to significant left frontal vasogenic edema. No acute intracranial hemorrhage, extra-axial collection or new suspicious region of vasogenic edema. CTA head/neck with no vaso-occlusive disease. CT perfusion-26 mL of tissue with elevated time to maximum in the left superior medial frontal lobe, likely representing chronic posttreatment changes  Neurology following.   Cont decadrone.   No statin due to elevated LFT  No aspirin due to thrombocytopenia.   MRI brain with and without contrast ;  New postop changes are identified with a large area of abnormal enhancement seen involving the left frontal cortical subcortical region with increased surrounding edema mass effect on left lateral ventricle left-to-right shift. This finding is worrisome for progression of patient's underlying disease process  EEG noted  PT/OT/Speech eval passed; regular diet  2/6 CTH- no bleed    acute metabolic encephalopathy with sepsis with acute cholangitis with klebsiella bacteremia.    Pending HIT, fibrinogen, PT/PTT, serotonin releasing assay  s/p platelet and blood transfusions. H and H stable. platelet count better.  cont current antibiotic per ID.     Elevated LFTs. Transaminitis with CBD stone   AST/ALT 1207/1231  New abnormality. No history of ETOH, drug use, recent change in medications  Acute hepatitis panel   GI consulted- Dr Singh  reviewed ID and IR notes. patient will need tertiary care center care for further intervention. I called transfer center and got connected with [facility] transfer center. Awaiting a call back from accepting physician to give sign out. Follow labs daily.     NSCLC metastatic to brain.   ·  Plan: NSCLC with known brain metastases, S/P XRT and chemotherapy at [facility] lung cancer S/P left craniotomy and resection of mass in 09/2021  On Tagrisso 80mg daily-  to bring home medication  Hematology/oncology consulted- Dr Sorenson.  Routine EEG with left sided dysfunction, no epileptogenic discharge  Continue with  Decadron 4mg BID for Brain edema  No need for Aspirin at this time    Mild intermittent asthma. Not in exacerbation. albuterol prn.    Full Code  Plan discussed with hematologist, ID and Spouse Joessito.

## 2022-02-10 LAB
ALBUMIN SERPL ELPH-MCNC: 1.8 G/DL — LOW (ref 3.3–5)
ALP SERPL-CCNC: 267 U/L — HIGH (ref 40–120)
ALT FLD-CCNC: 106 U/L — HIGH (ref 12–78)
ANION GAP SERPL CALC-SCNC: 6 MMOL/L — SIGNIFICANT CHANGE UP (ref 5–17)
AST SERPL-CCNC: 53 U/L — HIGH (ref 15–37)
BILIRUB SERPL-MCNC: 2.6 MG/DL — HIGH (ref 0.2–1.2)
BUN SERPL-MCNC: 21 MG/DL — SIGNIFICANT CHANGE UP (ref 7–23)
CALCIUM SERPL-MCNC: 7.7 MG/DL — LOW (ref 8.5–10.1)
CHLORIDE SERPL-SCNC: 114 MMOL/L — HIGH (ref 96–108)
CO2 SERPL-SCNC: 22 MMOL/L — SIGNIFICANT CHANGE UP (ref 22–31)
CREAT SERPL-MCNC: 0.49 MG/DL — LOW (ref 0.5–1.3)
GLUCOSE BLDC GLUCOMTR-MCNC: 105 MG/DL — HIGH (ref 70–99)
GLUCOSE BLDC GLUCOMTR-MCNC: 129 MG/DL — HIGH (ref 70–99)
GLUCOSE BLDC GLUCOMTR-MCNC: 90 MG/DL — SIGNIFICANT CHANGE UP (ref 70–99)
GLUCOSE SERPL-MCNC: 107 MG/DL — HIGH (ref 70–99)
HCT VFR BLD CALC: 27.9 % — LOW (ref 34.5–45)
HGB BLD-MCNC: 9.4 G/DL — LOW (ref 11.5–15.5)
MCHC RBC-ENTMCNC: 29.1 PG — SIGNIFICANT CHANGE UP (ref 27–34)
MCHC RBC-ENTMCNC: 33.7 G/DL — SIGNIFICANT CHANGE UP (ref 32–36)
MCV RBC AUTO: 86.4 FL — SIGNIFICANT CHANGE UP (ref 80–100)
NRBC # BLD: 0 /100 WBCS — SIGNIFICANT CHANGE UP (ref 0–0)
PLATELET # BLD AUTO: 51 K/UL — LOW (ref 150–400)
POTASSIUM SERPL-MCNC: 4.3 MMOL/L — SIGNIFICANT CHANGE UP (ref 3.5–5.3)
POTASSIUM SERPL-SCNC: 4.3 MMOL/L — SIGNIFICANT CHANGE UP (ref 3.5–5.3)
PROT SERPL-MCNC: 6.6 GM/DL — SIGNIFICANT CHANGE UP (ref 6–8.3)
RBC # BLD: 3.23 M/UL — LOW (ref 3.8–5.2)
RBC # FLD: 17.3 % — HIGH (ref 10.3–14.5)
SODIUM SERPL-SCNC: 142 MMOL/L — SIGNIFICANT CHANGE UP (ref 135–145)
SRA INTERP SER-IMP: SIGNIFICANT CHANGE UP
WBC # BLD: 20.7 K/UL — HIGH (ref 3.8–10.5)
WBC # FLD AUTO: 20.7 K/UL — HIGH (ref 3.8–10.5)

## 2022-02-10 PROCEDURE — 99233 SBSQ HOSP IP/OBS HIGH 50: CPT

## 2022-02-10 RX ORDER — POLYETHYLENE GLYCOL 3350 17 G/17G
17 POWDER, FOR SOLUTION ORAL DAILY
Refills: 0 | Status: DISCONTINUED | OUTPATIENT
Start: 2022-02-10 | End: 2022-02-28

## 2022-02-10 RX ADMIN — SODIUM CHLORIDE 100 MILLILITER(S): 9 INJECTION INTRAMUSCULAR; INTRAVENOUS; SUBCUTANEOUS at 05:32

## 2022-02-10 RX ADMIN — MEROPENEM 100 MILLIGRAM(S): 1 INJECTION INTRAVENOUS at 13:48

## 2022-02-10 RX ADMIN — Medication 5 MILLIGRAM(S): at 05:32

## 2022-02-10 RX ADMIN — MONTELUKAST 5 MILLIGRAM(S): 4 TABLET, CHEWABLE ORAL at 11:21

## 2022-02-10 RX ADMIN — Medication 250 MILLIGRAM(S): at 05:32

## 2022-02-10 RX ADMIN — Medication 5 MILLIGRAM(S): at 17:11

## 2022-02-10 RX ADMIN — Medication 4 MILLIGRAM(S): at 05:32

## 2022-02-10 RX ADMIN — Medication 250 MILLIGRAM(S): at 17:11

## 2022-02-10 RX ADMIN — POLYETHYLENE GLYCOL 3350 17 GRAM(S): 17 POWDER, FOR SOLUTION ORAL at 16:00

## 2022-02-10 RX ADMIN — MEROPENEM 100 MILLIGRAM(S): 1 INJECTION INTRAVENOUS at 05:32

## 2022-02-10 RX ADMIN — Medication 5 MILLIGRAM(S): at 00:31

## 2022-02-10 RX ADMIN — MEROPENEM 100 MILLIGRAM(S): 1 INJECTION INTRAVENOUS at 21:57

## 2022-02-10 RX ADMIN — Medication 4 MILLIGRAM(S): at 17:10

## 2022-02-10 NOTE — PROGRESS NOTE ADULT - PROBLEM SELECTOR PLAN 1
- continue Abx per ID  - for transfer to teritary care cenetr  - Decadron for Brain edema  - Prognosis Guarded  - tagrisso on hold - continue Abx per ID  [facility]  - Decadron for Brain edema  - Prognosis Guarded  - tagrisso on hold

## 2022-02-10 NOTE — PROGRESS NOTE ADULT - SUBJECTIVE AND OBJECTIVE BOX
CHIEF COMPLAINT: Follow up of sepsis with acute cholangitis in an immuno-compromized patient with lung cancer and brain mets  no report of any fever or vomiting or active gross bleeding.   no diarrhea  no new overnight events     PHYSICAL EXAM:    GENERAL: Moderately built, no acute distress   CHEST/LUNG: Clear to ausculation bilaterally, no wheezing, no crackles   HEART: Regular rate and rhythm; No murmurs, rubs  ABDOMEN: patient refused abd exam.   EXTREMITIES:  , No clubbing, cyanosis, or edema  NERVOUS SYSTEM:  Limited exam due to poor participation.   Psychiatry: AA and orientated to her name. other questions patient did not answer but did follow simple commands.       OBJECTIVE DATA:       Vital Signs Last 24 Hrs  T(C): 36.4 (10 Feb 2022 11:12), Max: 36.8 (10 Feb 2022 05:13)  T(F): 97.6 (10 Feb 2022 11:12), Max: 98.2 (10 Feb 2022 05:13)  HR: 64 (10 Feb 2022 11:12) (59 - 79)  BP: 103/72 (10 Feb 2022 11:12) (103/72 - 129/86)  BP(mean): --  RR: 18 (10 Feb 2022 11:12) (18 - 18)  SpO2: 96% (10 Feb 2022 11:12) (96% - 100%)           Daily     Daily   LABS:                        8.5    17.51 )-----------( 65       ( 09 Feb 2022 07:33 )             25.3             02-10    142  |  114<H>  |  21  ----------------------------<  107<H>  4.3   |  22  |  0.49<L>    Ca    7.7<L>      10 Feb 2022 10:34    TPro  6.6  /  Alb  1.8<L>  /  TBili  2.6<H>  /  DBili  x   /  AST  53<H>  /  ALT  106<H>  /  AlkPhos  267<H>  02-10                       CAPILLARY BLOOD GLUCOSE      POCT Blood Glucose.: 105 mg/dL (10 Feb 2022 11:24)      Culture - Blood (collected 02-09)  Source: .Blood Blood-Peripheral  Preliminary Report (02-10):    No growth to date.    Culture - Blood (collected 02-08)  Source: .Blood Blood-Peripheral  Preliminary Report (02-09):    No growth to date.    Culture - Blood (collected 02-07)  Source: .Blood Blood-Peripheral  Gram Stain (02-09):    Growth in aerobic and anaerobic bottles: Gram Negative Rods  Final Report (02-09):    Growth in aerobic and anaerobic bottles: Klebsiella aerogenes    See previous culture [id number]    Culture - Blood (collected 02-07)  Source: .Blood Blood-Peripheral  Gram Stain (02-09):    Growth in aerobic bottle:    Gram Negative Rods    Growth in anaerobic bottle: Gram Negative Rods  Final Report (02-09):    Growth in aerobic and anaerobic bottles: Klebsiella aerogenes    See previous culture [id number]    Culture - Urine (collected 02-07)  Source: Clean Catch Clean Catch (Midstream)  Final Report (02-09):    10,000 - 49,000 CFU/mL Klebsiella aerogenes  Organism: Enterobacter aerogenes (02-09)  Organism: Enterobacter aerogenes (02-09)    Sensitivities:      -  Amikacin: S <=16      -  Amoxicillin/Clavulanic Acid: R >16/8      -  Ampicillin: R >16 These ampicillin results predict results for amoxicillin      -  Ampicillin/Sulbactam: R >16/8 Enterobacter, Klebsiella aerogenes, Citrobacter, and Serratia may develop resistance during prolonged therapy (3-4 days)      -  Aztreonam: S <=4      -  Cefazolin: R >16      -  Cefepime: S <=2      -  Cefoxitin: R >16      -  Ceftriaxone: S <=1 Enterobacter, Klebsiella aerogenes, Citrobacter, and Serratia may develop resistance during prolonged therapy      -  Ciprofloxacin: S <=0.25      -  Ertapenem: S <=0.5      -  Gentamicin: S <=2      -  Imipenem: S <=1      -  Levofloxacin: S <=0.5      -  Meropenem: S <=1      -  Nitrofurantoin: I 64 Should not be used to treat pyelonephritis      -  Piperacillin/Tazobactam: S <=8      -  Tigecycline: S <=2      -  Tobramycin: S <=2      -  Trimethoprim/Sulfamethoxazole: S <=0.5/9.5      Method Type: NETO    Culture - Blood (collected 02-07)  Source: .Blood Blood-Peripheral  Gram Stain (02-09):    Growth in aerobic bottle: Gram Negative Rods    Growth in anaerobic bottle: Gram Negative Rods  Final Report (02-09):    Growth in aerobic and anaerobic bottles: Klebsiella aerogenes    ***Blood Panel PCR results on this specimen are available    approximately 3 hours after the Gram stain result.***    Gram stain, PCR, and/or culture results may not always    correspond due to difference in methodologies.    ************************************************************    This PCR assay was performed by multiplex PCR. This    Assay tests for 66 bacterial and resistance gene targets.    Please refer to the [facility] Labs test directory    at [email or web address] for details.  Organism: Blood Culture PCR  Enterobacter aerogenes (02-09)  Organism: Enterobacter aerogenes (02-09)    Sensitivities:      -  Amikacin: S <=16      -  Ampicillin: R >16 These ampicillin results predict results for amoxicillin      -  Ampicillin/Sulbactam: R >16/8 Enterobacter, Klebsiella aerogenes, Citrobacter, and Serratia may develop resistance during prolonged therapy (3-4 days)      -  Aztreonam: S <=4      -  Cefazolin: R >16 Enterobacter, Klebsiella aerogenes, Citrobacter, and Serratia may develop resistance during prolonged therapy (3-4 days)      -  Cefepime: S <=2      -  Cefoxitin: R >16      -  Ceftriaxone: S <=1 Enterobacter, Klebsiella aerogenes, Citrobacter, and Serratia may develop resistance during prolonged therapy      -  Ciprofloxacin: S <=0.25      -  Ertapenem: S <=0.5      -  Gentamicin: S <=2      -  Imipenem: S <=1      -  Levofloxacin: S <=0.5      -  Meropenem: S <=1      -  Piperacillin/Tazobactam: S <=8      -  Tobramycin: S <=2      -  Trimethoprim/Sulfamethoxazole: S <=0.5/9.5      Method Type: NETO  Organism: Blood Culture PCR (02-09)    Sensitivities:      -  Enterobacter (non-cloacae complex): Detec      Method Type: PCR    Culture - Blood (collected 02-07)  Source: .Blood Blood-Peripheral  Gram Stain (02-09):    Growth in aerobic and anaerobic bottles: Gram Negative Rods  Final Report (02-09):    Growth in aerobic and anaerobic bottles: Klebsiella aerogenes    See previous culture [id number]        MEDICATIONS  (STANDING):  dexAMETHasone  Injectable 4 milliGRAM(s) IV Push two times a day  dextrose 40% Gel 15 Gram(s) Oral once  dextrose 50% Injectable 25 Gram(s) IV Push once  dextrose 50% Injectable 12.5 Gram(s) IV Push once  dextrose 50% Injectable 25 Gram(s) IV Push once  glucagon  Injectable 1 milliGRAM(s) IntraMuscular once  meropenem  IVPB 1000 milliGRAM(s) IV Intermittent every 8 hours  metoprolol tartrate Injectable 5 milliGRAM(s) IV Push every 6 hours  montelukast  Chewable 5 milliGRAM(s) Oral daily  sodium chloride 0.9%. 1000 milliLiter(s) (100 mL/Hr) IV Continuous <Continuous>  valproic acid 250 milliGRAM(s) Oral two times a day    MEDICATIONS  (PRN):  melatonin 3 milliGRAM(s) Oral at bedtime PRN Insomnia  prochlorperazine   Tablet 10 milliGRAM(s) Oral every 6 hours PRN nausea, vomiting

## 2022-02-10 NOTE — PROGRESS NOTE ADULT - ASSESSMENT
HPI: 44 years old female with h/o asthma, NSCLC with known brain metastases, S/P XRT and chemotherapy at [facility] lung cancer S/P left craniotomy and resection of mass in 09/2021 (Dr Parviz Paul) present to ED with slurry speech started at 6AM, last known normal was 4AM  Tachycardic to 120s ( per patient, baseline HR in 120), BP stable, afebrile sat well at RA. WBC 13.36, K 3.5, Cr 1.17, AST/ALT 1207/1231, lipase normal, CK 78. CT head with Interval decompressive left frontotemporal craniectomy with decreased mass effect and resolved rightward midline shift secondary to significant left frontal vasogenic edema. No acute intracranial hemorrhage, extra-axial collection or new suspicious region of vasogenic edema. CTA head/neck with no vaso-occlusive disease. CT perfusion-26 mL of tissue with elevated time to maximum in the left superior medial frontal lobe, likely representing chronic posttreatment changes. Not a candidate for TPA.     Course:     brain mass lesion w/surrounding edema   present with slurry speech and slight right leg weakness  Not a candidate for TPA  CT head with Interval decompressive left frontotemporal craniectomy with decreased mass effect and resolved rightward midline shift secondary to significant left frontal vasogenic edema. No acute intracranial hemorrhage, extra-axial collection or new suspicious region of vasogenic edema. CTA head/neck with no vaso-occlusive disease. CT perfusion-26 mL of tissue with elevated time to maximum in the left superior medial frontal lobe, likely representing chronic posttreatment changes  Neurology following.   Cont decadrone.   No statin due to elevated LFT  No aspirin due to thrombocytopenia.   MRI brain with and without contrast ;  New postop changes are identified with a large area of abnormal enhancement seen involving the left frontal cortical subcortical region with increased surrounding edema mass effect on left lateral ventricle left-to-right shift. This finding is worrisome for progression of patient's underlying disease process  EEG noted  PT/OT/Speech eval passed; regular diet  2/6 CTH- no bleed    acute metabolic encephalopathy with sepsis with acute cholangitis with klebsiella bacteremia.    Follow HIT, fibrinogen, PT/PTT, serotonin releasing assay  s/p platelet and blood transfusions. H and H stable. platelet count better.  cont current antibiotic per ID.     Elevated LFTs. Transaminitis with CBD stone   AST/ALT 1207/1231  New abnormality. No history of ETOH, drug use, recent change in medications  Acute hepatitis panel   GI consulted- Dr Singh  reviewed ID and IR notes.   I discussed with Dr Oglesby at [facility] about this patient and she has accepted the patient for higher level of care. Pending insurance auth.     NSCLC metastatic to brain.   ·  Plan: NSCLC with known brain metastases, S/P XRT and chemotherapy at [facility] lung cancer S/P left craniotomy and resection of mass in 09/2021  On Tagrisso 80mg daily-  to bring home medication  Hematology/oncology consulted- Dr Sorenson.  Routine EEG with left sided dysfunction, no epileptogenic discharge  Continue with  Decadron 4mg BID for Brain edema  No need for Aspirin at this time    Mild intermittent asthma. Not in exacerbation. albuterol prn.    Full Code  Plan discussed with ID and Spouse Josesito.

## 2022-02-10 NOTE — PROGRESS NOTE ADULT - SUBJECTIVE AND OBJECTIVE BOX
lying in bed    Vital Signs Last 24 Hrs  T(C): 36.8 (10 Feb 2022 05:13), Max: 36.8 (10 Feb 2022 05:13)  T(F): 98.2 (10 Feb 2022 05:13), Max: 98.2 (10 Feb 2022 05:13)  HR: 79 (10 Feb 2022 05:13) (59 - 79)  BP: 111/76 (10 Feb 2022 05:13) (111/76 - 129/86)  BP(mean): --  RR: 18 (10 Feb 2022 05:13) (18 - 18)  SpO2: 100% (10 Feb 2022 05:13) (100% - 100%)    PHYSICAL EXAM:    general - weak looking +  HEENT - No Icterus  CVS - RRR  RS - AE B/L  Abd - soft, NT  Ext - Pulses +        LABS:                        8.5    17.51 )-----------( 65       ( 09 Feb 2022 07:33 )             25.3     02-09    144  |  115<H>  |  26<H>  ----------------------------<  170<H>  3.9   |  22  |  0.51    Ca    7.9<L>      09 Feb 2022 07:33    TPro  6.3  /  Alb  1.7<L>  /  TBili  3.8<H>  /  DBili  x   /  AST  67<H>  /  ALT  99<H>  /  AlkPhos  254<H>  02-09          Culture - Blood (collected 09 Feb 2022 00:56)  Source: .Blood Blood-Peripheral  Preliminary Report (10 Feb 2022 01:02):    No growth to date.    Culture - Blood (collected 08 Feb 2022 18:12)  Source: .Blood Blood-Peripheral  Preliminary Report (09 Feb 2022 19:02):    No growth to date.    Culture - Blood (collected 07 Feb 2022 15:02)  Source: .Blood Blood-Peripheral  Gram Stain (09 Feb 2022 09:27):    Growth in aerobic and anaerobic bottles: Gram Negative Rods  Final Report (09 Feb 2022 09:27):    Growth in aerobic and anaerobic bottles: Klebsiella aerogenes    See previous culture [id number]    Culture - Blood (collected 07 Feb 2022 15:02)  Source: .Blood Blood-Peripheral  Gram Stain (09 Feb 2022 10:15):    Growth in aerobic bottle:    Gram Negative Rods    Growth in anaerobic bottle: Gram Negative Rods  Final Report (09 Feb 2022 10:15):    Growth in aerobic and anaerobic bottles: Klebsiella aerogenes    See previous culture [id number]    Culture - Urine (collected 07 Feb 2022 09:07)  Source: Clean Catch Clean Catch (Midstream)  Final Report (09 Feb 2022 18:57):    10,000 - 49,000 CFU/mL Klebsiella aerogenes  Organism: Enterobacter aerogenes (09 Feb 2022 18:57)  Organism: Enterobacter aerogenes (09 Feb 2022 18:57)    Culture - Blood (collected 07 Feb 2022 00:35)  Source: .Blood Blood-Peripheral  Gram Stain (09 Feb 2022 09:00):    Growth in aerobic bottle: Gram Negative Rods    Growth in anaerobic bottle: Gram Negative Rods  Final Report (09 Feb 2022 09:00):    Growth in aerobic and anaerobic bottles: Klebsiella aerogenes    ***Blood Panel PCR results on this specimen are available    approximately 3 hours after the Gram stain result.***    Gram stain, PCR, and/or culture results may not always    correspond due to difference in methodologies.    ************************************************************    This PCR assay was performed by multiplex PCR. This    Assay tests for 66 bacterial and resistance gene targets.    Please refer to the [facility] Labs test directory    at [email or web address] for details.  Organism: Blood Culture PCR  Enterobacter aerogenes (09 Feb 2022 09:00)  Organism: Enterobacter aerogenes (09 Feb 2022 09:00)  Organism: Blood Culture PCR (09 Feb 2022 09:00)    Culture - Blood (collected 07 Feb 2022 00:35)  Source: .Blood Blood-Peripheral  Gram Stain (09 Feb 2022 10:33):    Growth in aerobic and anaerobic bottles: Gram Negative Rods  Final Report (09 Feb 2022 10:33):    Growth in aerobic and anaerobic bottles: Klebsiella aerogenes    See previous culture [id number]    Culture - Urine (collected 02 Feb 2022 18:54)  Source: Clean Catch Clean Catch (Midstream)  Final Report (05 Feb 2022 18:30):    >100,000 CFU/ml Proteus mirabilis    <10,000 CFU/ml Normal Urogenital manish present  Organism: Proteus mirabilis (05 Feb 2022 18:30)  Organism: Proteus mirabilis (05 Feb 2022 18:30)

## 2022-02-11 LAB
GLUCOSE BLDC GLUCOMTR-MCNC: 125 MG/DL — HIGH (ref 70–99)
GLUCOSE BLDC GLUCOMTR-MCNC: 97 MG/DL — SIGNIFICANT CHANGE UP (ref 70–99)

## 2022-02-11 PROCEDURE — 99232 SBSQ HOSP IP/OBS MODERATE 35: CPT

## 2022-02-11 RX ORDER — CEFTRIAXONE 500 MG/1
2000 INJECTION, POWDER, FOR SOLUTION INTRAMUSCULAR; INTRAVENOUS EVERY 24 HOURS
Refills: 0 | Status: COMPLETED | OUTPATIENT
Start: 2022-02-11 | End: 2022-02-18

## 2022-02-11 RX ORDER — ACETAMINOPHEN 500 MG
650 TABLET ORAL EVERY 6 HOURS
Refills: 0 | Status: DISCONTINUED | OUTPATIENT
Start: 2022-02-11 | End: 2022-02-28

## 2022-02-11 RX ADMIN — Medication 650 MILLIGRAM(S): at 15:37

## 2022-02-11 RX ADMIN — Medication 650 MILLIGRAM(S): at 16:37

## 2022-02-11 RX ADMIN — SODIUM CHLORIDE 100 MILLILITER(S): 9 INJECTION INTRAMUSCULAR; INTRAVENOUS; SUBCUTANEOUS at 00:16

## 2022-02-11 RX ADMIN — Medication 250 MILLIGRAM(S): at 05:26

## 2022-02-11 RX ADMIN — Medication 5 MILLIGRAM(S): at 05:26

## 2022-02-11 RX ADMIN — Medication 4 MILLIGRAM(S): at 05:27

## 2022-02-11 RX ADMIN — CEFTRIAXONE 100 MILLIGRAM(S): 500 INJECTION, POWDER, FOR SOLUTION INTRAMUSCULAR; INTRAVENOUS at 14:10

## 2022-02-11 RX ADMIN — MEROPENEM 100 MILLIGRAM(S): 1 INJECTION INTRAVENOUS at 05:27

## 2022-02-11 RX ADMIN — SODIUM CHLORIDE 100 MILLILITER(S): 9 INJECTION INTRAMUSCULAR; INTRAVENOUS; SUBCUTANEOUS at 21:17

## 2022-02-11 RX ADMIN — MONTELUKAST 5 MILLIGRAM(S): 4 TABLET, CHEWABLE ORAL at 12:46

## 2022-02-11 RX ADMIN — Medication 5 MILLIGRAM(S): at 00:13

## 2022-02-11 NOTE — PROGRESS NOTE ADULT - ASSESSMENT
44 years old female with h/o asthma, NSCLC with known brain metastases, S/P XRT and chemotherapy at [facility] lung cancer S/P left craniotomy and resection of mass in 09/2021 (Dr Parviz Paul) present to ED with slurry speech started at 6AM, last known normal was 4AM.  Reported 1-2 episode of vomiting last night. Per , patient has slight weakness on right leg.   Tachycardic to 120s.   CT head with Interval decompressive left frontotemporal craniectomy with decreased mass effect and resolved rightward midline shift secondary to significant left frontal vasogenic edema. No acute intracranial hemorrhage, extra-axial collection or new suspicious region of vasogenic edema.   CTA head/neck with no vaso-occlusive disease. CT perfusion-26 mL of tissue with elevated time to maximum in the left superior medial frontal lobe, likely representing chronic posttreatment changes. Not a candidate for TPA.   CT (I personally reviewed) possible acute cholangitis   blood cultures positive with enterobacter   high bili, thrombocytopenia, leukocytosis  MRCP small 6mm stone    she is on chemo as well radiation     2/8: afebrile, on RA, WBC high 28.82, BC growing enterobacter (not cloacae) isolated by PCR and 1/4 with Klebsiella aerogenes. Two new sets of BCs with gram negative rods, ID pending, two more sets are pending. The pt is s/p one dose of Amikacin yesterday, on IV Meropenem. Abd exam with diffuse tenderness.    Attending addendum:  agree with above  IR consult for perc cholecystomy   polymicrobial bacteremia likely from obstructed CBD stone   remains on steroids for vasogenic edema   continue meropenem  follow all culture sensitivities   continue to attempt to transfer patient to tertiary care center   2/9: initial BCs with Klebsiella and Enterobacter, Klebsiella sensitivity is pending, repeat BCs with growth, UC #1 with Proteus, UC #2 with Klebsiella, will continue with Meropenem for now without change. No fevers, WBC better 17.51, Cr ok, LFTs better. Pt is frustrated about her wait for the transfer.   2/11: no fevers, no new lab work, continues to refuse exam, repeat BCs with no growth, abx changed to ceftriaxone as per cultures sensitivity, discussed organism isolation with the lab, Enterobacter aerogens and Klebsiella aerogens - interchangable terms     sepsis due to cholangitis   leukocytosis   thrombocytopenia     Plan:  s/p one dose of amikacin 900mg on 2/7  stop meropenem 1g q8hrs   start ceftriaxone 2 grams IV q 24 hrs  patient needs ERCP but deemed not urgent  if ERCP is done please send cultures   follow all culture data   transfer to tertiary care center  if long delay in transfer, IR consult for percutaneous cholecystotomy   repeat blood cultures NGTD  trend platelets and transfuse as needed   maintain active type and screen   trend wbc   avoid any further doses of chemotherapy at this time during an active infection  44 years old female with h/o asthma, NSCLC with known brain metastases, S/P XRT and chemotherapy at [facility] lung cancer S/P left craniotomy and resection of mass in 09/2021 (Dr Parviz Paul) present to ED with slurry speech started at 6AM, last known normal was 4AM.  Reported 1-2 episode of vomiting last night. Per , patient has slight weakness on right leg.   Tachycardic to 120s.   CT head with Interval decompressive left frontotemporal craniectomy with decreased mass effect and resolved rightward midline shift secondary to significant left frontal vasogenic edema. No acute intracranial hemorrhage, extra-axial collection or new suspicious region of vasogenic edema.   CTA head/neck with no vaso-occlusive disease. CT perfusion-26 mL of tissue with elevated time to maximum in the left superior medial frontal lobe, likely representing chronic posttreatment changes. Not a candidate for TPA.   CT (I personally reviewed) possible acute cholangitis   blood cultures positive with enterobacter   high bili, thrombocytopenia, leukocytosis  MRCP small 6mm stone    she is on chemo as well radiation     2/8: afebrile, on RA, WBC high 28.82, BC growing enterobacter (not cloacae) isolated by PCR and 1/4 with Klebsiella aerogenes. Two new sets of BCs with gram negative rods, ID pending, two more sets are pending. The pt is s/p one dose of Amikacin yesterday, on IV Meropenem. Abd exam with diffuse tenderness.    Attending addendum:  agree with above  IR consult for perc cholecystomy   polymicrobial bacteremia likely from obstructed CBD stone   remains on steroids for vasogenic edema   continue meropenem  follow all culture sensitivities   continue to attempt to transfer patient to tertiary care center   2/9: initial BCs with Klebsiella and Enterobacter, Klebsiella sensitivity is pending, repeat BCs with growth, UC #1 with Proteus, UC #2 with Klebsiella, will continue with Meropenem for now without change. No fevers, WBC better 17.51, Cr ok, LFTs better. Pt is frustrated about her wait for the transfer.   2/11: no fevers, no new lab work, continues to refuse exam, repeat BCs with no growth, abx changed to ceftriaxone as per cultures sensitivity, discussed organism isolation with the lab, Enterobacter aerogens and Klebsiella aerogens - interchangable terms     sepsis due to cholangitis   leukocytosis   thrombocytopenia     Plan:  s/p one dose of amikacin 900mg on 2/7  stop meropenem 1g q8hrs   start ceftriaxone 2 grams IV q 24 hrs  patient needs ERCP but deemed not urgent  if ERCP is done please send cultures   follow all culture data   if long delay in transfer, IR consult for percutaneous cholecystotomy   repeat blood cultures NGTD  trend platelets and transfuse as needed   maintain active type and screen   trend wbc   avoid any further doses of chemotherapy at this time during an active infection

## 2022-02-11 NOTE — PROGRESS NOTE ADULT - SUBJECTIVE AND OBJECTIVE BOX
VENANCIO CHAIREZ  MRN-[id number]    Follow Up:  cholangitis, bacteremia    Interval History: The pt was seen and examined earlier, no distress, again, not interested in a conversation and refuses exam, awaiting for transfer. The pt is afebrile, no new lab work.     PAST MEDICAL & SURGICAL HISTORY:  No pertinent past medical history    Malignant neoplasm of lung, unspecified laterality, unspecified part of lung    Gastroesophageal reflux disease, esophagitis presence not specified    Asthma, unspecified asthma severity, unspecified whether complicated, unspecified whether persistent    H/O craniotomy    Brain mass        ROS:  denies any sob or chest pain, would not elaborate any further  [ ] Unobtainable because:  [x ] All other systems negative    Constitutional: no fever, no chills  Head: no trauma  Eyes: no vision changes, no eye pain  ENT:  no sore throat, no rhinorrhea  Cardiovascular:  no chest pain, no palpitation  Respiratory:  no SOB, no cough  GI:  no abd pain, no vomiting, no diarrhea  urinary: no dysuria, no hematuria, no flank pain  musculoskeletal:  no joint pain, no joint swelling  skin:  no rash  neurology:  no headache, no seizure, no change in mental status  psych: no anxiety, no depression         Allergies  codeine (Other)  latex (Rash)        ANTIMICROBIALS:  cefTRIAXone   IVPB 2000 every 24 hours      OTHER MEDS:  acetaminophen     Tablet .. 650 milliGRAM(s) Oral every 6 hours PRN  dexAMETHasone  Injectable 4 milliGRAM(s) IV Push two times a day  dextrose 40% Gel 15 Gram(s) Oral once  dextrose 50% Injectable 25 Gram(s) IV Push once  dextrose 50% Injectable 12.5 Gram(s) IV Push once  dextrose 50% Injectable 25 Gram(s) IV Push once  glucagon  Injectable 1 milliGRAM(s) IntraMuscular once  melatonin 3 milliGRAM(s) Oral at bedtime PRN  metoprolol tartrate Injectable 5 milliGRAM(s) IV Push every 6 hours  montelukast  Chewable 5 milliGRAM(s) Oral daily  polyethylene glycol 3350 17 Gram(s) Oral daily PRN  prochlorperazine   Tablet 10 milliGRAM(s) Oral every 6 hours PRN  sodium chloride 0.9%. 1000 milliLiter(s) IV Continuous <Continuous>  valproic acid 250 milliGRAM(s) Oral two times a day      Vital Signs Last 24 Hrs  T(C): 36.7 (11 Feb 2022 10:33), Max: 36.8 (11 Feb 2022 04:58)  T(F): 98 (11 Feb 2022 10:33), Max: 98.2 (11 Feb 2022 04:58)  HR: 66 (11 Feb 2022 10:33) (62 - 79)  BP: 104/75 (11 Feb 2022 10:33) (104/75 - 121/84)  BP(mean): --  RR: 18 (11 Feb 2022 10:33) (17 - 18)  SpO2: 100% (11 Feb 2022 10:33) (97% - 100%)    Physical Exam:  Constitutional: non-toxic, no distress, NC  HEAD/EYES: anicteric, no conjunctival injection, scleral icterus, left scalp deformity with large scar due to prior craniotomy and brain surgery  ENT:  supple  Cardiovascular:   refused exam  Respiratory:  refused exam  GI:  refused exam  :  refused exam  Musculoskeletal:  refused exam  Neurologic: awake and alert, at times does not want to answer questions  Skin:  no rash  Heme/Onc: refused exam  Psychiatric:  awake, alert, anxious and frustrated    WBC Count: 20.70 K/uL (02-10 @ 14:17)  WBC Count: 17.51 K/uL (02-09 @ 07:33)  WBC Count: 28.82 K/uL (02-08 @ 08:02)  WBC Count: 27.06 K/uL (02-07 @ 19:19)  WBC Count: 30.10 K/uL (02-07 @ 08:37)  WBC Count: 27.59 K/uL (02-06 @ 22:00)  WBC Count: 25.27 K/uL (02-06 @ 12:37)                            9.4    20.70 )-----------( 51       ( 10 Feb 2022 14:17 )             27.9       02-10    142  |  114<H>  |  21  ----------------------------<  107<H>  4.3   |  22  |  0.49<L>    Ca    7.7<L>      10 Feb 2022 10:34    TPro  6.6  /  Alb  1.8<L>  /  TBili  2.6<H>  /  DBili  x   /  AST  53<H>  /  ALT  106<H>  /  AlkPhos  267<H>  02-10          Creatinine Trend: 0.49<--, 0.51<--, 0.60<--, 0.63<--, 0.85<--, 0.84<--      MICROBIOLOGY:  v  .Blood Blood-Peripheral  02-09-22   No growth to date.  --  --      .Blood Blood-Peripheral  02-08-22   No growth to date.  --  --      .Blood Blood-Peripheral  02-07-22   Growth in aerobic and anaerobic bottles: Klebsiella aerogenes  See previous culture [id number]  --    Growth in aerobic bottle:  Gram Negative Rods  Growth in anaerobic bottle: Gram Negative Rods      Clean Catch Clean Catch (Midstream)  02-07-22   10,000 - 49,000 CFU/mL Klebsiella aerogenes  --  Enterobacter aerogenes      .Blood Blood-Peripheral  02-07-22   Growth in aerobic and anaerobic bottles: Klebsiella aerogenes  See previous culture [id number]  --  Blood Culture PCR  Enterobacter aerogenes      Clean Catch Clean Catch (Midstream)  02-02-22   >100,000 CFU/ml Proteus mirabilis  <10,000 CFU/ml Normal Urogenital manish present  --  Proteus mirabilis    Ferritin, Serum: 930 (02-03)      SARS-CoV-2 Result: NotDetec (02-07-22 @ 14:30)    COVID-19 PCR: NotDetec (03 Sep 2021 22:43)    RADIOLOGY:

## 2022-02-11 NOTE — PROGRESS NOTE ADULT - PROBLEM SELECTOR PLAN 1
-  continue Abx per ID  - for [facility] or [facility]  - Decadron for Brain edema  - Prognosis Guarded  - tagrisso on hold  - f/u platelet count

## 2022-02-11 NOTE — PROGRESS NOTE ADULT - SUBJECTIVE AND OBJECTIVE BOX
Lying in bed    Vital Signs Last 24 Hrs  T(C): 36.8 (11 Feb 2022 04:58), Max: 36.8 (11 Feb 2022 04:58)  T(F): 98.2 (11 Feb 2022 04:58), Max: 98.2 (11 Feb 2022 04:58)  HR: 66 (11 Feb 2022 04:58) (62 - 84)  BP: 119/75 (11 Feb 2022 04:58) (103/72 - 130/84)  BP(mean): --  RR: 17 (11 Feb 2022 04:58) (17 - 18)  SpO2: 100% (11 Feb 2022 04:58) (96% - 100%)    PHYSICAL EXAM:    general - weak looking +  HEENT - No Icterus  CVS - RRR  RS - AE B/L  Abd - soft, NT  Ext - Pulses +        LABS:                        9.4    20.70 )-----------( 51       ( 10 Feb 2022 14:17 )             27.9     02-10    142  |  114<H>  |  21  ----------------------------<  107<H>  4.3   |  22  |  0.49<L>    Ca    7.7<L>      10 Feb 2022 10:34    TPro  6.6  /  Alb  1.8<L>  /  TBili  2.6<H>  /  DBili  x   /  AST  53<H>  /  ALT  106<H>  /  AlkPhos  267<H>  02-10          Culture - Blood (collected 09 Feb 2022 00:56)  Source: .Blood Blood-Peripheral  Preliminary Report (10 Feb 2022 01:02):    No growth to date.    Culture - Blood (collected 08 Feb 2022 18:12)  Source: .Blood Blood-Peripheral  Preliminary Report (09 Feb 2022 19:02):    No growth to date.    Culture - Blood (collected 07 Feb 2022 15:02)  Source: .Blood Blood-Peripheral  Gram Stain (09 Feb 2022 09:27):    Growth in aerobic and anaerobic bottles: Gram Negative Rods  Final Report (09 Feb 2022 09:27):    Growth in aerobic and anaerobic bottles: Klebsiella aerogenes    See previous culture [id number]    Culture - Blood (collected 07 Feb 2022 15:02)  Source: .Blood Blood-Peripheral  Gram Stain (09 Feb 2022 10:15):    Growth in aerobic bottle:    Gram Negative Rods    Growth in anaerobic bottle: Gram Negative Rods  Final Report (09 Feb 2022 10:15):    Growth in aerobic and anaerobic bottles: Klebsiella aerogenes    See previous culture [id number]    Culture - Urine (collected 07 Feb 2022 09:07)  Source: Clean Catch Clean Catch (Midstream)  Final Report (09 Feb 2022 18:57):    10,000 - 49,000 CFU/mL Klebsiella aerogenes  Organism: Enterobacter aerogenes (09 Feb 2022 18:57)  Organism: Enterobacter aerogenes (09 Feb 2022 18:57)    Culture - Blood (collected 07 Feb 2022 00:35)  Source: .Blood Blood-Peripheral  Gram Stain (09 Feb 2022 09:00):    Growth in aerobic bottle: Gram Negative Rods    Growth in anaerobic bottle: Gram Negative Rods  Final Report (09 Feb 2022 09:00):    Growth in aerobic and anaerobic bottles: Klebsiella aerogenes    ***Blood Panel PCR results on this specimen are available    approximately 3 hours after the Gram stain result.***    Gram stain, PCR, and/or culture results may not always    correspond due to difference in methodologies.    ************************************************************    This PCR assay was performed by multiplex PCR. This    Assay tests for 66 bacterial and resistance gene targets.    Please refer to the [facility] Labs test directory    at [email or web address] for details.  Organism: Blood Culture PCR  Enterobacter aerogenes (09 Feb 2022 09:00)  Organism: Enterobacter aerogenes (09 Feb 2022 09:00)  Organism: Blood Culture PCR (09 Feb 2022 09:00)    Culture - Blood (collected 07 Feb 2022 00:35)  Source: .Blood Blood-Peripheral  Gram Stain (09 Feb 2022 10:33):    Growth in aerobic and anaerobic bottles: Gram Negative Rods  Final Report (09 Feb 2022 10:33):    Growth in aerobic and anaerobic bottles: Klebsiella aerogenes    See previous culture [id number]    Culture - Urine (collected 02 Feb 2022 18:54)  Source: Clean Catch Clean Catch (Midstream)  Final Report (05 Feb 2022 18:30):    >100,000 CFU/ml Proteus mirabilis    <10,000 CFU/ml Normal Urogenital manish present  Organism: Proteus mirabilis (05 Feb 2022 18:30)  Organism: Proteus mirabilis (05 Feb 2022 18:30)

## 2022-02-11 NOTE — PROGRESS NOTE ADULT - ASSESSMENT
HPI: 44 years old female with h/o asthma, NSCLC with known brain metastases, S/P XRT and chemotherapy at [facility] lung cancer S/P left craniotomy and resection of mass in 09/2021 (Dr Parviz Paul) present to ED with slurry speech started at 6AM, last known normal was 4AM  Tachycardic to 120s ( per patient, baseline HR in 120), BP stable, afebrile sat well at RA. WBC 13.36, K 3.5, Cr 1.17, AST/ALT 1207/1231, lipase normal, CK 78. CT head with Interval decompressive left frontotemporal craniectomy with decreased mass effect and resolved rightward midline shift secondary to significant left frontal vasogenic edema. No acute intracranial hemorrhage, extra-axial collection or new suspicious region of vasogenic edema. CTA head/neck with no vaso-occlusive disease. CT perfusion-26 mL of tissue with elevated time to maximum in the left superior medial frontal lobe, likely representing chronic posttreatment changes. Not a candidate for TPA.     Course:     Brain mass lesion w/surrounding edema   present with slurry speech and slight right leg weakness  Not a candidate for TPA  CT head with Interval decompressive left frontotemporal craniectomy with decreased mass effect and resolved rightward midline shift secondary to significant left frontal vasogenic edema. No acute intracranial hemorrhage, extra-axial collection or new suspicious region of vasogenic edema. CTA head/neck with no vaso-occlusive disease. CT perfusion-26 mL of tissue with elevated time to maximum in the left superior medial frontal lobe, likely representing chronic posttreatment changes  Neurology following.   Cont decadrone.   No statin due to elevated LFT  No aspirin due to thrombocytopenia.   MRI brain with and without contrast ;  New postop changes are identified with a large area of abnormal enhancement seen involving the left frontal cortical subcortical region with increased surrounding edema mass effect on left lateral ventricle left-to-right shift. This finding is worrisome for progression of patient's underlying disease process  EEG noted  PT/OT/Speech eval passed; regular diet  2/6 CTH- no bleed    acute metabolic encephalopathy with sepsis with acute cholangitis with klebsiella bacteremia.    Follow HIT, fibrinogen, PT/PTT, serotonin releasing assay  s/p platelet and blood transfusions. H and H stable. Last platelet count 51.  cont current antibiotic.    Elevated LFTs. Transaminitis with CBD stone   AST/ALT 1207/1231  New abnormality. No history of ETOH, drug use, recent change in medications  Acute hepatitis panel   GI consulted- Dr Singh  reviewed ID and IR notes.   I discussed with Dr Oglesby at [facility] about this patient and she has accepted the patient for higher level of care.     NSCLC metastatic to brain.   ·  Plan: NSCLC with known brain metastases, S/P XRT and chemotherapy at [facility] lung cancer S/P left craniotomy and resection of mass in 09/2021  On Tagrisso 80mg daily at home.  Hematology/oncology consulted- Dr Sorenson.  Routine EEG with left sided dysfunction, no epileptogenic discharge  Continue with  Decadron 4mg BID for Brain edema  No need for Aspirin at this time    Mild intermittent asthma. Not in exacerbation. albuterol prn.    Full Code  Plan discussed with ID.  Care manager Amparo from [facility] called and updated me that patient has been accepted pending surgical bed availability and insurance auth.      HPI: 44 years old female with h/o asthma, NSCLC with known brain metastases, S/P XRT and chemotherapy at [facility] lung cancer S/P left craniotomy and resection of mass in 09/2021 (Dr Parviz Paul) present to ED with slurry speech started at 6AM, last known normal was 4AM  Tachycardic to 120s ( per patient, baseline HR in 120), BP stable, afebrile sat well at RA. WBC 13.36, K 3.5, Cr 1.17, AST/ALT 1207/1231, lipase normal, CK 78. CT head with Interval decompressive left frontotemporal craniectomy with decreased mass effect and resolved rightward midline shift secondary to significant left frontal vasogenic edema. No acute intracranial hemorrhage, extra-axial collection or new suspicious region of vasogenic edema. CTA head/neck with no vaso-occlusive disease. CT perfusion-26 mL of tissue with elevated time to maximum in the left superior medial frontal lobe, likely representing chronic posttreatment changes. Not a candidate for TPA.     Course:     Brain mass lesion w/surrounding edema   present with slurry speech and slight right leg weakness  Not a candidate for TPA  CT head with Interval decompressive left frontotemporal craniectomy with decreased mass effect and resolved rightward midline shift secondary to significant left frontal vasogenic edema. No acute intracranial hemorrhage, extra-axial collection or new suspicious region of vasogenic edema. CTA head/neck with no vaso-occlusive disease. CT perfusion-26 mL of tissue with elevated time to maximum in the left superior medial frontal lobe, likely representing chronic posttreatment changes  Neurology following.   Cont decadrone.   No statin due to elevated LFT  No aspirin due to thrombocytopenia.   MRI brain with and without contrast ;  New postop changes are identified with a large area of abnormal enhancement seen involving the left frontal cortical subcortical region with increased surrounding edema mass effect on left lateral ventricle left-to-right shift. This finding is worrisome for progression of patient's underlying disease process  EEG noted  PT/OT/Speech eval passed; regular diet  2/6 CTH- no bleed    acute metabolic encephalopathy with sepsis with acute cholangitis with klebsiella bacteremia.    Follow HIT, fibrinogen, PT/PTT, serotonin releasing assay  s/p platelet and blood transfusions. H and H stable. Last platelet count 51.  ID changed antibiotic to ceftriaxone.    Elevated LFTs. Transaminitis with CBD stone   AST/ALT 1207/1231  New abnormality. No history of ETOH, drug use, recent change in medications  Acute hepatitis panel   GI consulted- Dr Singh  reviewed ID and IR notes.   I discussed with Dr Oglesby at [facility] about this patient and she has accepted the patient for higher level of care.     NSCLC metastatic to brain.   ·  Plan: NSCLC with known brain metastases, S/P XRT and chemotherapy at [facility] lung cancer S/P left craniotomy and resection of mass in 09/2021  On Tagrisso 80mg daily at home.  Hematology/oncology consulted- Dr Sorenson.  Routine EEG with left sided dysfunction, no epileptogenic discharge  Continue with  Decadron 4mg BID for Brain edema  No need for Aspirin at this time    Mild intermittent asthma. Not in exacerbation. albuterol prn.    Full Code  Plan discussed with ID.  Care manager Amparo from [facility] called and updated me that patient has been accepted pending surgical bed availability and insurance auth.

## 2022-02-11 NOTE — PROGRESS NOTE ADULT - SUBJECTIVE AND OBJECTIVE BOX
CHIEF COMPLAINT: Follow up of sepsis with acute cholangitis and bacteremia in an immunocompromised patient with lung cancer  no report of any fever or vomiting or active gross bleeding.   no diarrhea  no new overnight events     PHYSICAL EXAM:    GENERAL: Moderately built, no acute distress   CHEST/LUNG: Clear to ausculation bilaterally, no wheezing, no crackles   HEART: Regular rate and rhythm; No murmurs, rubs  ABDOMEN: patient refused abd exam.   EXTREMITIES:  , No clubbing, cyanosis, or edema  NERVOUS SYSTEM:  Limited exam due to poor participation.   Psychiatry: AA and orientated to her name. other questions patient did not answer but did follow simple commands.       OBJECTIVE DATA:       Vital Signs Last 24 Hrs  T(C): 36.7 (2022 10:33), Max: 36.8 (2022 04:58)  T(F): 98 (2022 10:33), Max: 98.2 (2022 04:58)  HR: 66 (2022 10:33) (62 - 79)  BP: 104/75 (2022 10:33) (104/75 - 121/84)  BP(mean): --  RR: 18 (2022 10:33) (17 - 18)  SpO2: 100% (2022 10:33) (97% - 100%)           Daily     Daily Weight in k.3 (2022 04:58)  LABS:                        9.4    20.70 )-----------( 51       ( 10 Feb 2022 14:17 )             27.9             02-10    142  |  114<H>  |  21  ----------------------------<  107<H>  4.3   |  22  |  0.49<L>    Ca    7.7<L>      10 Feb 2022 10:34    TPro  6.6  /  Alb  1.8<L>  /  TBili  2.6<H>  /  DBili  x   /  AST  53<H>  /  ALT  106<H>  /  AlkPhos  267<H>  02-10                       CAPILLARY BLOOD GLUCOSE      POCT Blood Glucose.: 97 mg/dL (2022 05:20)      Culture - Blood (collected )  Source: .Blood Blood-Peripheral  Preliminary Report (02-10):    No growth to date.    Culture - Blood (collected )  Source: .Blood Blood-Peripheral  Preliminary Report ():    No growth to date.    Culture - Blood (collected )  Source: .Blood Blood-Peripheral  Gram Stain ():    Growth in aerobic and anaerobic bottles: Gram Negative Rods  Final Report ():    Growth in aerobic and anaerobic bottles: Klebsiella aerogenes    See previous culture [id number]    Culture - Blood (collected )  Source: .Blood Blood-Peripheral  Gram Stain ():    Growth in aerobic bottle:    Gram Negative Rods    Growth in anaerobic bottle: Gram Negative Rods  Final Report ():    Growth in aerobic and anaerobic bottles: Klebsiella aerogenes    See previous culture [id number]    Culture - Urine (collected )  Source: Clean Catch Clean Catch (Midstream)  Final Report ():    10,000 - 49,000 CFU/mL Klebsiella aerogenes  Organism: Enterobacter aerogenes ()  Organism: Enterobacter aerogenes ()    Sensitivities:      -  Amikacin: S <=16      -  Amoxicillin/Clavulanic Acid: R >16/8      -  Ampicillin: R >16 These ampicillin results predict results for amoxicillin      -  Ampicillin/Sulbactam: R >16/8 Enterobacter, Klebsiella aerogenes, Citrobacter, and Serratia may develop resistance during prolonged therapy (3-4 days)      -  Aztreonam: S <=4      -  Cefazolin: R >16      -  Cefepime: S <=2      -  Cefoxitin: R >16      -  Ceftriaxone: S <=1 Enterobacter, Klebsiella aerogenes, Citrobacter, and Serratia may develop resistance during prolonged therapy      -  Ciprofloxacin: S <=0.25      -  Ertapenem: S <=0.5      -  Gentamicin: S <=2      -  Imipenem: S <=1      -  Levofloxacin: S <=0.5      -  Meropenem: S <=1      -  Nitrofurantoin: I 64 Should not be used to treat pyelonephritis      -  Piperacillin/Tazobactam: S <=8      -  Tigecycline: S <=2      -  Tobramycin: S <=2      -  Trimethoprim/Sulfamethoxazole: S <=0.5/9.5      Method Type: NETO    Culture - Blood (collected )  Source: .Blood Blood-Peripheral  Gram Stain ():    Growth in aerobic bottle: Gram Negative Rods    Growth in anaerobic bottle: Gram Negative Rods  Final Report ():    Growth in aerobic and anaerobic bottles: Klebsiella aerogenes    ***Blood Panel PCR results on this specimen are available    approximately 3 hours after the Gram stain result.***    Gram stain, PCR, and/or culture results may not always    correspond due to difference in methodologies.    ************************************************************    This PCR assay was performed by multiplex PCR. This    Assay tests for 66 bacterial and resistance gene targets.    Please refer to the [facility] test directory    at [email or web address] for details.  Organism: Blood Culture PCR  Enterobacter aerogenes ()  Organism: Enterobacter aerogenes ()    Sensitivities:      -  Amikacin: S <=16      -  Ampicillin: R >16 These ampicillin results predict results for amoxicillin      -  Ampicillin/Sulbactam: R >16/8 Enterobacter, Klebsiella aerogenes, Citrobacter, and Serratia may develop resistance during prolonged therapy (3-4 days)      -  Aztreonam: S <=4      -  Cefazolin: R >16 Enterobacter, Klebsiella aerogenes, Citrobacter, and Serratia may develop resistance during prolonged therapy (3-4 days)      -  Cefepime: S <=2      -  Cefoxitin: R >16      -  Ceftriaxone: S <=1 Enterobacter, Klebsiella aerogenes, Citrobacter, and Serratia may develop resistance during prolonged therapy      -  Ciprofloxacin: S <=0.25      -  Ertapenem: S <=0.5      -  Gentamicin: S <=2      -  Imipenem: S <=1      -  Levofloxacin: S <=0.5      -  Meropenem: S <=1      -  Piperacillin/Tazobactam: S <=8      -  Tobramycin: S <=2      -  Trimethoprim/Sulfamethoxazole: S <=0.5/9.5      Method Type: NETO  Organism: Blood Culture PCR ()    Sensitivities:      -  Enterobacter (non-cloacae complex): Detec      Method Type: PCR    Culture - Blood (collected )  Source: .Blood Blood-Peripheral  Gram Stain ():    Growth in aerobic and anaerobic bottles: Gram Negative Rods  Final Report ():    Growth in aerobic and anaerobic bottles: Klebsiella aerogenes    See previous culture [id number]      MEDICATIONS  (STANDING):  cefTRIAXone   IVPB 2000 milliGRAM(s) IV Intermittent every 24 hours  dexAMETHasone  Injectable 4 milliGRAM(s) IV Push two times a day  dextrose 40% Gel 15 Gram(s) Oral once  dextrose 50% Injectable 25 Gram(s) IV Push once  dextrose 50% Injectable 12.5 Gram(s) IV Push once  dextrose 50% Injectable 25 Gram(s) IV Push once  glucagon  Injectable 1 milliGRAM(s) IntraMuscular once  metoprolol tartrate Injectable 5 milliGRAM(s) IV Push every 6 hours  montelukast  Chewable 5 milliGRAM(s) Oral daily  sodium chloride 0.9%. 1000 milliLiter(s) (100 mL/Hr) IV Continuous <Continuous>  valproic acid 250 milliGRAM(s) Oral two times a day    MEDICATIONS  (PRN):  acetaminophen     Tablet .. 650 milliGRAM(s) Oral every 6 hours PRN Moderate Pain (4 - 6)  melatonin 3 milliGRAM(s) Oral at bedtime PRN Insomnia  polyethylene glycol 3350 17 Gram(s) Oral daily PRN Constipation  prochlorperazine   Tablet 10 milliGRAM(s) Oral every 6 hours PRN nausea, vomiting

## 2022-02-12 LAB
ALBUMIN SERPL ELPH-MCNC: 1.5 G/DL — LOW (ref 3.3–5)
ALP SERPL-CCNC: 204 U/L — HIGH (ref 40–120)
ALT FLD-CCNC: 65 U/L — SIGNIFICANT CHANGE UP (ref 12–78)
ANION GAP SERPL CALC-SCNC: 5 MMOL/L — SIGNIFICANT CHANGE UP (ref 5–17)
AST SERPL-CCNC: 24 U/L — SIGNIFICANT CHANGE UP (ref 15–37)
BILIRUB SERPL-MCNC: 1.3 MG/DL — HIGH (ref 0.2–1.2)
BUN SERPL-MCNC: 8 MG/DL — SIGNIFICANT CHANGE UP (ref 7–23)
CALCIUM SERPL-MCNC: 6.3 MG/DL — CRITICAL LOW (ref 8.5–10.1)
CHLORIDE SERPL-SCNC: 120 MMOL/L — HIGH (ref 96–108)
CO2 SERPL-SCNC: 19 MMOL/L — LOW (ref 22–31)
CREAT SERPL-MCNC: 0.35 MG/DL — LOW (ref 0.5–1.3)
GLUCOSE BLDC GLUCOMTR-MCNC: 100 MG/DL — HIGH (ref 70–99)
GLUCOSE BLDC GLUCOMTR-MCNC: 107 MG/DL — HIGH (ref 70–99)
GLUCOSE BLDC GLUCOMTR-MCNC: 133 MG/DL — HIGH (ref 70–99)
GLUCOSE BLDC GLUCOMTR-MCNC: 78 MG/DL — SIGNIFICANT CHANGE UP (ref 70–99)
GLUCOSE BLDC GLUCOMTR-MCNC: 88 MG/DL — SIGNIFICANT CHANGE UP (ref 70–99)
GLUCOSE SERPL-MCNC: 114 MG/DL — HIGH (ref 70–99)
HCT VFR BLD CALC: 23.1 % — LOW (ref 34.5–45)
HGB BLD-MCNC: 7.7 G/DL — LOW (ref 11.5–15.5)
MCHC RBC-ENTMCNC: 29.1 PG — SIGNIFICANT CHANGE UP (ref 27–34)
MCHC RBC-ENTMCNC: 33.3 G/DL — SIGNIFICANT CHANGE UP (ref 32–36)
MCV RBC AUTO: 87.2 FL — SIGNIFICANT CHANGE UP (ref 80–100)
NRBC # BLD: 0 /100 WBCS — SIGNIFICANT CHANGE UP (ref 0–0)
PLATELET # BLD AUTO: 291 K/UL — SIGNIFICANT CHANGE UP (ref 150–400)
POTASSIUM SERPL-MCNC: 3.5 MMOL/L — SIGNIFICANT CHANGE UP (ref 3.5–5.3)
POTASSIUM SERPL-SCNC: 3.5 MMOL/L — SIGNIFICANT CHANGE UP (ref 3.5–5.3)
PROT SERPL-MCNC: 5.4 GM/DL — LOW (ref 6–8.3)
RBC # BLD: 2.65 M/UL — LOW (ref 3.8–5.2)
RBC # FLD: 17.2 % — HIGH (ref 10.3–14.5)
SODIUM SERPL-SCNC: 144 MMOL/L — SIGNIFICANT CHANGE UP (ref 135–145)
WBC # BLD: 18.35 K/UL — HIGH (ref 3.8–10.5)
WBC # FLD AUTO: 18.35 K/UL — HIGH (ref 3.8–10.5)

## 2022-02-12 PROCEDURE — 99232 SBSQ HOSP IP/OBS MODERATE 35: CPT

## 2022-02-12 RX ADMIN — Medication 4 MILLIGRAM(S): at 06:07

## 2022-02-12 RX ADMIN — Medication 4 MILLIGRAM(S): at 17:42

## 2022-02-12 RX ADMIN — Medication 250 MILLIGRAM(S): at 06:07

## 2022-02-12 RX ADMIN — SODIUM CHLORIDE 100 MILLILITER(S): 9 INJECTION INTRAMUSCULAR; INTRAVENOUS; SUBCUTANEOUS at 23:25

## 2022-02-12 RX ADMIN — Medication 250 MILLIGRAM(S): at 17:42

## 2022-02-12 RX ADMIN — Medication 5 MILLIGRAM(S): at 06:07

## 2022-02-12 RX ADMIN — CEFTRIAXONE 100 MILLIGRAM(S): 500 INJECTION, POWDER, FOR SOLUTION INTRAMUSCULAR; INTRAVENOUS at 13:41

## 2022-02-12 RX ADMIN — MONTELUKAST 5 MILLIGRAM(S): 4 TABLET, CHEWABLE ORAL at 12:13

## 2022-02-12 RX ADMIN — SODIUM CHLORIDE 100 MILLILITER(S): 9 INJECTION INTRAMUSCULAR; INTRAVENOUS; SUBCUTANEOUS at 17:43

## 2022-02-12 RX ADMIN — SODIUM CHLORIDE 100 MILLILITER(S): 9 INJECTION INTRAMUSCULAR; INTRAVENOUS; SUBCUTANEOUS at 11:31

## 2022-02-12 NOTE — PROGRESS NOTE ADULT - PROBLEM SELECTOR PLAN 1
- awaiting transfer to [facility]   - Abx per ID  - check CBC  - Decadron for brain edema

## 2022-02-12 NOTE — PROGRESS NOTE ADULT - SUBJECTIVE AND OBJECTIVE BOX
lying in bed	    Vital Signs Last 24 Hrs  T(C): 36.5 (12 Feb 2022 04:52), Max: 37.1 (12 Feb 2022 00:04)  T(F): 97.7 (12 Feb 2022 04:52), Max: 98.7 (12 Feb 2022 00:04)  HR: 87 (12 Feb 2022 04:52) (66 - 91)  BP: 111/77 (12 Feb 2022 04:52) (104/75 - 129/84)  BP(mean): --  RR: 18 (12 Feb 2022 04:52) (18 - 19)  SpO2: 99% (12 Feb 2022 04:52) (97% - 100%)    PHYSICAL EXAM:    general - weak looking +  HEENT - No Icterus  CVS - RRR  RS - AE B/L  Abd - soft, NT  Ext - Pulses +        LABS:                        9.4    20.70 )-----------( 51       ( 10 Feb 2022 14:17 )             27.9     02-10    142  |  114<H>  |  21  ----------------------------<  107<H>  4.3   |  22  |  0.49<L>    Ca    7.7<L>      10 Feb 2022 10:34    TPro  6.6  /  Alb  1.8<L>  /  TBili  2.6<H>  /  DBili  x   /  AST  53<H>  /  ALT  106<H>  /  AlkPhos  267<H>  02-10          Culture - Blood (collected 09 Feb 2022 00:56)  Source: .Blood Blood-Peripheral  Preliminary Report (10 Feb 2022 01:02):    No growth to date.    Culture - Blood (collected 08 Feb 2022 18:12)  Source: .Blood Blood-Peripheral  Preliminary Report (09 Feb 2022 19:02):    No growth to date.    Culture - Blood (collected 07 Feb 2022 15:02)  Source: .Blood Blood-Peripheral  Gram Stain (09 Feb 2022 09:27):    Growth in aerobic and anaerobic bottles: Gram Negative Rods  Final Report (09 Feb 2022 09:27):    Growth in aerobic and anaerobic bottles: Klebsiella aerogenes    See previous culture [id number]    Culture - Blood (collected 07 Feb 2022 15:02)  Source: .Blood Blood-Peripheral  Gram Stain (09 Feb 2022 10:15):    Growth in aerobic bottle:    Gram Negative Rods    Growth in anaerobic bottle: Gram Negative Rods  Final Report (09 Feb 2022 10:15):    Growth in aerobic and anaerobic bottles: Klebsiella aerogenes    See previous culture [id number]    Culture - Urine (collected 07 Feb 2022 09:07)  Source: Clean Catch Clean Catch (Midstream)  Final Report (09 Feb 2022 18:57):    10,000 - 49,000 CFU/mL Klebsiella aerogenes  Organism: Enterobacter aerogenes (09 Feb 2022 18:57)  Organism: Enterobacter aerogenes (09 Feb 2022 18:57)    Culture - Blood (collected 07 Feb 2022 00:35)  Source: .Blood Blood-Peripheral  Gram Stain (09 Feb 2022 09:00):    Growth in aerobic bottle: Gram Negative Rods    Growth in anaerobic bottle: Gram Negative Rods  Final Report (09 Feb 2022 09:00):    Growth in aerobic and anaerobic bottles: Klebsiella aerogenes    ***Blood Panel PCR results on this specimen are available    approximately 3 hours after the Gram stain result.***    Gram stain, PCR, and/or culture results may not always    correspond due to difference in methodologies.    ************************************************************    This PCR assay was performed by multiplex PCR. This    Assay tests for 66 bacterial and resistance gene targets.    Please refer to the [facility] Labs test directory    at [email or web address] for details.  Organism: Blood Culture PCR  Enterobacter aerogenes (09 Feb 2022 09:00)  Organism: Enterobacter aerogenes (09 Feb 2022 09:00)  Organism: Blood Culture PCR (09 Feb 2022 09:00)    Culture - Blood (collected 07 Feb 2022 00:35)  Source: .Blood Blood-Peripheral  Gram Stain (09 Feb 2022 10:33):    Growth in aerobic and anaerobic bottles: Gram Negative Rods  Final Report (09 Feb 2022 10:33):    Growth in aerobic and anaerobic bottles: Klebsiella aerogenes    See previous culture [id number]    Culture - Urine (collected 02 Feb 2022 18:54)  Source: Clean Catch Clean Catch (Midstream)  Final Report (05 Feb 2022 18:30):    >100,000 CFU/ml Proteus mirabilis    <10,000 CFU/ml Normal Urogenital manihs present  Organism: Proteus mirabilis (05 Feb 2022 18:30)  Organism: Proteus mirabilis (05 Feb 2022 18:30)

## 2022-02-12 NOTE — PROGRESS NOTE ADULT - SUBJECTIVE AND OBJECTIVE BOX
CHIEF COMPLAINT: Follow up of sepsis with acute cholangitis and bacteremia in an immunocompromised patient with lung cancer  no report of any fever or vomiting or active gross bleeding.   no diarrhea  refused labs     PHYSICAL EXAM:    GENERAL: Moderately built, no acute distress   CHEST/LUNG: Clear to ausculation bilaterally, no wheezing, no crackles   HEART: RRR, no murmur  ABDOMEN: patient refused abd exam.   EXTREMITIES:  , No clubbing, cyanosis, or edema  NERVOUS SYSTEM:  Limited exam due to poor participation.   Psychiatry: AA and orientated to her name. other questions patient did not answer but did follow simple commands.       OBJECTIVE DATA:       Vital Signs Last 24 Hrs  T(C): 36.5 (2022 04:52), Max: 37.1 (2022 00:04)  T(F): 97.7 (2022 04:52), Max: 98.7 (2022 00:04)  HR: 87 (2022 04:52) (66 - 91)  BP: 111/77 (2022 04:52) (104/75 - 129/84)  BP(mean): --  RR: 18 (2022 04:52) (18 - 19)  SpO2: 99% (2022 04:52) (97% - 100%)           Daily     Daily Weight in k.5 (2022 04:52)  LABS:                        9.4    20.70 )-----------( 51       ( 10 Feb 2022 14:17 )             27.9             02-10    142  |  114<H>  |  21  ----------------------------<  107<H>  4.3   |  22  |  0.49<L>    Ca    7.7<L>      10 Feb 2022 10:34    TPro  6.6  /  Alb  1.8<L>  /  TBili  2.6<H>  /  DBili  x   /  AST  53<H>  /  ALT  106<H>  /  AlkPhos  267<H>  02-10                       CAPILLARY BLOOD GLUCOSE      POCT Blood Glucose.: 88 mg/dL (2022 05:26)      Culture - Blood (collected )  Source: .Blood Blood-Peripheral  Preliminary Report (02-10):    No growth to date.    Culture - Blood (collected )  Source: .Blood Blood-Peripheral  Preliminary Report ():    No growth to date.    Culture - Blood (collected )  Source: .Blood Blood-Peripheral  Gram Stain ():    Growth in aerobic and anaerobic bottles: Gram Negative Rods  Final Report ():    Growth in aerobic and anaerobic bottles: Klebsiella aerogenes    See previous culture [id number]    Culture - Blood (collected )  Source: .Blood Blood-Peripheral  Gram Stain ():    Growth in aerobic bottle:    Gram Negative Rods    Growth in anaerobic bottle: Gram Negative Rods  Final Report ():    Growth in aerobic and anaerobic bottles: Klebsiella aerogenes    See previous culture [id number]      MEDICATIONS  (STANDING):  cefTRIAXone   IVPB 2000 milliGRAM(s) IV Intermittent every 24 hours  dexAMETHasone  Injectable 4 milliGRAM(s) IV Push two times a day  dextrose 40% Gel 15 Gram(s) Oral once  dextrose 50% Injectable 25 Gram(s) IV Push once  dextrose 50% Injectable 12.5 Gram(s) IV Push once  dextrose 50% Injectable 25 Gram(s) IV Push once  glucagon  Injectable 1 milliGRAM(s) IntraMuscular once  metoprolol tartrate Injectable 5 milliGRAM(s) IV Push every 6 hours  montelukast  Chewable 5 milliGRAM(s) Oral daily  sodium chloride 0.9%. 1000 milliLiter(s) (100 mL/Hr) IV Continuous <Continuous>  valproic acid 250 milliGRAM(s) Oral two times a day    MEDICATIONS  (PRN):  acetaminophen     Tablet .. 650 milliGRAM(s) Oral every 6 hours PRN Moderate Pain (4 - 6)  melatonin 3 milliGRAM(s) Oral at bedtime PRN Insomnia  polyethylene glycol 3350 17 Gram(s) Oral daily PRN Constipation  prochlorperazine   Tablet 10 milliGRAM(s) Oral every 6 hours PRN nausea, vomiting

## 2022-02-12 NOTE — PROGRESS NOTE ADULT - ASSESSMENT
HPI: 44 years old female with h/o asthma, NSCLC with known brain metastases, S/P XRT and chemotherapy at [facility] lung cancer S/P left craniotomy and resection of mass in 09/2021 (Dr Parviz Paul) present to ED with slurry speech started at 6AM, last known normal was 4AM  Tachycardic to 120s ( per patient, baseline HR in 120), BP stable, afebrile sat well at RA. WBC 13.36, K 3.5, Cr 1.17, AST/ALT 1207/1231, lipase normal, CK 78. CT head with Interval decompressive left frontotemporal craniectomy with decreased mass effect and resolved rightward midline shift secondary to significant left frontal vasogenic edema. No acute intracranial hemorrhage, extra-axial collection or new suspicious region of vasogenic edema. CTA head/neck with no vaso-occlusive disease. CT perfusion-26 mL of tissue with elevated time to maximum in the left superior medial frontal lobe, likely representing chronic posttreatment changes. Not a candidate for TPA.     Course:     Brain mass lesion w/surrounding edema   present with slurry speech and slight right leg weakness  Not a candidate for TPA  CT head with Interval decompressive left frontotemporal craniectomy with decreased mass effect and resolved rightward midline shift secondary to significant left frontal vasogenic edema. No acute intracranial hemorrhage, extra-axial collection or new suspicious region of vasogenic edema. CTA head/neck with no vaso-occlusive disease. CT perfusion-26 mL of tissue with elevated time to maximum in the left superior medial frontal lobe, likely representing chronic posttreatment changes  Neurology following.   Cont decadrone.   No statin due to elevated LFT  No aspirin due to thrombocytopenia.   MRI brain with and without contrast ;  New postop changes are identified with a large area of abnormal enhancement seen involving the left frontal cortical subcortical region with increased surrounding edema mass effect on left lateral ventricle left-to-right shift. This finding is worrisome for progression of patient's underlying disease process  EEG noted  PT/OT/Speech eval passed; regular diet  2/6 CTH- no bleed    acute metabolic encephalopathy with sepsis with acute cholangitis with klebsiella bacteremia.    Follow HIT, fibrinogen, PT/PTT, serotonin releasing assay  s/p platelet and blood transfusions. H and H stable. Last platelet count 51.  Cont iv ceftriaxone.  discussed about importance of trending labs. but patient not sure about blood work yet    Elevated LFTs. Transaminitis with CBD stone   AST/ALT 1207/1231  New abnormality. No history of ETOH, drug use, recent change in medications  Acute hepatitis panel   GI consulted- Dr Singh  reviewed ID and IR notes.   I discussed with Dr Oglesby at [facility] about this patient and she has accepted the patient for higher level of care but unfortunately still pending insurance authorization and surgical bed availability.     NSCLC metastatic to brain.   ·  Plan: NSCLC with known brain metastases, S/P XRT and chemotherapy at [facility] lung cancer S/P left craniotomy and resection of mass in 09/2021  On Tagrisso 80mg daily at home.  Hematology/oncology consulted- Dr Sorenson.  Routine EEG with left sided dysfunction, no epileptogenic discharge  Continue with  Decadron 4mg BID for Brain edema  No need for Aspirin at this time    Mild intermittent asthma. Not in exacerbation. albuterol prn.    Full Code  Plan discussed with ID.  Care manager Amparo from [facility] called and updated me that patient has been accepted pending surgical bed availability and insurance auth.      HPI: 44 years old female with h/o asthma, NSCLC with known brain metastases, S/P XRT and chemotherapy at [facility] lung cancer S/P left craniotomy and resection of mass in 09/2021 (Dr Parviz Palu) present to ED with slurry speech started at 6AM, last known normal was 4AM  Tachycardic to 120s ( per patient, baseline HR in 120), BP stable, afebrile sat well at RA. WBC 13.36, K 3.5, Cr 1.17, AST/ALT 1207/1231, lipase normal, CK 78. CT head with Interval decompressive left frontotemporal craniectomy with decreased mass effect and resolved rightward midline shift secondary to significant left frontal vasogenic edema. No acute intracranial hemorrhage, extra-axial collection or new suspicious region of vasogenic edema. CTA head/neck with no vaso-occlusive disease. CT perfusion-26 mL of tissue with elevated time to maximum in the left superior medial frontal lobe, likely representing chronic posttreatment changes. Not a candidate for TPA.     Course:     Brain mass lesion w/surrounding edema   present with slurry speech and slight right leg weakness  Not a candidate for TPA  CT head with Interval decompressive left frontotemporal craniectomy with decreased mass effect and resolved rightward midline shift secondary to significant left frontal vasogenic edema. No acute intracranial hemorrhage, extra-axial collection or new suspicious region of vasogenic edema. CTA head/neck with no vaso-occlusive disease. CT perfusion-26 mL of tissue with elevated time to maximum in the left superior medial frontal lobe, likely representing chronic posttreatment changes  Neurology following.   Cont decadrone.   No statin due to elevated LFT  No aspirin due to thrombocytopenia.   MRI brain with and without contrast ;  New postop changes are identified with a large area of abnormal enhancement seen involving the left frontal cortical subcortical region with increased surrounding edema mass effect on left lateral ventricle left-to-right shift. This finding is worrisome for progression of patient's underlying disease process  EEG noted  PT/OT/Speech eval passed; regular diet  2/6 CTH- no bleed    acute metabolic encephalopathy with sepsis with acute cholangitis with klebsiella bacteremia.    Follow HIT, fibrinogen, PT/PTT, serotonin releasing assay  s/p platelet and blood transfusions. H and H stable. Last platelet count 51.  Cont iv ceftriaxone.  discussed about importance of trending labs. but patient not sure about blood work yet    Elevated LFTs. Transaminitis with CBD stone   AST/ALT 1207/1231  New abnormality. No history of ETOH, drug use, recent change in medications  Acute hepatitis panel   GI consulted- Dr Singh  reviewed ID and IR notes.   I discussed with Dr Oglesby at [facility] about this patient and she has accepted the patient for higher level of care but unfortunately still pending insurance authorization and surgical bed availability.     NSCLC metastatic to brain.   ·  Plan: NSCLC with known brain metastases, S/P XRT and chemotherapy at [facility] lung cancer S/P left craniotomy and resection of mass in 09/2021  On Tagrisso 80mg daily at home.  Hematology/oncology consulted- Dr Sorenson.  Routine EEG with left sided dysfunction, no epileptogenic discharge  Continue with  Decadron 4mg BID for Brain edema  No need for Aspirin at this time    Mild intermittent asthma. Not in exacerbation. albuterol prn.    Hypocalcemia after correction for hypoalbuminemia is 8.3.     Full Code  Plan discussed with ID.  Care manager Amparo from [facility] called and updated me that patient has been accepted pending surgical bed availability and insurance auth.

## 2022-02-13 LAB
CULTURE RESULTS: SIGNIFICANT CHANGE UP
GLUCOSE BLDC GLUCOMTR-MCNC: 101 MG/DL — HIGH (ref 70–99)
GLUCOSE BLDC GLUCOMTR-MCNC: 118 MG/DL — HIGH (ref 70–99)
OB PNL STL: NEGATIVE — SIGNIFICANT CHANGE UP
SPECIMEN SOURCE: SIGNIFICANT CHANGE UP

## 2022-02-13 PROCEDURE — 99232 SBSQ HOSP IP/OBS MODERATE 35: CPT

## 2022-02-13 RX ORDER — PANTOPRAZOLE SODIUM 20 MG/1
40 TABLET, DELAYED RELEASE ORAL
Refills: 0 | Status: DISCONTINUED | OUTPATIENT
Start: 2022-02-13 | End: 2022-02-28

## 2022-02-13 RX ADMIN — Medication 5 MILLIGRAM(S): at 00:18

## 2022-02-13 RX ADMIN — MONTELUKAST 5 MILLIGRAM(S): 4 TABLET, CHEWABLE ORAL at 12:14

## 2022-02-13 RX ADMIN — CEFTRIAXONE 100 MILLIGRAM(S): 500 INJECTION, POWDER, FOR SOLUTION INTRAMUSCULAR; INTRAVENOUS at 14:36

## 2022-02-13 RX ADMIN — Medication 5 MILLIGRAM(S): at 12:11

## 2022-02-13 NOTE — PROVIDER CONTACT NOTE (CRITICAL VALUE NOTIFICATION) - PERSON GIVING RESULT:
Antonella Harvey [facility]
Radha
Dante
Dante
Jovani Gonzalez
[facility]
Patrick Zaman
[facility]
ja chin
[facility]

## 2022-02-13 NOTE — PROGRESS NOTE ADULT - SUBJECTIVE AND OBJECTIVE BOX
lying in bed      Vital Signs Last 24 Hrs  T(C): 36.4 (13 Feb 2022 10:51), Max: 36.7 (12 Feb 2022 17:18)  T(F): 97.6 (13 Feb 2022 10:51), Max: 98 (12 Feb 2022 17:18)  HR: 81 (13 Feb 2022 10:51) (68 - 82)  BP: 115/81 (13 Feb 2022 10:51) (100/69 - 132/87)  BP(mean): --  RR: 18 (13 Feb 2022 10:51) (18 - 18)  SpO2: 100% (13 Feb 2022 10:51) (99% - 100%)    PHYSICAL EXAM:    general - Weak looking +  HEENT - No Icterus  CVS - RRR  RS - AE B/L  Abd - soft, NT  Ext - Pulses +        LABS:                        7.7    18.35 )-----------( 291      ( 12 Feb 2022 11:28 )             23.1     02-12    144  |  120<H>  |  8   ----------------------------<  114<H>  3.5   |  19<L>  |  0.35<L>    Ca    6.3<LL>      12 Feb 2022 11:28    TPro  5.4<L>  /  Alb  1.5<L>  /  TBili  1.3<H>  /  DBili  x   /  AST  24  /  ALT  65  /  AlkPhos  204<H>  02-12          Culture - Blood (collected 09 Feb 2022 00:56)  Source: .Blood Blood-Peripheral  Gram Stain (prelim) (13 Feb 2022 08:13):    Growth in aerobic bottle: Gram Negative Rods  Preliminary Report (13 Feb 2022 08:13):    Growth in aerobic bottle: Gram Negative Rods    Culture - Blood (collected 08 Feb 2022 18:12)  Source: .Blood Blood-Peripheral  Preliminary Report (09 Feb 2022 19:02):    No growth to date.    Culture - Blood (collected 07 Feb 2022 15:02)  Source: .Blood Blood-Peripheral  Gram Stain (09 Feb 2022 09:27):    Growth in aerobic and anaerobic bottles: Gram Negative Rods  Final Report (09 Feb 2022 09:27):    Growth in aerobic and anaerobic bottles: Klebsiella aerogenes    See previous culture [id number]    Culture - Blood (collected 07 Feb 2022 15:02)  Source: .Blood Blood-Peripheral  Gram Stain (09 Feb 2022 10:15):    Growth in aerobic bottle:    Gram Negative Rods    Growth in anaerobic bottle: Gram Negative Rods  Final Report (09 Feb 2022 10:15):    Growth in aerobic and anaerobic bottles: Klebsiella aerogenes    See previous culture [id number]    Culture - Urine (collected 07 Feb 2022 09:07)  Source: Clean Catch Clean Catch (Midstream)  Final Report (09 Feb 2022 18:57):    10,000 - 49,000 CFU/mL Klebsiella aerogenes  Organism: Enterobacter aerogenes (09 Feb 2022 18:57)  Organism: Enterobacter aerogenes (09 Feb 2022 18:57)    Culture - Blood (collected 07 Feb 2022 00:35)  Source: .Blood Blood-Peripheral  Gram Stain (09 Feb 2022 09:00):    Growth in aerobic bottle: Gram Negative Rods    Growth in anaerobic bottle: Gram Negative Rods  Final Report (09 Feb 2022 09:00):    Growth in aerobic and anaerobic bottles: Klebsiella aerogenes    ***Blood Panel PCR results on this specimen are available    approximately 3 hours after the Gram stain result.***    Gram stain, PCR, and/or culture results may not always    correspond due to difference in methodologies.    ************************************************************    This PCR assay was performed by multiplex PCR. This    Assay tests for 66 bacterial and resistance gene targets.    Please refer to the [facility] test directory    at [email or web address] for details.  Organism: Blood Culture PCR  Enterobacter aerogenes (09 Feb 2022 09:00)  Organism: Enterobacter aerogenes (09 Feb 2022 09:00)  Organism: Blood Culture PCR (09 Feb 2022 09:00)    Culture - Blood (collected 07 Feb 2022 00:35)  Source: .Blood Blood-Peripheral  Gram Stain (09 Feb 2022 10:33):    Growth in aerobic and anaerobic bottles: Gram Negative Rods  Final Report (09 Feb 2022 10:33):    Growth in aerobic and anaerobic bottles: Klebsiella aerogenes    See previous culture [id number]

## 2022-02-13 NOTE — PROVIDER CONTACT NOTE (CRITICAL VALUE NOTIFICATION) - TEST AND RESULT REPORTED:
2 set of blood cultures drawn 02/07 preliminiary and gram stain results positive for growth in aerobic and anaerobic bottles
Preliminary blood culture + growth aerobic bottle gram - rods
Blood C/S drawn 2/7/22 show growth in arobic bottle gram negative emil
Blood culture positive for growth in aerobic and anaerobic bottle Gram negative rods
Platelets 19,000
Blood culture drawn yesterday- Growth in Anaerobic bottle Gram negative rods
Blood culture positive for groeth in Aerobic and Anaerobic bottle gram negative rods
Calcium 6.3
Platelet 5
platelets 17

## 2022-02-13 NOTE — PROVIDER CONTACT NOTE (CRITICAL VALUE NOTIFICATION) - NAME OF MD/NP/PA/DO NOTIFIED:
DR mendez
Dr Maher
Dr. Gerardo
Martine Corley
TANVIR Vargas
Dr. Gerardo
SAURAV Oliveros
dr mendez
Priscilla RAMEY
TANVIR Vargas

## 2022-02-13 NOTE — PROGRESS NOTE ADULT - ASSESSMENT
HPI: 44 years old female with h/o asthma, NSCLC with known brain metastases, S/P XRT and chemotherapy at [facility] lung cancer S/P left craniotomy and resection of mass in 09/2021 (Dr Parviz Paul) present to ED with slurry speech started at 6AM, last known normal was 4AM  Tachycardic to 120s ( per patient, baseline HR in 120), BP stable, afebrile sat well at RA. WBC 13.36, K 3.5, Cr 1.17, AST/ALT 1207/1231, lipase normal, CK 78. CT head with Interval decompressive left frontotemporal craniectomy with decreased mass effect and resolved rightward midline shift secondary to significant left frontal vasogenic edema. No acute intracranial hemorrhage, extra-axial collection or new suspicious region of vasogenic edema. CTA head/neck with no vaso-occlusive disease. CT perfusion-26 mL of tissue with elevated time to maximum in the left superior medial frontal lobe, likely representing chronic posttreatment changes. Not a candidate for TPA.     Course:     Brain mass lesion w/surrounding edema   present with slurry speech and slight right leg weakness  Not a candidate for TPA  CT head with Interval decompressive left frontotemporal craniectomy with decreased mass effect and resolved rightward midline shift secondary to significant left frontal vasogenic edema. No acute intracranial hemorrhage, extra-axial collection or new suspicious region of vasogenic edema. CTA head/neck with no vaso-occlusive disease. CT perfusion-26 mL of tissue with elevated time to maximum in the left superior medial frontal lobe, likely representing chronic posttreatment changes  Neurology following.   Cont decadrone.   No statin due to elevated LFT  No aspirin due to thrombocytopenia.   MRI brain with and without contrast ;  New postop changes are identified with a large area of abnormal enhancement seen involving the left frontal cortical subcortical region with increased surrounding edema mass effect on left lateral ventricle left-to-right shift. This finding is worrisome for progression of patient's underlying disease process  EEG noted  PT/OT/Speech eval passed; regular diet  2/6 CTH- no bleed    acute metabolic encephalopathy with sepsis with acute cholangitis with klebsiella bacteremia.    Follow HIT, fibrinogen, PT/PTT, serotonin releasing assay  s/p platelet and blood transfusions. H and H stable. Last platelet count 51.  Cont iv ceftriaxone.  discussed about importance of trending labs. but patient not sure about blood work yet    Elevated LFTs. Transaminitis with CBD stone   AST/ALT 1207/1231  New abnormality. No history of ETOH, drug use, recent change in medications  Acute hepatitis panel   GI consulted- Dr Singh  reviewed ID and IR notes.   I discussed with Dr Oglesby at [facility] about this patient and she has accepted the patient for higher level of care but unfortunately still pending insurance authorization and surgical bed availability.     NSCLC metastatic to brain.   ·  Plan: NSCLC with known brain metastases, S/P XRT and chemotherapy at [facility] lung cancer S/P left craniotomy and resection of mass in 09/2021  On Tagrisso 80mg daily at home.  Hematology/oncology consulted- Dr Sorenson.  Routine EEG with left sided dysfunction, no epileptogenic discharge  Continue with  Decadron 4mg BID for Brain edema  No need for Aspirin at this time    Mild intermittent asthma. Not in exacerbation. albuterol prn.    Hypocalcemia after correction for hypoalbuminemia is 8.3.     Anemia. no active gross bleeding reported. Pending CBC today. counseled about blood work. Per nurse, will try in presence of .     Full Code  Care manager Amparo from [facility] called and updated me that patient has been accepted pending surgical bed availability and insurance auth.

## 2022-02-13 NOTE — PROGRESS NOTE ADULT - PROBLEM SELECTOR PLAN 1
- still awaiting transfer to [facility]  - Decadron  - Abx per ID  - physical therapy

## 2022-02-13 NOTE — PROGRESS NOTE ADULT - SUBJECTIVE AND OBJECTIVE BOX
CHIEF COMPLAINT: Follow up of sepsis with acute cholangitis and bacteremia in an immunocompromised patient with lung cancer  Patient is refusing blood work this morning     PHYSICAL EXAM:    GENERAL: Moderately built, no acute distress   CHEST/LUNG: Clear to ausculation bilaterally, no wheezing, no crackles   HEART: RRR, no murmur  ABDOMEN: patient refused abd exam.   EXTREMITIES:  , No clubbing, cyanosis, or edema  NERVOUS SYSTEM:  Limited exam due to poor participation.   Psychiatry: AA and orientated to her name. other questions patient did not answer but did follow simple commands.       OBJECTIVE DATA:       Vital Signs Last 24 Hrs  T(C): 36.4 (13 Feb 2022 10:51), Max: 36.7 (12 Feb 2022 17:18)  T(F): 97.6 (13 Feb 2022 10:51), Max: 98 (12 Feb 2022 17:18)  HR: 81 (13 Feb 2022 10:51) (68 - 82)  BP: 115/81 (13 Feb 2022 10:51) (100/69 - 132/87)  BP(mean): --  RR: 18 (13 Feb 2022 10:51) (18 - 18)  SpO2: 100% (13 Feb 2022 10:51) (99% - 100%)           Daily     Daily   LABS:                        7.7    18.35 )-----------( 291      ( 12 Feb 2022 11:28 )             23.1             02-12    144  |  120<H>  |  8   ----------------------------<  114<H>  3.5   |  19<L>  |  0.35<L>    Ca    6.3<LL>      12 Feb 2022 11:28    TPro  5.4<L>  /  Alb  1.5<L>  /  TBili  1.3<H>  /  DBili  x   /  AST  24  /  ALT  65  /  AlkPhos  204<H>  02-12                       CAPILLARY BLOOD GLUCOSE      POCT Blood Glucose.: 101 mg/dL (13 Feb 2022 12:17)      Culture - Blood (collected 02-09)  Source: .Blood Blood-Peripheral  Gram Stain (prelim) (02-13):    Growth in aerobic bottle: Gram Negative Rods  Preliminary Report (02-13):    Growth in aerobic bottle: Gram Negative Rods    Culture - Blood (collected 02-08)  Source: .Blood Blood-Peripheral  Preliminary Report (02-09):    No growth to date.        MEDICATIONS  (STANDING):  cefTRIAXone   IVPB 2000 milliGRAM(s) IV Intermittent every 24 hours  dexAMETHasone  Injectable 4 milliGRAM(s) IV Push two times a day  dextrose 40% Gel 15 Gram(s) Oral once  dextrose 50% Injectable 25 Gram(s) IV Push once  dextrose 50% Injectable 12.5 Gram(s) IV Push once  dextrose 50% Injectable 25 Gram(s) IV Push once  glucagon  Injectable 1 milliGRAM(s) IntraMuscular once  metoprolol tartrate Injectable 5 milliGRAM(s) IV Push every 6 hours  montelukast  Chewable 5 milliGRAM(s) Oral daily  pantoprazole    Tablet 40 milliGRAM(s) Oral before breakfast  valproic acid 250 milliGRAM(s) Oral two times a day    MEDICATIONS  (PRN):  acetaminophen     Tablet .. 650 milliGRAM(s) Oral every 6 hours PRN Moderate Pain (4 - 6)  melatonin 3 milliGRAM(s) Oral at bedtime PRN Insomnia  polyethylene glycol 3350 17 Gram(s) Oral daily PRN Constipation  prochlorperazine   Tablet 10 milliGRAM(s) Oral every 6 hours PRN nausea, vomiting

## 2022-02-13 NOTE — PROVIDER CONTACT NOTE (CRITICAL VALUE NOTIFICATION) - ACTION/TREATMENT ORDERED:
Taken by Svetlana JUNIOR on 2 C for Marina Dc RN  Called out to  to notify of results.  Awaiting callback. Taken by Svetlana JUNIOR on 2 C for Marina Dc RN  Called out to  to notify of results.  Awaiting callback.  Notified Priscilla RAMEY

## 2022-02-14 LAB
CULTURE RESULTS: SIGNIFICANT CHANGE UP
GLUCOSE BLDC GLUCOMTR-MCNC: 95 MG/DL — SIGNIFICANT CHANGE UP (ref 70–99)
GRAM STN FLD: SIGNIFICANT CHANGE UP
SPECIMEN SOURCE: SIGNIFICANT CHANGE UP

## 2022-02-14 PROCEDURE — 99232 SBSQ HOSP IP/OBS MODERATE 35: CPT

## 2022-02-14 RX ORDER — VALPROIC ACID (AS SODIUM SALT) 250 MG/5ML
250 SOLUTION, ORAL ORAL EVERY 12 HOURS
Refills: 0 | Status: DISCONTINUED | OUTPATIENT
Start: 2022-02-14 | End: 2022-02-28

## 2022-02-14 RX ADMIN — MONTELUKAST 5 MILLIGRAM(S): 4 TABLET, CHEWABLE ORAL at 12:09

## 2022-02-14 RX ADMIN — Medication 4 MILLIGRAM(S): at 20:20

## 2022-02-14 RX ADMIN — Medication 26.25 MILLIGRAM(S): at 20:20

## 2022-02-14 RX ADMIN — CEFTRIAXONE 100 MILLIGRAM(S): 500 INJECTION, POWDER, FOR SOLUTION INTRAMUSCULAR; INTRAVENOUS at 22:36

## 2022-02-14 NOTE — PROGRESS NOTE ADULT - SUBJECTIVE AND OBJECTIVE BOX
VENANCIO CHAIREZ  MRN-[id number]    Follow Up:  bacteremia, cholangitis     Interval History: the pt was seen and examined earlier, resting in her bed, no distress, continues to refuse exam, continues to refuse lab work, refused repeat blood cultures. The pt is afebrile, on RA.       PAST MEDICAL & SURGICAL HISTORY:  No pertinent past medical history    Malignant neoplasm of lung, unspecified laterality, unspecified part of lung    Gastroesophageal reflux disease, esophagitis presence not specified    Asthma, unspecified asthma severity, unspecified whether complicated, unspecified whether persistent    H/O craniotomy    Brain mass        ROS:    [x ] Unobtainable because: pt refuses to answer any questions, says NO to everything   [ ] All other systems negative    Constitutional: no fever, no chills  Head: no trauma  Eyes: no vision changes, no eye pain  ENT:  no sore throat, no rhinorrhea  Cardiovascular:  no chest pain, no palpitation  Respiratory:  no SOB, no cough  GI:  no abd pain, no vomiting, no diarrhea  urinary: no dysuria, no hematuria, no flank pain  musculoskeletal:  no joint pain, no joint swelling  skin:  no rash  neurology:  no headache, no seizure, no change in mental status  psych: no anxiety, no depression         Allergies  codeine (Other)  latex (Rash)        ANTIMICROBIALS:  cefTRIAXone   IVPB 2000 every 24 hours      OTHER MEDS:  acetaminophen     Tablet .. 650 milliGRAM(s) Oral every 6 hours PRN  dexAMETHasone  Injectable 4 milliGRAM(s) IV Push two times a day  dextrose 40% Gel 15 Gram(s) Oral once  dextrose 50% Injectable 25 Gram(s) IV Push once  dextrose 50% Injectable 12.5 Gram(s) IV Push once  dextrose 50% Injectable 25 Gram(s) IV Push once  glucagon  Injectable 1 milliGRAM(s) IntraMuscular once  melatonin 3 milliGRAM(s) Oral at bedtime PRN  metoprolol tartrate Injectable 5 milliGRAM(s) IV Push every 6 hours  montelukast  Chewable 5 milliGRAM(s) Oral daily  pantoprazole    Tablet 40 milliGRAM(s) Oral before breakfast  polyethylene glycol 3350 17 Gram(s) Oral daily PRN  prochlorperazine   Tablet 10 milliGRAM(s) Oral every 6 hours PRN  valproate sodium IVPB 250 milliGRAM(s) IV Intermittent every 12 hours      Vital Signs Last 24 Hrs  T(C): 36.7 (14 Feb 2022 11:55), Max: 36.9 (14 Feb 2022 05:20)  T(F): 98.1 (14 Feb 2022 11:55), Max: 98.5 (14 Feb 2022 05:20)  HR: 92 (14 Feb 2022 11:55) (89 - 92)  BP: 99/74 (14 Feb 2022 11:55) (95/58 - 99/74)  BP(mean): --  RR: 16 (14 Feb 2022 11:55) (16 - 18)  SpO2: 98% (14 Feb 2022 11:55) (98% - 100%)    Physical Exam:  Constitutional: non-toxic, no distress  HEAD/EYES: anicteric, no conjunctival injection, scleral icterus, left scalp deformity with large scar due to prior craniotomy and brain surgery  ENT:  supple  Cardiovascular:   refused exam  Respiratory:  refused exam  GI:  refused exam  :  refused exam  Musculoskeletal:  refused exam, moves her extremities   Neurologic: awake and alert, at times does not want to answer questions  Skin:  refused exam  Heme/Onc: refused exam  Psychiatric:  awake, alert, anxious and frustrated    WBC Count: 18.35 K/uL (02-12 @ 11:28)  WBC Count: 20.70 K/uL (02-10 @ 14:17)  WBC Count: 17.51 K/uL (02-09 @ 07:33)  WBC Count: 28.82 K/uL (02-08 @ 08:02)  WBC Count: 27.06 K/uL (02-07 @ 19:19)    Creatinine Trend: 0.35<--, 0.49<--, 0.51<--, 0.60<--, 0.63<--, 0.85<--      MICROBIOLOGY:  v  .Blood Blood-Peripheral  02-09-22   Growth in aerobic bottle: Klebsiella aerogenes  See previous culture [id number]  --    Growth in aerobic bottle: Gram Negative Rods      .Blood Blood-Peripheral  02-08-22   No Growth Final  --  --      .Blood Blood-Peripheral  02-07-22   Growth in aerobic and anaerobic bottles: Klebsiella aerogenes  See previous culture [id number]  --    Growth in aerobic bottle:  Gram Negative Rods  Growth in anaerobic bottle: Gram Negative Rods      Clean Catch Clean Catch (Midstream)  02-07-22   10,000 - 49,000 CFU/mL Klebsiella aerogenes  --  Enterobacter aerogenes      .Blood Blood-Peripheral  02-07-22   Growth in aerobic and anaerobic bottles: Klebsiella aerogenes  See previous culture [id number]  --  Blood Culture PCR  Enterobacter aerogenes      Clean Catch Clean Catch (Midstream)  02-02-22   >100,000 CFU/ml Proteus mirabilis  <10,000 CFU/ml Normal Urogenital manish present  --  Proteus mirabilis    Ferritin, Serum: 930 (02-03)      COVID-19 PCR: NotDetec (03 Sep 2021 22:43)    RADIOLOGY:

## 2022-02-14 NOTE — PROVIDER CONTACT NOTE (OTHER) - SITUATION
Pt currently has 1 IV access that is no longer patent and has been refusing labs. Pt and son are currently agreeable to getting IV access if its done via US

## 2022-02-14 NOTE — PROGRESS NOTE ADULT - SUBJECTIVE AND OBJECTIVE BOX
lying in bed    Vital Signs Last 24 Hrs  T(C): 36.9 (14 Feb 2022 05:20), Max: 36.9 (14 Feb 2022 05:20)  T(F): 98.5 (14 Feb 2022 05:20), Max: 98.5 (14 Feb 2022 05:20)  HR: 91 (14 Feb 2022 05:20) (81 - 91)  BP: 95/58 (14 Feb 2022 05:20) (95/58 - 115/81)  BP(mean): --  RR: 18 (14 Feb 2022 05:20) (18 - 18)  SpO2: 100% (14 Feb 2022 05:20) (100% - 100%)    PHYSICAL EXAM:    general - weak looking +  HEENT - No Icterus  CVS - RRR  RS - AE B/L  Abd - soft, NT  Ext - Pulses +        LABS:                        7.7    18.35 )-----------( 291      ( 12 Feb 2022 11:28 )             23.1     02-12    144  |  120<H>  |  8   ----------------------------<  114<H>  3.5   |  19<L>  |  0.35<L>    Ca    6.3<LL>      12 Feb 2022 11:28    TPro  5.4<L>  /  Alb  1.5<L>  /  TBili  1.3<H>  /  DBili  x   /  AST  24  /  ALT  65  /  AlkPhos  204<H>  02-12          Culture - Blood (collected 09 Feb 2022 00:56)  Source: .Blood Blood-Peripheral  Gram Stain (prelim) (13 Feb 2022 08:13):    Growth in aerobic bottle: Gram Negative Rods  Preliminary Report (13 Feb 2022 08:13):    Growth in aerobic bottle: Gram Negative Rods    Culture - Blood (collected 08 Feb 2022 18:12)  Source: .Blood Blood-Peripheral  Final Report (13 Feb 2022 19:00):    No Growth Final    Culture - Blood (collected 07 Feb 2022 15:02)  Source: .Blood Blood-Peripheral  Gram Stain (09 Feb 2022 09:27):    Growth in aerobic and anaerobic bottles: Gram Negative Rods  Final Report (09 Feb 2022 09:27):    Growth in aerobic and anaerobic bottles: Klebsiella aerogenes    See previous culture [id number]    Culture - Blood (collected 07 Feb 2022 15:02)  Source: .Blood Blood-Peripheral  Gram Stain (09 Feb 2022 10:15):    Growth in aerobic bottle:    Gram Negative Rods    Growth in anaerobic bottle: Gram Negative Rods  Final Report (09 Feb 2022 10:15):    Growth in aerobic and anaerobic bottles: Klebsiella aerogenes    See previous culture [id number]    Culture - Urine (collected 07 Feb 2022 09:07)  Source: Clean Catch Clean Catch (Midstream)  Final Report (09 Feb 2022 18:57):    10,000 - 49,000 CFU/mL Klebsiella aerogenes  Organism: Enterobacter aerogenes (09 Feb 2022 18:57)  Organism: Enterobacter aerogenes (09 Feb 2022 18:57)    Culture - Blood (collected 07 Feb 2022 00:35)  Source: .Blood Blood-Peripheral  Gram Stain (09 Feb 2022 09:00):    Growth in aerobic bottle: Gram Negative Rods    Growth in anaerobic bottle: Gram Negative Rods  Final Report (09 Feb 2022 09:00):    Growth in aerobic and anaerobic bottles: Klebsiella aerogenes    ***Blood Panel PCR results on this specimen are available    approximately 3 hours after the Gram stain result.***    Gram stain, PCR, and/or culture results may not always    correspond due to difference in methodologies.    ************************************************************    This PCR assay was performed by multiplex PCR. This    Assay tests for 66 bacterial and resistance gene targets.    Please refer to the [facility] test directory    at [email or web address] for details.  Organism: Blood Culture PCR  Enterobacter aerogenes (09 Feb 2022 09:00)  Organism: Enterobacter aerogenes (09 Feb 2022 09:00)  Organism: Blood Culture PCR (09 Feb 2022 09:00)    Culture - Blood (collected 07 Feb 2022 00:35)  Source: .Blood Blood-Peripheral  Gram Stain (09 Feb 2022 10:33):    Growth in aerobic and anaerobic bottles: Gram Negative Rods  Final Report (09 Feb 2022 10:33):    Growth in aerobic and anaerobic bottles: Klebsiella aerogenes    See previous culture [id number]

## 2022-02-14 NOTE — PROGRESS NOTE ADULT - ASSESSMENT
HPI: 44 years old female with h/o asthma, NSCLC with known brain metastases, S/P XRT and chemotherapy at [facility] lung cancer S/P left craniotomy and resection of mass in 09/2021 (Dr Parviz Paul) present to ED with slurry speech started at 6AM, last known normal was 4AM  Tachycardic to 120s ( per patient, baseline HR in 120), BP stable, afebrile sat well at RA. WBC 13.36, K 3.5, Cr 1.17, AST/ALT 1207/1231, lipase normal, CK 78. CT head with Interval decompressive left frontotemporal craniectomy with decreased mass effect and resolved rightward midline shift secondary to significant left frontal vasogenic edema. No acute intracranial hemorrhage, extra-axial collection or new suspicious region of vasogenic edema. CTA head/neck with no vaso-occlusive disease. CT perfusion-26 mL of tissue with elevated time to maximum in the left superior medial frontal lobe, likely representing chronic posttreatment changes. Not a candidate for TPA.     Course:     Brain mass lesion w/surrounding edema   present with slurry speech and slight right leg weakness  Not a candidate for TPA  CT head with Interval decompressive left frontotemporal craniectomy with decreased mass effect and resolved rightward midline shift secondary to significant left frontal vasogenic edema. No acute intracranial hemorrhage, extra-axial collection or new suspicious region of vasogenic edema. CTA head/neck with no vaso-occlusive disease. CT perfusion-26 mL of tissue with elevated time to maximum in the left superior medial frontal lobe, likely representing chronic posttreatment changes  Neurology following.   Cont decadrone.   No statin due to elevated LFT  No aspirin due to thrombocytopenia.   MRI brain with and without contrast ;  New postop changes are identified with a large area of abnormal enhancement seen involving the left frontal cortical subcortical region with increased surrounding edema mass effect on left lateral ventricle left-to-right shift. This finding is worrisome for progression of patient's underlying disease process  EEG noted  PT/OT/Speech eval passed; regular diet  2/6 CTH- no bleed    acute metabolic encephalopathy with sepsis with acute cholangitis with klebsiella bacteremia.    Follow HIT, fibrinogen, PT/PTT, serotonin releasing assay  s/p platelet and blood transfusions. H and H stable.   Cont iv ceftriaxone.  Pending labs. will try to get when she is with family.     Elevated LFTs. Transaminitis with CBD stone   AST/ALT 1207/1231  New abnormality. No history of ETOH, drug use, recent change in medications  Acute hepatitis panel   GI consulted- Dr Singh  reviewed ID and IR notes.   I discussed with Dr Oglesby at [facility] about this patient and she has accepted the patient for higher level of care but unfortunately still pending insurance authorization and surgical bed availability.     NSCLC metastatic to brain.   ·  Plan: NSCLC with known brain metastases, S/P XRT and chemotherapy at [facility] lung cancer S/P left craniotomy and resection of mass in 09/2021  On Tagrisso 80mg daily at home.  Hematology/oncology consulted- Dr Sorenson.  Routine EEG with left sided dysfunction, no epileptogenic discharge  Continue with  Decadron 4mg BID for Brain edema  No need for Aspirin at this time    Mild intermittent asthma. Not in exacerbation. albuterol prn.    Hypocalcemia after correction for hypoalbuminemia is 8.3.     Anemia. no active gross bleeding reported. FOBT is negative.     Full Code  Care manager Amparo from [facility] called and updated me that patient has been accepted pending surgical bed availability and insurance auth.

## 2022-02-14 NOTE — PROGRESS NOTE ADULT - SUBJECTIVE AND OBJECTIVE BOX
CHIEF COMPLAINT: Follow up of sepsis with acute cholangitis and bacteremia in an immunocompromised patient with lung cancer  Patient is refusing blood work and some oral meds today.   no fever  not very cooperative .    PHYSICAL EXAM:    GENERAL: Moderately built, no acute distress   CHEST/LUNG: Clear to ausculation bilaterally, no wheezing, no crackles   HEART: RRR, no murmur  ABDOMEN: patient continues to refuse abd exam.   EXTREMITIES:  , No clubbing, cyanosis, or edema  NERVOUS SYSTEM:  Limited exam due to poor participation.   Psychiatry: AA and orientated to her name. other questions patient did not answer but did follow simple commands.       OBJECTIVE DATA:       Vital Signs Last 24 Hrs  T(C): 36.7 (2022 11:55), Max: 36.9 (2022 05:20)  T(F): 98.1 (2022 11:55), Max: 98.5 (2022 05:20)  HR: 92 (2022 11:55) (89 - 92)  BP: 99/74 (2022 11:55) (95/58 - 99/74)  BP(mean): --  RR: 16 (2022 11:55) (16 - 18)  SpO2: 98% (2022 11:55) (98% - 100%)           Daily     Daily Weight in k.5 (2022 05:20)      CAPILLARY BLOOD GLUCOSE      POCT Blood Glucose.: 95 mg/dL (2022 08:48)          MEDICATIONS  (STANDING):  cefTRIAXone   IVPB 2000 milliGRAM(s) IV Intermittent every 24 hours  dexAMETHasone  Injectable 4 milliGRAM(s) IV Push two times a day  dextrose 40% Gel 15 Gram(s) Oral once  dextrose 50% Injectable 25 Gram(s) IV Push once  dextrose 50% Injectable 12.5 Gram(s) IV Push once  dextrose 50% Injectable 25 Gram(s) IV Push once  glucagon  Injectable 1 milliGRAM(s) IntraMuscular once  metoprolol tartrate Injectable 5 milliGRAM(s) IV Push every 6 hours  montelukast  Chewable 5 milliGRAM(s) Oral daily  pantoprazole    Tablet 40 milliGRAM(s) Oral before breakfast  valproate sodium IVPB 250 milliGRAM(s) IV Intermittent every 12 hours    MEDICATIONS  (PRN):  acetaminophen     Tablet .. 650 milliGRAM(s) Oral every 6 hours PRN Moderate Pain (4 - 6)  melatonin 3 milliGRAM(s) Oral at bedtime PRN Insomnia  polyethylene glycol 3350 17 Gram(s) Oral daily PRN Constipation  prochlorperazine   Tablet 10 milliGRAM(s) Oral every 6 hours PRN nausea, vomiting

## 2022-02-14 NOTE — PROGRESS NOTE ADULT - PROBLEM SELECTOR PLAN 1
- still awaiting transfer to Replaced by [facility]  - Decadron  - Abx per ID  - physical therapy

## 2022-02-14 NOTE — PROGRESS NOTE ADULT - ATTENDING COMMENTS
agree with above   change to ceftriaxone   stop meropenem   rest of care per medicine and GI    D/W Dr. Nika Giraldo, DO  Infectious Disease Attending  Reachable via Microsoft Teams or ID office: [phone]  After 5pm/weekends please call [phone] for all inquiries and new consults
agree with above   change to ceftriaxone   stop meropenem   rest of care per medicine and GI    D/W Dr. Nika Giraldo, DO  Infectious Disease Attending  Reachable via Microsoft Teams or ID office: [phone]  After 5pm/weekends please call [phone] for all inquiries and new consults
agree with above  IR consult for perc cholecystomy   polymicrobial bacteremia likely from obstructed CBD stone   remains on steroids for vasogenic edema   continue meropenem  follow all culture sensitivities   continue to attempt to transfer patient to tertiary care center     D/W Dr. Akin Giraldo, DO  Infectious Disease Attending  Reachable via Microsoft Teams or ID office: [phone]  After 5pm/weekends please call [phone] for all inquiries and new consults

## 2022-02-14 NOTE — PROGRESS NOTE ADULT - ASSESSMENT
44 years old female with h/o asthma, NSCLC with known brain metastases, S/P XRT and chemotherapy at [facility] lung cancer S/P left craniotomy and resection of mass in 09/2021 (Dr Parviz Paul) present to ED with slurry speech started at 6AM, last known normal was 4AM.  Reported 1-2 episode of vomiting last night. Per , patient has slight weakness on right leg.   Tachycardic to 120s.   CT head with Interval decompressive left frontotemporal craniectomy with decreased mass effect and resolved rightward midline shift secondary to significant left frontal vasogenic edema. No acute intracranial hemorrhage, extra-axial collection or new suspicious region of vasogenic edema.   CTA head/neck with no vaso-occlusive disease. CT perfusion-26 mL of tissue with elevated time to maximum in the left superior medial frontal lobe, likely representing chronic posttreatment changes. Not a candidate for TPA.   CT (I personally reviewed) possible acute cholangitis   blood cultures positive with enterobacter   high bili, thrombocytopenia, leukocytosis  MRCP small 6mm stone    she is on chemo as well radiation     2/8: afebrile, on RA, WBC high 28.82, BC growing enterobacter (not cloacae) isolated by PCR and 1/4 with Klebsiella aerogenes. Two new sets of BCs with gram negative rods, ID pending, two more sets are pending. The pt is s/p one dose of Amikacin yesterday, on IV Meropenem. Abd exam with diffuse tenderness.    Attending addendum:  agree with above  IR consult for perc cholecystomy   polymicrobial bacteremia likely from obstructed CBD stone   remains on steroids for vasogenic edema   continue meropenem  follow all culture sensitivities   continue to attempt to transfer patient to tertiary care center   2/9: initial BCs with Klebsiella and Enterobacter, Klebsiella sensitivity is pending, repeat BCs with growth, UC #1 with Proteus, UC #2 with Klebsiella, will continue with Meropenem for now without change. No fevers, WBC better 17.51, Cr ok, LFTs better. Pt is frustrated about her wait for the transfer.   2/11: no fevers, no new lab work, continues to refuse exam, repeat BCs with no growth, abx changed to ceftriaxone as per cultures sensitivity, discussed organism isolation with the lab, Enterobacter aerogens and Klebsiella aerogens - interchangable terms   2/14: afebrile, on RA, pt has been refusing all lab work, there is no documentation of negative blood cultures, ceftriaxone continued. The pt is still awaiting for transfer, waiting for bed availability, frustrated.    sepsis due to cholangitis   leukocytosis   thrombocytopenia     Plan:  s/p one dose of amikacin 900mg on 2/7  continue ceftriaxone 2 grams IV q 24 hrs  patient needs ERCP but deemed not urgent   if ERCP is done please send cultures   follow all culture data   if long delay in transfer, IR consult for percutaneous cholecystotomy   repeat blood cultures NGTD  trend platelets and transfuse as needed   maintain active type and screen   trend wbc   avoid any further doses of chemotherapy at this time during an active infection   consider psych consult for possible depression  obtain two sets of BCs - cancelled yesterday as the pt refused, ordered again today    Discussed with Dr. Maher 44 years old female with h/o asthma, NSCLC with known brain metastases, S/P XRT and chemotherapy at [facility] lung cancer S/P left craniotomy and resection of mass in 09/2021 (Dr Parviz Paul) present to ED with slurry speech started at 6AM, last known normal was 4AM.  Reported 1-2 episode of vomiting last night. Per , patient has slight weakness on right leg.   Tachycardic to 120s.   CT head with Interval decompressive left frontotemporal craniectomy with decreased mass effect and resolved rightward midline shift secondary to significant left frontal vasogenic edema. No acute intracranial hemorrhage, extra-axial collection or new suspicious region of vasogenic edema.   CTA head/neck with no vaso-occlusive disease. CT perfusion-26 mL of tissue with elevated time to maximum in the left superior medial frontal lobe, likely representing chronic posttreatment changes. Not a candidate for TPA.   CT (I personally reviewed) possible acute cholangitis   blood cultures positive with enterobacter   high bili, thrombocytopenia, leukocytosis  MRCP small 6mm stone    she is on chemo as well radiation     2/8: afebrile, on RA, WBC high 28.82, BC growing enterobacter (not cloacae) isolated by PCR and 1/4 with Klebsiella aerogenes. Two new sets of BCs with gram negative rods, ID pending, two more sets are pending. The pt is s/p one dose of Amikacin yesterday, on IV Meropenem. Abd exam with diffuse tenderness.    Attending addendum:  agree with above  IR consult for perc cholecystomy   polymicrobial bacteremia likely from obstructed CBD stone   remains on steroids for vasogenic edema   continue meropenem  follow all culture sensitivities   continue to attempt to transfer patient to tertiary care center   2/9: initial BCs with Klebsiella and Enterobacter, Klebsiella sensitivity is pending, repeat BCs with growth, UC #1 with Proteus, UC #2 with Klebsiella, will continue with Meropenem for now without change. No fevers, WBC better 17.51, Cr ok, LFTs better. Pt is frustrated about her wait for the transfer.   2/11: no fevers, no new lab work, continues to refuse exam, repeat BCs with no growth, abx changed to ceftriaxone as per cultures sensitivity, discussed organism isolation with the lab, Enterobacter aerogens and Klebsiella aerogens - interchangable terms   attending addendum:  agree with above   change to ceftriaxone   stop meropenem   rest of care per medicine and GI  2/14: afebrile, on RA, pt has been refusing all lab work, there is no documentation of negative blood cultures, ceftriaxone continued. The pt is still awaiting for transfer, waiting for bed availability, frustrated.    sepsis due to cholangitis   leukocytosis   thrombocytopenia     Plan:  s/p one dose of amikacin 900mg on 2/7  continue ceftriaxone 2 grams IV q 24 hrs  patient needs ERCP but deemed not urgent   if ERCP is done please send cultures   follow all culture data   if long delay in transfer, IR consult for percutaneous cholecystotomy   repeat blood cultures NGTD  trend platelets and transfuse as needed   maintain active type and screen   trend wbc   avoid any further doses of chemotherapy at this time during an active infection   consider psych consult for possible depression  obtain two sets of BCs - cancelled yesterday as the pt refused, ordered again today    Discussed with Dr. Maher

## 2022-02-14 NOTE — PROVIDER CONTACT NOTE (MEDICATION) - SITUATION
AO3 Pt w/st 4 Lung ca, perf gallbladder currently refusing all IV medications. US guided IV placed by PA in order to be able to administer medications. Pt stated " I don't want anything from anyone, leave me alone."

## 2022-02-15 LAB
ALBUMIN SERPL ELPH-MCNC: 2.3 G/DL — LOW (ref 3.3–5)
ALP SERPL-CCNC: 225 U/L — HIGH (ref 40–120)
ALT FLD-CCNC: 58 U/L — SIGNIFICANT CHANGE UP (ref 12–78)
ANION GAP SERPL CALC-SCNC: 8 MMOL/L — SIGNIFICANT CHANGE UP (ref 5–17)
AST SERPL-CCNC: 19 U/L — SIGNIFICANT CHANGE UP (ref 15–37)
BILIRUB SERPL-MCNC: 0.9 MG/DL — SIGNIFICANT CHANGE UP (ref 0.2–1.2)
BUN SERPL-MCNC: 6 MG/DL — LOW (ref 7–23)
CALCIUM SERPL-MCNC: 8.6 MG/DL — SIGNIFICANT CHANGE UP (ref 8.5–10.1)
CHLORIDE SERPL-SCNC: 102 MMOL/L — SIGNIFICANT CHANGE UP (ref 96–108)
CO2 SERPL-SCNC: 24 MMOL/L — SIGNIFICANT CHANGE UP (ref 22–31)
CREAT SERPL-MCNC: 0.63 MG/DL — SIGNIFICANT CHANGE UP (ref 0.5–1.3)
FLUAV AG NPH QL: SIGNIFICANT CHANGE UP
FLUBV AG NPH QL: SIGNIFICANT CHANGE UP
GLUCOSE BLDC GLUCOMTR-MCNC: 140 MG/DL — HIGH (ref 70–99)
GLUCOSE BLDC GLUCOMTR-MCNC: 85 MG/DL — SIGNIFICANT CHANGE UP (ref 70–99)
GLUCOSE SERPL-MCNC: 140 MG/DL — HIGH (ref 70–99)
HCT VFR BLD CALC: 25.9 % — LOW (ref 34.5–45)
HGB BLD-MCNC: 8.5 G/DL — LOW (ref 11.5–15.5)
MCHC RBC-ENTMCNC: 29.1 PG — SIGNIFICANT CHANGE UP (ref 27–34)
MCHC RBC-ENTMCNC: 32.8 G/DL — SIGNIFICANT CHANGE UP (ref 32–36)
MCV RBC AUTO: 88.7 FL — SIGNIFICANT CHANGE UP (ref 80–100)
NRBC # BLD: 0 /100 WBCS — SIGNIFICANT CHANGE UP (ref 0–0)
PLATELET # BLD AUTO: 503 K/UL — HIGH (ref 150–400)
POTASSIUM SERPL-MCNC: 4.1 MMOL/L — SIGNIFICANT CHANGE UP (ref 3.5–5.3)
POTASSIUM SERPL-SCNC: 4.1 MMOL/L — SIGNIFICANT CHANGE UP (ref 3.5–5.3)
PROT SERPL-MCNC: 7.4 GM/DL — SIGNIFICANT CHANGE UP (ref 6–8.3)
RBC # BLD: 2.92 M/UL — LOW (ref 3.8–5.2)
RBC # FLD: 17 % — HIGH (ref 10.3–14.5)
SARS-COV-2 RNA SPEC QL NAA+PROBE: SIGNIFICANT CHANGE UP
SODIUM SERPL-SCNC: 134 MMOL/L — LOW (ref 135–145)
WBC # BLD: 13.06 K/UL — HIGH (ref 3.8–10.5)
WBC # FLD AUTO: 13.06 K/UL — HIGH (ref 3.8–10.5)

## 2022-02-15 PROCEDURE — 90792 PSYCH DIAG EVAL W/MED SRVCS: CPT

## 2022-02-15 PROCEDURE — 99232 SBSQ HOSP IP/OBS MODERATE 35: CPT

## 2022-02-15 PROCEDURE — 99233 SBSQ HOSP IP/OBS HIGH 50: CPT

## 2022-02-15 RX ADMIN — CEFTRIAXONE 100 MILLIGRAM(S): 500 INJECTION, POWDER, FOR SOLUTION INTRAMUSCULAR; INTRAVENOUS at 15:39

## 2022-02-15 RX ADMIN — MONTELUKAST 5 MILLIGRAM(S): 4 TABLET, CHEWABLE ORAL at 15:39

## 2022-02-15 RX ADMIN — Medication 4 MILLIGRAM(S): at 17:19

## 2022-02-15 RX ADMIN — Medication 26.25 MILLIGRAM(S): at 08:18

## 2022-02-15 RX ADMIN — Medication 4 MILLIGRAM(S): at 08:31

## 2022-02-15 RX ADMIN — Medication 26.25 MILLIGRAM(S): at 17:19

## 2022-02-15 NOTE — PROGRESS NOTE ADULT - SUBJECTIVE AND OBJECTIVE BOX
lying in bed    Vital Signs Last 24 Hrs  T(C): 36.4 (15 Feb 2022 10:19), Max: 36.7 (14 Feb 2022 11:55)  T(F): 97.5 (15 Feb 2022 10:19), Max: 98.1 (14 Feb 2022 11:55)  HR: 90 (15 Feb 2022 10:19) (89 - 98)  BP: 96/66 (15 Feb 2022 10:19) (96/66 - 104/67)  BP(mean): --  RR: 19 (15 Feb 2022 10:19) (16 - 19)  SpO2: 100% (15 Feb 2022 10:19) (98% - 100%)    PHYSICAL EXAM:    general - weak looking +  HEENT - No Icterus  CVS - RRR  RS - AE B/L  Abd - soft, NT  Ext - Pulses +          Culture - Blood (collected 09 Feb 2022 00:56)  Source: .Blood Blood-Peripheral  Gram Stain (14 Feb 2022 11:32):    Growth in aerobic bottle: Gram Negative Rods  Final Report (14 Feb 2022 11:32):    Growth in aerobic bottle: Klebsiella aerogenes    See previous culture [id number]    Culture - Blood (collected 08 Feb 2022 18:12)  Source: .Blood Blood-Peripheral  Final Report (13 Feb 2022 19:00):    No Growth Final    Culture - Blood (collected 07 Feb 2022 15:02)  Source: .Blood Blood-Peripheral  Gram Stain (09 Feb 2022 09:27):    Growth in aerobic and anaerobic bottles: Gram Negative Rods  Final Report (09 Feb 2022 09:27):    Growth in aerobic and anaerobic bottles: Klebsiella aerogenes    See previous culture [id number]    Culture - Blood (collected 07 Feb 2022 15:02)  Source: .Blood Blood-Peripheral  Gram Stain (09 Feb 2022 10:15):    Growth in aerobic bottle:    Gram Negative Rods    Growth in anaerobic bottle: Gram Negative Rods  Final Report (09 Feb 2022 10:15):    Growth in aerobic and anaerobic bottles: Klebsiella aerogenes    See previous culture [id number]    Culture - Urine (collected 07 Feb 2022 09:07)  Source: Clean Catch Clean Catch (Midstream)  Final Report (09 Feb 2022 18:57):    10,000 - 49,000 CFU/mL Klebsiella aerogenes  Organism: Enterobacter aerogenes (09 Feb 2022 18:57)  Organism: Enterobacter aerogenes (09 Feb 2022 18:57)    Culture - Blood (collected 07 Feb 2022 00:35)  Source: .Blood Blood-Peripheral  Gram Stain (09 Feb 2022 09:00):    Growth in aerobic bottle: Gram Negative Rods    Growth in anaerobic bottle: Gram Negative Rods  Final Report (09 Feb 2022 09:00):    Growth in aerobic and anaerobic bottles: Klebsiella aerogenes    ***Blood Panel PCR results on this specimen are available    approximately 3 hours after the Gram stain result.***    Gram stain, PCR, and/or culture results may not always    correspond due to difference in methodologies.    ************************************************************    This PCR assay was performed by multiplex PCR. This    Assay tests for 66 bacterial and resistance gene targets.    Please refer to the [facility] test directory    at [email or web address] for details.  Organism: Blood Culture PCR  Enterobacter aerogenes (09 Feb 2022 09:00)  Organism: Enterobacter aerogenes (09 Feb 2022 09:00)  Organism: Blood Culture PCR (09 Feb 2022 09:00)    Culture - Blood (collected 07 Feb 2022 00:35)  Source: .Blood Blood-Peripheral  Gram Stain (09 Feb 2022 10:33):    Growth in aerobic and anaerobic bottles: Gram Negative Rods  Final Report (09 Feb 2022 10:33):    Growth in aerobic and anaerobic bottles: Klebsiella aerogenes    See previous culture [id number]

## 2022-02-15 NOTE — BH CONSULTATION LIAISON ASSESSMENT NOTE - CURRENT MEDICATION
MEDICATIONS  (STANDING):  cefTRIAXone   IVPB 2000 milliGRAM(s) IV Intermittent every 24 hours  dexAMETHasone  Injectable 4 milliGRAM(s) IV Push two times a day  dextrose 40% Gel 15 Gram(s) Oral once  dextrose 50% Injectable 25 Gram(s) IV Push once  dextrose 50% Injectable 12.5 Gram(s) IV Push once  dextrose 50% Injectable 25 Gram(s) IV Push once  glucagon  Injectable 1 milliGRAM(s) IntraMuscular once  metoprolol tartrate Injectable 5 milliGRAM(s) IV Push every 6 hours  montelukast  Chewable 5 milliGRAM(s) Oral daily  pantoprazole    Tablet 40 milliGRAM(s) Oral before breakfast  valproate sodium IVPB 250 milliGRAM(s) IV Intermittent every 12 hours    MEDICATIONS  (PRN):  acetaminophen     Tablet .. 650 milliGRAM(s) Oral every 6 hours PRN Moderate Pain (4 - 6)  melatonin 3 milliGRAM(s) Oral at bedtime PRN Insomnia  polyethylene glycol 3350 17 Gram(s) Oral daily PRN Constipation  prochlorperazine   Tablet 10 milliGRAM(s) Oral every 6 hours PRN nausea, vomiting

## 2022-02-15 NOTE — BH CONSULTATION LIAISON ASSESSMENT NOTE - NSBHCHARTREVIEWVS_PSY_A_CORE FT
Vital Signs Last 24 Hrs  T(C): 36.4 (15 Feb 2022 10:19), Max: 36.7 (14 Feb 2022 17:53)  T(F): 97.5 (15 Feb 2022 10:19), Max: 98.1 (14 Feb 2022 17:57)  HR: 90 (15 Feb 2022 10:19) (89 - 98)  BP: 96/66 (15 Feb 2022 10:19) (96/66 - 104/67)  BP(mean): --  RR: 19 (15 Feb 2022 10:19) (16 - 19)  SpO2: 100% (15 Feb 2022 10:19) (99% - 100%)

## 2022-02-15 NOTE — BH CONSULTATION LIAISON ASSESSMENT NOTE - SUMMARY
Chart, labs and imaging reviewed. When history and Patient's overall presentation is taken into account, it is consistent with a state of having NO capacity to make her medical decisions. AMS like a manifestation of a cumulative effect of extensive enhancement in the left frontal lobe is noted in conjunction with diffuse of vasogenic edema and leukoencephalopathy, sepsis, metastatic illness and IV corticosteroids.

## 2022-02-15 NOTE — PROGRESS NOTE ADULT - ASSESSMENT
HPI: 44 years old female with h/o asthma, NSCLC with known brain metastases, S/P XRT and chemotherapy at [facility] lung cancer S/P left craniotomy and resection of mass in 09/2021 (Dr Parviz Paul) present to ED with slurry speech started at 6AM, last known normal was 4AM  Tachycardic to 120s ( per patient, baseline HR in 120), BP stable, afebrile sat well at RA. WBC 13.36, K 3.5, Cr 1.17, AST/ALT 1207/1231, lipase normal, CK 78. CT head with Interval decompressive left frontotemporal craniectomy with decreased mass effect and resolved rightward midline shift secondary to significant left frontal vasogenic edema. No acute intracranial hemorrhage, extra-axial collection or new suspicious region of vasogenic edema. CTA head/neck with no vaso-occlusive disease. CT perfusion-26 mL of tissue with elevated time to maximum in the left superior medial frontal lobe, likely representing chronic posttreatment changes. Not a candidate for TPA.     Course:     Brain mass lesion w/surrounding edema   present with slurry speech and slight right leg weakness  CT head with Interval decompressive left frontotemporal craniectomy with decreased mass effect and resolved rightward midline shift secondary to significant left frontal vasogenic edema. No acute intracranial hemorrhage, extra-axial collection or new suspicious region of vasogenic edema. CTA head/neck with no vaso-occlusive disease. CT perfusion-26 mL of tissue with elevated time to maximum in the left superior medial frontal lobe, likely representing chronic posttreatment changes  Neurology following.   Cont decadrone.   No statin due to elevated LFT  No aspirin due to thrombocytopenia.   MRI brain with and without contrast ;  New postop changes are identified with a large area of abnormal enhancement seen involving the left frontal cortical subcortical region with increased surrounding edema mass effect on left lateral ventricle left-to-right shift. This finding is worrisome for progression of patient's underlying disease process  EEG noted  PT/OT/Speech eval passed; regular diet  2/6 CTH- no bleed    acute metabolic encephalopathy with sepsis with acute cholangitis with klebsiella bacteremia.    Follow HIT, fibrinogen, PT/PTT, serotonin releasing assay  s/p platelet and blood transfusions. H and H stable.   Cont iv ceftriaxone.  Pending labs. will try to get when she is with family.     Elevated LFTs. Transaminitis with CBD stone   AST/ALT 1207/1231  New abnormality. No history of ETOH, drug use, recent change in medications  Acute hepatitis panel   GI consulted- Dr Singh  reviewed ID and IR notes.   I discussed with Dr Oglesby at [facility] about this patient and she has accepted the patient for higher level of care but unfortunately still pending insurance authorization and surgical bed availability.     NSCLC metastatic to brain.   ·  Plan: NSCLC with known brain metastases, S/P XRT and chemotherapy at [facility] lung cancer S/P left craniotomy and resection of mass in 09/2021  On Tagrisso 80mg daily at home.  Hematology/oncology on board.  Routine EEG with left sided dysfunction, no epileptogenic discharge  Continue with  Decadron 4mg BID for Brain edema  No need for Aspirin at this time    Mild intermittent asthma. Not in exacerbation. albuterol prn.    Hypocalcemia after correction for hypoalbuminemia is 8.3.     Anemia. no active gross bleeding reported. FOBT is negative.     Full Code  Care manager Amparo from [facility] called and updated me that patient has been accepted pending surgical bed availability and insurance auth.

## 2022-02-15 NOTE — PROGRESS NOTE ADULT - SUBJECTIVE AND OBJECTIVE BOX
VENANCIO CHAIREZ  MRN-[id number]    Follow Up:  Cholangitis, bacteremia    Interval History: The pt was seen and examined earlier, no distress, continues to refuse blood work. Pt is afebrile, on RA, allowed for an exam today     PAST MEDICAL & SURGICAL HISTORY:  No pertinent past medical history    Malignant neoplasm of lung, unspecified laterality, unspecified part of lung    Gastroesophageal reflux disease, esophagitis presence not specified    Asthma, unspecified asthma severity, unspecified whether complicated, unspecified whether persistent    H/O craniotomy    Brain mass        ROS:   limited, pt denies any sob, denies abdominal pain, no N/V  [ ] Unobtainable because:  [x ] All other systems negative    Constitutional: no fever, no chills  Head: no trauma  Eyes: no vision changes, no eye pain  ENT:  no sore throat, no rhinorrhea  Cardiovascular:  no chest pain, no palpitation  Respiratory:  no SOB, no cough  GI:  no abd pain, no vomiting, no diarrhea  urinary: no dysuria, no hematuria, no flank pain  musculoskeletal:  no joint pain, no joint swelling  skin:  no rash  neurology:  no headache, no seizure, no change in mental status  psych: no anxiety, no depression         Allergies  codeine (Other)  latex (Rash)        ANTIMICROBIALS:  cefTRIAXone   IVPB 2000 every 24 hours      OTHER MEDS:  acetaminophen     Tablet .. 650 milliGRAM(s) Oral every 6 hours PRN  dexAMETHasone  Injectable 4 milliGRAM(s) IV Push two times a day  dextrose 40% Gel 15 Gram(s) Oral once  dextrose 50% Injectable 25 Gram(s) IV Push once  dextrose 50% Injectable 12.5 Gram(s) IV Push once  dextrose 50% Injectable 25 Gram(s) IV Push once  glucagon  Injectable 1 milliGRAM(s) IntraMuscular once  melatonin 3 milliGRAM(s) Oral at bedtime PRN  metoprolol tartrate Injectable 5 milliGRAM(s) IV Push every 6 hours  montelukast  Chewable 5 milliGRAM(s) Oral daily  pantoprazole    Tablet 40 milliGRAM(s) Oral before breakfast  polyethylene glycol 3350 17 Gram(s) Oral daily PRN  prochlorperazine   Tablet 10 milliGRAM(s) Oral every 6 hours PRN  valproate sodium IVPB 250 milliGRAM(s) IV Intermittent every 12 hours      Vital Signs Last 24 Hrs  T(C): 36.8 (15 Feb 2022 16:24), Max: 36.8 (15 Feb 2022 16:24)  T(F): 98.3 (15 Feb 2022 16:24), Max: 98.3 (15 Feb 2022 16:24)  HR: 85 (15 Feb 2022 16:24) (85 - 98)  BP: 101/70 (15 Feb 2022 16:24) (96/66 - 104/67)  BP(mean): --  RR: 18 (15 Feb 2022 16:24) (16 - 19)  SpO2: 99% (15 Feb 2022 16:24) (99% - 100%)    Physical Exam:  Constitutional: non-toxic, no distress  HEAD/EYES: anicteric, no conjunctival injection, scleral icterus, left scalp deformity with large scar due to prior craniotomy and brain surgery  ENT:  supple, no oral lesions   Cardiovascular:   regular, S1, S2, no murmur   Respiratory: clear breath sounds bilaterally, no wheezes, no rhonchi  GI:  abd soft, not tender, not distended, no guarding, no rebound   Musculoskeletal:  moves all four extremities, no joint swelling   Neurologic: awake and alert, at times does not want to answer questions  Skin: no rash, PIV ok   Psychiatric:  awake, alert,frustrated    WBC Count: 18.35 K/uL (02-12 @ 11:28)  WBC Count: 20.70 K/uL (02-10 @ 14:17)  WBC Count: 17.51 K/uL (02-09 @ 07:33)      Creatinine Trend: 0.35<--, 0.49<--, 0.51<--, 0.60<--, 0.63<--, 0.85<--      MICROBIOLOGY:  v  .Blood Blood-Peripheral  02-09-22   Growth in aerobic bottle: Klebsiella aerogenes  See previous culture [id number]  --    Growth in aerobic bottle: Gram Negative Rods      .Blood Blood-Peripheral  02-08-22   No Growth Final  --  --      .Blood Blood-Peripheral  02-07-22   Growth in aerobic and anaerobic bottles: Klebsiella aerogenes  See previous culture [id number]  --    Growth in aerobic bottle:  Gram Negative Rods  Growth in anaerobic bottle: Gram Negative Rods      Clean Catch Clean Catch (Midstream)  02-07-22   10,000 - 49,000 CFU/mL Klebsiella aerogenes  --  Enterobacter aerogenes      .Blood Blood-Peripheral  02-07-22   Growth in aerobic and anaerobic bottles: Klebsiella aerogenes  See previous culture [id number]  --  Blood Culture PCR  Enterobacter aerogenes      Clean Catch Clean Catch (Midstream)  02-02-22   >100,000 CFU/ml Proteus mirabilis  <10,000 CFU/ml Normal Urogenital manish present  --  Proteus mirabilis    Ferritin, Serum: 930 (02-03)          SARS-CoV-2 Result: NotDetec (02-15-22 @ 09:19)    COVID-19 PCR: NotDetec (03 Sep 2021 22:43)    RADIOLOGY:

## 2022-02-15 NOTE — PROGRESS NOTE ADULT - ASSESSMENT
44 years old female with h/o asthma, NSCLC with known brain metastases, S/P XRT and chemotherapy at [facility] lung cancer S/P left craniotomy and resection of mass in 09/2021 (Dr Parviz Paul) present to ED with slurry speech started at 6AM, last known normal was 4AM.  Reported 1-2 episode of vomiting last night. Per , patient has slight weakness on right leg.   Tachycardic to 120s.   CT head with Interval decompressive left frontotemporal craniectomy with decreased mass effect and resolved rightward midline shift secondary to significant left frontal vasogenic edema. No acute intracranial hemorrhage, extra-axial collection or new suspicious region of vasogenic edema.   CTA head/neck with no vaso-occlusive disease. CT perfusion-26 mL of tissue with elevated time to maximum in the left superior medial frontal lobe, likely representing chronic posttreatment changes. Not a candidate for TPA.   CT (I personally reviewed) possible acute cholangitis   blood cultures positive with enterobacter   high bili, thrombocytopenia, leukocytosis  MRCP small 6mm stone    she is on chemo as well radiation     2/8: afebrile, on RA, WBC high 28.82, BC growing enterobacter (not cloacae) isolated by PCR and 1/4 with Klebsiella aerogenes. Two new sets of BCs with gram negative rods, ID pending, two more sets are pending. The pt is s/p one dose of Amikacin yesterday, on IV Meropenem. Abd exam with diffuse tenderness.    Attending addendum:  agree with above  IR consult for perc cholecystomy   polymicrobial bacteremia likely from obstructed CBD stone   remains on steroids for vasogenic edema   continue meropenem  follow all culture sensitivities   continue to attempt to transfer patient to tertiary care center   2/9: initial BCs with Klebsiella and Enterobacter, Klebsiella sensitivity is pending, repeat BCs with growth, UC #1 with Proteus, UC #2 with Klebsiella, will continue with Meropenem for now without change. No fevers, WBC better 17.51, Cr ok, LFTs better. Pt is frustrated about her wait for the transfer.   2/11: no fevers, no new lab work, continues to refuse exam, repeat BCs with no growth, abx changed to ceftriaxone as per cultures sensitivity, discussed organism isolation with the lab, Enterobacter aerogens and Klebsiella aerogens - interchangable terms   attending addendum:  agree with above   change to ceftriaxone   stop meropenem   rest of care per medicine and GI  2/14: afebrile, on RA, pt has been refusing all lab work, there is no documentation of negative blood cultures, ceftriaxone continued. The pt is still awaiting for transfer, waiting for bed availability, frustrated.  2/15: remains afebrile, still there is no repeat BCs available, pt has been refusing any blood work, will continue with ceftriaxone until pt's procedure, pending hospital course. Pt was seen by psych, the pt has no capacity to make her decisions.     sepsis due to cholangitis   leukocytosis   thrombocytopenia     Plan:  s/p one dose of amikacin 900mg on 2/7  continue ceftriaxone 2 grams IV q 24 hrs  patient needs ERCP but deemed not urgent   if ERCP is done please send cultures   follow all culture data   if long delay in transfer, IR consult for percutaneous cholecystotomy   repeat blood cultures  trend platelets and transfuse as needed   maintain active type and screen   trend wbc   avoid any further doses of chemotherapy at this time during an active infection   psych consult appreciated   obtain two sets of BCs - the pt has been refusing her blood work  address [facility]    Discussed with Dr. Coyle  Discussed with Dr. Maher

## 2022-02-15 NOTE — BH CONSULTATION LIAISON ASSESSMENT NOTE - MSE UNSTRUCTURED FT
lying in bed, constricted affect, looks visibly annoyed when conversation initiated; keeps saying "there is nothing to do" and "I want to be left alone." Refuses to continue conversation. Fleeting eye contact, maintains attention with slight effort.

## 2022-02-15 NOTE — PROGRESS NOTE ADULT - PROBLEM SELECTOR PLAN 1
- d/w patients  at bedside  - awaiting transfer to [facility]  - Decadron  - patient refusing blood draws

## 2022-02-15 NOTE — PROGRESS NOTE ADULT - SUBJECTIVE AND OBJECTIVE BOX
INTERVAL HPI/OVERNIGHT EVENTS: Pt seen and examined at bedside. Pt keeps refusing lab work, and oral meds.    47y  Vital Signs Last 24 Hrs  T(C): 36.8 (15 Feb 2022 16:24), Max: 36.8 (15 Feb 2022 16:24)  T(F): 98.3 (15 Feb 2022 16:24), Max: 98.3 (15 Feb 2022 16:24)  HR: 85 (15 Feb 2022 16:24) (85 - 98)  BP: 101/70 (15 Feb 2022 16:24) (96/66 - 104/67)  BP(mean): --  RR: 18 (15 Feb 2022 16:24) (16 - 19)  SpO2: 99% (15 Feb 2022 16:24) (99% - 100%)  I&O's Summary    14 Feb 2022 07:01  -  15 Feb 2022 07:00  --------------------------------------------------------  IN: 0 mL / OUT: 900 mL / NET: -900 mL      MEDICATIONS  (STANDING):  cefTRIAXone   IVPB 2000 milliGRAM(s) IV Intermittent every 24 hours  dexAMETHasone  Injectable 4 milliGRAM(s) IV Push two times a day  dextrose 40% Gel 15 Gram(s) Oral once  dextrose 50% Injectable 25 Gram(s) IV Push once  dextrose 50% Injectable 12.5 Gram(s) IV Push once  dextrose 50% Injectable 25 Gram(s) IV Push once  glucagon  Injectable 1 milliGRAM(s) IntraMuscular once  metoprolol tartrate Injectable 5 milliGRAM(s) IV Push every 6 hours  montelukast  Chewable 5 milliGRAM(s) Oral daily  pantoprazole    Tablet 40 milliGRAM(s) Oral before breakfast  valproate sodium IVPB 250 milliGRAM(s) IV Intermittent every 12 hours    MEDICATIONS  (PRN):  acetaminophen     Tablet .. 650 milliGRAM(s) Oral every 6 hours PRN Moderate Pain (4 - 6)  melatonin 3 milliGRAM(s) Oral at bedtime PRN Insomnia  polyethylene glycol 3350 17 Gram(s) Oral daily PRN Constipation  prochlorperazine   Tablet 10 milliGRAM(s) Oral every 6 hours PRN nausea, vomiting    LABS:    trop              CAPILLARY BLOOD GLUCOSE      POCT Blood Glucose.: 85 mg/dL (15 Feb 2022 07:56)      RADIOLOGY & ADDITIONAL TESTS:    Imaging Personally Reviewed:  [ ] YES  [ ] NO    Consultant(s) Notes Reviewed:  [x ] YES  [ ] NO    PHYSICAL EXAM:  GENERAL: Moderately built, no acute distress   CHEST/LUNG: Clear to ausculation bilaterally, no wheezing, no crackles   HEART: RRR, no murmur  ABDOMEN: patient continues to refuse abd exam.   EXTREMITIES:  , No clubbing, cyanosis, or edema  NERVOUS SYSTEM:  Limited exam due to poor participation.   Psychiatry: AA and orientated to her name. other questions patient did not answer but did follow simple commands.    A & P:        Care Discussed with Consultants/Other Providers [ x] YES  [ ] NO

## 2022-02-15 NOTE — BH CONSULTATION LIAISON ASSESSMENT NOTE - NSBHCHARTREVIEWINVESTIGATE_PSY_A_CORE FT
New postop changes are identified with a large area of abnormal enhancement seen involving the left frontal cortical subcortical region with increased surrounding edema mass effect on left lateral ventricle left-to-right shift. This finding is worrisome for progression of patient's underlying disease process though the possibility of   treatment-related changes must be considered.

## 2022-02-15 NOTE — BH CONSULTATION LIAISON ASSESSMENT NOTE - NSSUICPROTFACT_PSY_ALL_CORE
Responsibility to children, family, or others/Identifies reasons for living/Supportive social network of family or friends/Fear of death or the actual act of killing self/Buddhist beliefs

## 2022-02-15 NOTE — BH CONSULTATION LIAISON ASSESSMENT NOTE - HPI (INCLUDE ILLNESS QUALITY, SEVERITY, DURATION, TIMING, CONTEXT, MODIFYING FACTORS, ASSOCIATED SIGNS AND SYMPTOMS)
47 yoF, , domiciled with  with Stage 4 NSCLC s/p XRT / chemo at [facility], left craniotomy with mass resection (reportedly benign) in 9/2021 complicated by surgical site / bone infection now awaiting cranioplasty (has helmet - not brought in to hospital), who was admitted to [facility] on 2/2/22 for acute onset slurred speech and right side weakness / drift. CT head at that time noted Interval decompressive left frontotemporal craniectomy with decreased mass effect and resolved rightward midline shift secondary to significant left frontal vasogenic edema. CT perfusion-26 mL of tissue with elevated time to maximum in the left superior medial frontal lobe, likely representing chronic posttreatment changes. MR Head w/wo IV Cont (02.07.22) showing extensive enhancement in the left frontal lobe is noted in conjunction with diffuse of vasogenic edema and leukoencephalopathy. Hospital course also complicated by increased t. bili and LFT's; dilated biliary duct and intrahepatic duct, with 6 mm biliary duct stone noted on MRCP, AMS, and thrombocytopenia.         47 yoF, , domiciled with  with Stage 4 NSCLC s/p XRT / chemo at [facility], left craniotomy with mass resection (reportedly benign) in 9/2021 complicated by surgical site / bone infection now awaiting cranioplasty (has helmet - not brought in to hospital), who was admitted to [facility] on 2/2/22 for acute onset slurred speech and right side weakness / drift. CT head at that time noted Interval decompressive left frontotemporal craniectomy with decreased mass effect and resolved rightward midline shift secondary to significant left frontal vasogenic edema. CT perfusion-26 mL of tissue with elevated time to maximum in the left superior medial frontal lobe, likely representing chronic posttreatment changes. MR Head w/wo IV Cont (02.07.22) showing extensive enhancement in the left frontal lobe is noted in conjunction with diffuse of vasogenic edema and leukoencephalopathy. Hospital course also complicated by increased t. bili and LFT's; dilated biliary duct and intrahepatic duct, with 6 mm biliary duct stone noted on MRCP, AMS, and thrombocytopenia; polymicrobial bacteremia likely from obstructed CBD stone.         47 yoAAF, , domiciled with  with Stage 4 NSCLC s/p XRT / chemo at [facility], left craniotomy with mass resection (reportedly benign) in 9/2021 complicated by surgical site / bone infection now awaiting cranioplasty (has helmet - not brought in to hospital), who was admitted to [facility] on 2/2/22 for acute onset slurred speech and right side weakness / drift. CT head at that time noted Interval decompressive left frontotemporal craniectomy with decreased mass effect and resolved rightward midline shift secondary to significant left frontal vasogenic edema. CT perfusion-26 mL of tissue with elevated time to maximum in the left superior medial frontal lobe, likely representing chronic posttreatment changes. MR Head w/wo IV Cont (02.07.22) showing extensive enhancement in the left frontal lobe is noted in conjunction with diffuse of vasogenic edema and leukoencephalopathy. Hospital course also complicated by increased t. bili and LFT's; dilated biliary duct and intrahepatic duct, with 6 mm biliary duct stone noted on MRCP, AMS, and thrombocytopenia; polymicrobial bacteremia likely from obstructed CBD stone.     EXAM: lying in bed, constricted affect, looks visibly annoyed when conversation initiated. "Why do you guys hate me?" when conversation started....Patient explains this that she feels someone is "always bothering her" (though does not provide exact details as to what such actions entail) and keeps saying "there is nothing to do" and "I want to be left alone." Refuses to continue conversation. Fleeting eye contact, maintains attention with slight effort.

## 2022-02-15 NOTE — BH CONSULTATION LIAISON ASSESSMENT NOTE - RISK ASSESSMENT
Chronic risk factors: severe medical illness with poor prognosis at an early age. Protective factors: young; ; medication and treatment compliant; no formal past psychiatric history; no suicide attempts; no self-injurious behavior; no hx of aggression/violence; no substance use; no legal issues;+ family support; access to health services.

## 2022-02-16 LAB
ANION GAP SERPL CALC-SCNC: 7 MMOL/L — SIGNIFICANT CHANGE UP (ref 5–17)
BUN SERPL-MCNC: 9 MG/DL — SIGNIFICANT CHANGE UP (ref 7–23)
CALCIUM SERPL-MCNC: 9.1 MG/DL — SIGNIFICANT CHANGE UP (ref 8.5–10.1)
CHLORIDE SERPL-SCNC: 102 MMOL/L — SIGNIFICANT CHANGE UP (ref 96–108)
CO2 SERPL-SCNC: 25 MMOL/L — SIGNIFICANT CHANGE UP (ref 22–31)
CREAT SERPL-MCNC: 0.55 MG/DL — SIGNIFICANT CHANGE UP (ref 0.5–1.3)
GLUCOSE BLDC GLUCOMTR-MCNC: 136 MG/DL — HIGH (ref 70–99)
GLUCOSE BLDC GLUCOMTR-MCNC: 138 MG/DL — HIGH (ref 70–99)
GLUCOSE SERPL-MCNC: 123 MG/DL — HIGH (ref 70–99)
HCT VFR BLD CALC: 24.6 % — LOW (ref 34.5–45)
HGB BLD-MCNC: 8.1 G/DL — LOW (ref 11.5–15.5)
MCHC RBC-ENTMCNC: 29.2 PG — SIGNIFICANT CHANGE UP (ref 27–34)
MCHC RBC-ENTMCNC: 32.9 G/DL — SIGNIFICANT CHANGE UP (ref 32–36)
MCV RBC AUTO: 88.8 FL — SIGNIFICANT CHANGE UP (ref 80–100)
NRBC # BLD: 0 /100 WBCS — SIGNIFICANT CHANGE UP (ref 0–0)
PLATELET # BLD AUTO: 570 K/UL — HIGH (ref 150–400)
POTASSIUM SERPL-MCNC: 4.1 MMOL/L — SIGNIFICANT CHANGE UP (ref 3.5–5.3)
POTASSIUM SERPL-SCNC: 4.1 MMOL/L — SIGNIFICANT CHANGE UP (ref 3.5–5.3)
RBC # BLD: 2.77 M/UL — LOW (ref 3.8–5.2)
RBC # FLD: 17.2 % — HIGH (ref 10.3–14.5)
SODIUM SERPL-SCNC: 134 MMOL/L — LOW (ref 135–145)
WBC # BLD: 12.94 K/UL — HIGH (ref 3.8–10.5)
WBC # FLD AUTO: 12.94 K/UL — HIGH (ref 3.8–10.5)

## 2022-02-16 PROCEDURE — 99232 SBSQ HOSP IP/OBS MODERATE 35: CPT

## 2022-02-16 PROCEDURE — 99233 SBSQ HOSP IP/OBS HIGH 50: CPT

## 2022-02-16 RX ADMIN — PANTOPRAZOLE SODIUM 40 MILLIGRAM(S): 20 TABLET, DELAYED RELEASE ORAL at 06:20

## 2022-02-16 RX ADMIN — Medication 650 MILLIGRAM(S): at 15:29

## 2022-02-16 RX ADMIN — Medication 26.25 MILLIGRAM(S): at 07:42

## 2022-02-16 RX ADMIN — MONTELUKAST 5 MILLIGRAM(S): 4 TABLET, CHEWABLE ORAL at 14:00

## 2022-02-16 RX ADMIN — Medication 5 MILLIGRAM(S): at 06:20

## 2022-02-16 RX ADMIN — Medication 5 MILLIGRAM(S): at 17:33

## 2022-02-16 RX ADMIN — Medication 4 MILLIGRAM(S): at 06:20

## 2022-02-16 RX ADMIN — Medication 650 MILLIGRAM(S): at 16:30

## 2022-02-16 RX ADMIN — Medication 26.25 MILLIGRAM(S): at 17:32

## 2022-02-16 RX ADMIN — CEFTRIAXONE 100 MILLIGRAM(S): 500 INJECTION, POWDER, FOR SOLUTION INTRAMUSCULAR; INTRAVENOUS at 14:01

## 2022-02-16 RX ADMIN — Medication 4 MILLIGRAM(S): at 17:33

## 2022-02-16 NOTE — PROGRESS NOTE ADULT - ASSESSMENT
44 years old female with h/o asthma, NSCLC with known brain metastases, S/P XRT and chemotherapy at [facility] lung cancer S/P left craniotomy and resection of mass in 09/2021 (Dr Parviz Paul) present to ED with slurry speech started at 6AM, last known normal was 4AM.  Reported 1-2 episode of vomiting last night. Per , patient has slight weakness on right leg.   Tachycardic to 120s.   CT head with Interval decompressive left frontotemporal craniectomy with decreased mass effect and resolved rightward midline shift secondary to significant left frontal vasogenic edema. No acute intracranial hemorrhage, extra-axial collection or new suspicious region of vasogenic edema.   CTA head/neck with no vaso-occlusive disease. CT perfusion-26 mL of tissue with elevated time to maximum in the left superior medial frontal lobe, likely representing chronic posttreatment changes. Not a candidate for TPA.   CT (I personally reviewed) possible acute cholangitis   blood cultures positive with enterobacter   high bili, thrombocytopenia, leukocytosis  MRCP small 6mm stone    she is on chemo as well radiation     2/8: afebrile, on RA, WBC high 28.82, BC growing enterobacter (not cloacae) isolated by PCR and 1/4 with Klebsiella aerogenes. Two new sets of BCs with gram negative rods, ID pending, two more sets are pending. The pt is s/p one dose of Amikacin yesterday, on IV Meropenem. Abd exam with diffuse tenderness.    Attending addendum:  agree with above  IR consult for perc cholecystomy   polymicrobial bacteremia likely from obstructed CBD stone   remains on steroids for vasogenic edema   continue meropenem  follow all culture sensitivities   continue to attempt to transfer patient to tertiary care center   2/9: initial BCs with Klebsiella and Enterobacter, Klebsiella sensitivity is pending, repeat BCs with growth, UC #1 with Proteus, UC #2 with Klebsiella, will continue with Meropenem for now without change. No fevers, WBC better 17.51, Cr ok, LFTs better. Pt is frustrated about her wait for the transfer.   2/11: no fevers, no new lab work, continues to refuse exam, repeat BCs with no growth, abx changed to ceftriaxone as per cultures sensitivity, discussed organism isolation with the lab, Enterobacter aerogens and Klebsiella aerogens - interchangable terms   attending addendum:  agree with above   change to ceftriaxone   stop meropenem   rest of care per medicine and GI  2/14: afebrile, on RA, pt has been refusing all lab work, there is no documentation of negative blood cultures, ceftriaxone continued. The pt is still awaiting for transfer, waiting for bed availability, frustrated.  2/15: remains afebrile, still there is no repeat BCs available, pt has been refusing any blood work, will continue with ceftriaxone until pt's procedure, pending hospital course. Pt was seen by psych, the pt has no capacity to make her decisions.   2/16: pt continues to refuse BCs collection, refuses exam, remains afebrile, WBC 13.06 yesterday, Cr ok, LFTs ok, transfer still pending     sepsis due to cholangitis   leukocytosis   thrombocytopenia     Plan:  s/p one dose of amikacin 900mg on 2/7  continue ceftriaxone 2 grams IV q 24 hrs  patient needs ERCP but deemed not urgent   if ERCP is done please send cultures   follow all culture data   if long delay in transfer, IR consult for percutaneous cholecystotomy   repeat blood cultures  trend platelets and transfuse as needed   maintain active type and screen   trend wbc   avoid any further doses of chemotherapy at this time during an active infection   psych consult appreciated   obtain two sets of BCs - the pt has been refusing her blood work  address [facility]  palliative care consult appreciated  Speaking Coherently

## 2022-02-16 NOTE — CONSULT NOTE ADULT - SUBJECTIVE AND OBJECTIVE BOX
Reason for Consultation: NSCLC    HPI: Patient is a 47y Female seen on consultatioin for the evaluation and management of NSCLC    46 yo F hx stage 4 NSCLC s/p XRT and chemotherapy at [facility], s/p L craniotomy and resection of mass in , presenting with slurred speech reported by . Last known normal 4 am. Per , she was getting up to use the bathroom throughout the night, checked on her at 4 am, at which time her speech was normal. 6 am, noted to be slurring speech. Of note, went to event last night, noted to have episode of nb/nb emesis. No gait abnormality yesterday, no dizziness, no falls.    PAST MEDICAL & SURGICAL HISTORY:  No pertinent past medical history    Malignant neoplasm of lung, unspecified laterality, unspecified part of lung    Gastroesophageal reflux disease, esophagitis presence not specified    Asthma, unspecified asthma severity, unspecified whether complicated, unspecified whether persistent    H/O craniotomy    Brain mass        MEDICATIONS  (STANDING):  aspirin  chewable 81 milliGRAM(s) Enteral Tube daily  dexAMETHasone  Injectable 4 milliGRAM(s) IV Push two times a day  dextrose 40% Gel 15 Gram(s) Oral once  dextrose 50% Injectable 25 Gram(s) IV Push once  dextrose 50% Injectable 12.5 Gram(s) IV Push once  dextrose 50% Injectable 25 Gram(s) IV Push once  glucagon  Injectable 1 milliGRAM(s) IntraMuscular once  heparin   Injectable 5000 Unit(s) SubCutaneous every 8 hours  montelukast  Chewable 5 milliGRAM(s) Oral daily  osimertinib 80 milliGRAM(s) Oral daily  sodium chloride 0.9%. 1000 milliLiter(s) (125 mL/Hr) IV Continuous <Continuous>  valproic acid 250 milliGRAM(s) Oral two times a day    MEDICATIONS  (PRN):  melatonin 3 milliGRAM(s) Oral at bedtime PRN Insomnia  prochlorperazine   Tablet 10 milliGRAM(s) Oral every 6 hours PRN nausea, vomiting      Allergies    codeine (Other)  latex (Rash)    Intolerances        SOCIAL HISTORY:    Smoking Status: no  Alcohol: no  Marital Status: yes  Occupation: no    FAMILY HISTORY:    Vital Signs Last 24 Hrs  T(C): 36.9 (2022 05:15), Max: 38.9 (2022 15:47)  T(F): 98.4 (2022 05:15), Max: 102 (2022 15:47)  HR: 89 (2022 05:15) (89 - 136)  BP: 106/73 (2022 05:15) (106/72 - 160/90)  BP(mean): --  RR: 18 (2022 05:15) (18 - 22)  SpO2: 98% (2022 05:15) (96% - 98%)    PHYSICAL EXAM:    general - weak looking +  HEENT - No Icterus  CVS - RRR  RS - AE B/L  Abd - soft, NT  Ext - Pulses +        LABS:                        12.1   2.04  )-----------( 202      ( 2022 07:05 )             38.0     02-03    139  |  108  |  16  ----------------------------<  90  4.0   |  23  |  0.85    Ca    9.6      2022 07:05  Phos  3.8     02-03  Mg     2.9     02-03    TPro  7.9  /  Alb  3.1<L>  /  TBili  2.9<H>  /  DBili  2.1<H>  /  AST  293<H>  /  ALT  762<H>  /  AlkPhos  308<H>  02-03    PT/INR - ( 2022 08:00 )   PT: 16.6 sec;   INR: 1.46 ratio         PTT - ( 2022 08:00 )  PTT:32.3 sec  Urinalysis Basic - ( 2022 14:19 )    Color: Yellow / Appearance: Slightly Turbid / S.005 / pH: x  Gluc: x / Ketone: Negative  / Bili: Small / Urobili: 4 mg/dL   Blood: x / Protein: 30 mg/dL / Nitrite: Negative   Leuk Esterase: Trace / RBC: 0-2 /HPF / WBC 3-5   Sq Epi: x / Non Sq Epi: x / Bacteria: Occasional          
Approached pt- attempted to engage in interview and exam - pt refused to speak to me or allow for exam  will attempt to revisit  chart reviewed- case d/w Dr. Sorenson and Dr. Coyle
HPI:  44 years old female with h/o asthma, NSCLC with known brain metastases, S/P XRT and chemotherapy at [facility] lung cancer S/P left craniotomy and resection of mass in 09/2021 (Dr Parviz Paul) present to ED with slurry speech started at 6AM, last known normal was 4AM.  Reported 1-2 episode of vomiting last night. Per , patient has slight weakness on right leg.   Tachycardic to 120s ( per patient, baseline HR in 120), BP stable, afebrile sat well at RA. WBC 13.36, K 3.5, Cr 1.17, AST/ALT 1207/1231, lipase normal, CK 78. CT head with Interval decompressive left frontotemporal craniectomy with decreased mass effect and resolved rightward midline shift secondary to significant left frontal vasogenic edema. No acute intracranial hemorrhage, extra-axial collection or new suspicious region of vasogenic edema. CTA head/neck with no vaso-occlusive disease. CT perfusion-26 mL of tissue with elevated time to maximum in the left superior medial frontal lobe, likely representing chronic posttreatment changes. Not a candidate for TPA.   ----- As Above ----- History obtained from  / MD / chart  The patient had been doing well until early this AM when she was noted to have a change in mentation and weakness. Neurological w/u in progress.   Called to see patient regarding elevated LFTs. New abnormality. No history of ETOH, drug use, recent change in medications, hepatitis or NSAIDs. Tattoo from 20 years ago. On Tagrisso since 2019.   Yesterday, patient had unexplained N/V. ? Abdominal pain.   Prior to this, the  denies melena, hematochezia, hematemesis, nausea, vomiting, abdominal pain, constipation, diarrhea, or change in bowel movements     SH: no toxic habits  FH: no family h/o HTN, DM (02 Feb 2022 11:00)      PAST MEDICAL & SURGICAL HISTORY:  No pertinent past medical history    Malignant neoplasm of lung, unspecified laterality, unspecified part of lung    Gastroesophageal reflux disease, esophagitis presence not specified    Asthma, unspecified asthma severity, unspecified whether complicated, unspecified whether persistent    H/O craniotomy    Brain mass        MEDICATIONS  (STANDING):  aspirin  chewable 81 milliGRAM(s) Enteral Tube daily  dexAMETHasone  IVPB 10 milliGRAM(s) IV Intermittent once  dextrose 40% Gel 15 Gram(s) Oral once  dextrose 50% Injectable 25 Gram(s) IV Push once  dextrose 50% Injectable 12.5 Gram(s) IV Push once  dextrose 50% Injectable 25 Gram(s) IV Push once  glucagon  Injectable 1 milliGRAM(s) IntraMuscular once  heparin   Injectable 5000 Unit(s) SubCutaneous every 8 hours  montelukast  Chewable 5 milliGRAM(s) Oral daily  osimertinib 80 milliGRAM(s) Oral daily  sodium chloride 0.9%. 1000 milliLiter(s) (125 mL/Hr) IV Continuous <Continuous>  valproic acid 250 milliGRAM(s) Oral two times a day    MEDICATIONS  (PRN):  melatonin 3 milliGRAM(s) Oral at bedtime PRN Insomnia  prochlorperazine   Tablet 10 milliGRAM(s) Oral every 6 hours PRN nausea, vomiting      Allergies    codeine (Other)  latex (Rash)    Intolerances        FAMILY HISTORY:      REVIEW OF SYSTEMS:    CONSTITUTIONAL: No fever, weight loss,   EYES: No eye pain, visual disturbances, or discharge  ENMT:  No difficulty hearing, tinnitus, vertigo; No sinus or throat pain  NECK: No pain or stiffness  BREASTS: No pain, masses, or nipple discharge  RESPIRATORY: No cough, wheezing, chills or hemoptysis; No shortness of breath  CARDIOVASCULAR: No chest pain, palpitations, dizziness, or leg swelling  GASTROINTESTINAL: See above   GENITOURINARY: No dysuria, frequency, hematuria, or incontinence  NEUROLOGICAL: See above   SKIN: No itching, burning, rashes, or lesions   LYMPH NODES: No enlarged glands  ENDOCRINE: No heat or cold intolerance; No hair loss  MUSCULOSKELETAL: No joint pain or swelling; No muscle, back, or extremity pain  PSYCHIATRIC: No depression, anxiety, mood swings, or difficulty sleeping  HEME/LYMPH: No easy bruising, or bleeding gums  ALLERGY AND IMMUNOLOGIC: No hives or eczema          SOCIAL HISTORY:    FAMILY HISTORY:      Vital Signs Last 24 Hrs  T(C): 37.4 (02 Feb 2022 07:34), Max: 37.4 (02 Feb 2022 07:34)  T(F): 99.3 (02 Feb 2022 07:34), Max: 99.3 (02 Feb 2022 07:34)  HR: 120 (02 Feb 2022 10:56) (120 - 127)  BP: 160/90 (02 Feb 2022 10:56) (105/73 - 160/90)  BP(mean): --  RR: 22 (02 Feb 2022 10:56) (19 - 22)  SpO2: 96% (02 Feb 2022 10:56) (96% - 100%)    PHYSICAL EXAM:    GENERAL: NAD, well-groomed, well-developed  HEAD:  Atraumatic, Normocephalic  EYES: EOMI, PERRLA, conjunctiva and sclera clear  NECK: Supple, No JVD, Normal thyroid  NERVOUS SYSTEM:  See neurological note  CHEST/LUNG: Clear to percussion bilaterally; No rales, rhonchi, wheezing, or rubs  HEART: Regular rate and rhythm; No murmurs, rubs, or gallops  ABDOMEN: Soft, Nontender, Nondistended; Bowel sounds present  EXTREMITIES:  2+ Peripheral Pulses, No clubbing, cyanosis, or edema  LYMPH: No lymphadenopathy noted   RECTAL: Deferred   SKIN: No rashes or lesions    LABS:                        12.1   13.36 )-----------( 290      ( 02 Feb 2022 08:00 )             36.2       CBC:  02-02 @ 08:00  WBC  13.36  HGB 12.1  HCT 36.2 Plate 290  MCV 89.8           02 Feb 2022 08:00    138    |  103    |  13     ----------------------------<  130    3.5     |  26     |  1.17     Ca    10.0       02 Feb 2022 08:00    TPro  8.2    /  Alb  3.6    /  TBili  2.4    /  DBili  x      /  AST  1207   /  ALT  1231   /  AlkPhos  333    02 Feb 2022 08:00    PT/INR - ( 02 Feb 2022 08:00 )   PT: 16.6 sec;   INR: 1.46 ratio         PTT - ( 02 Feb 2022 08:00 )  PTT:32.3 sec        RADIOLOGY & ADDITIONAL STUDIES:
Neurology consult    VENANCIO [id number]     Patient is a 47y old  Female who presents with a chief complaint of     HPI: 46 yo F hx stage 4 NSCLC s/p XRT and chemotherapy at [facility], s/p L craniotomy and resection of mass in 2021, presenting with slurred speech reported by . Last known normal 4 am. Per , she was getting up to use the bathroom throughout the night, checked on her at 4 am, at which time her speech was normal. 6 am, noted to be slurring speech. Of note, went to event last night, noted to have episode of nb/nb emesis. No gait abnormality yesterday, no dizziness, no falls. Not on AC.    poor historian    REVIEW OF SYSTEMS:    see HPI    MEDICATIONS        PMH: No pertinent past medical history    Malignant neoplasm of lung, unspecified laterality, unspecified part of lung    Gastroesophageal reflux disease, esophagitis presence not specified    Asthma, unspecified asthma severity, unspecified whether complicated, unspecified whether persistent         PSH: No significant past surgical history    No significant past surgical history    H/O craniotomy    Brain mass      FAMILY HISTORY:      SOCIAL HISTORY:  No history of tobacco or alcohol use     Allergies    codeine (Other)  latex (Rash)    Intolerances        Height (cm): 162.6 (02-02 @ 07:34)  Weight (kg): 64.9 (02-02 @ 07:43)  BMI (kg/m2): 24.5 (02-02 @ 07:43)    Vital Signs Last 24 Hrs  T(C): 37.4 (02 Feb 2022 07:34), Max: 37.4 (02 Feb 2022 07:34)  T(F): 99.3 (02 Feb 2022 07:34), Max: 99.3 (02 Feb 2022 07:34)  HR: 127 (02 Feb 2022 07:34) (127 - 127)  BP: 105/73 (02 Feb 2022 07:34) (105/73 - 105/73)  BP(mean): --  RR: 19 (02 Feb 2022 07:34) (19 - 19)  SpO2: 100% (02 Feb 2022 07:34) (100% - 100%)      On Neurological Examination:    Mental Status - Patient is alert, awake, oriented to self only one word answers. significant psycho-motor slowing smiles to command does not close fist state age or months    Cranial Nerves - PERRL, EOMI, VFF, V1-V3 intact, no gross facial asymmetry, tongue/uvula midline    Motor Exam -   Right upper drift  Left upper no drift   lower poor effort b/l       nml bulk/tone    Sensory    Intact to light touch and pinprick bilaterally    Coord: no obvious dysmetria     Gait -  deferred                        NIHSS 10             LABS:  CBC Full  -  ( 02 Feb 2022 08:00 )  WBC Count : 13.36 K/uL  RBC Count : 4.03 M/uL  Hemoglobin : 12.1 g/dL  Hematocrit : 36.2 %  Platelet Count - Automated : 290 K/uL  Mean Cell Volume : 89.8 fl  Mean Cell Hemoglobin : 30.0 pg  Mean Cell Hemoglobin Concentration : 33.4 g/dL  Auto Neutrophil # : x  Auto Lymphocyte # : x  Auto Monocyte # : x  Auto Eosinophil # : x  Auto Basophil # : x  Auto Neutrophil % : x  Auto Lymphocyte % : x  Auto Monocyte % : x  Auto Eosinophil % : x  Auto Basophil % : x      02-02    138  |  103  |  13  ----------------------------<  130<H>  3.5   |  26  |  1.17    Ca    10.0      02 Feb 2022 08:00    TPro  8.2  /  Alb  3.6  /  TBili  2.4<H>  /  DBili  x   /  AST  1207<H>  /  ALT  1231<H>  /  AlkPhos  333<H>  02-02    LIVER FUNCTIONS - ( 02 Feb 2022 08:00 )  Alb: 3.6 g/dL / Pro: 8.2 gm/dL / ALK PHOS: 333 U/L / ALT: 1231 U/L / AST: 1207 U/L / GGT: x           Hemoglobin A1C:       PT/INR - ( 02 Feb 2022 08:00 )   PT: 16.6 sec;   INR: 1.46 ratio         PTT - ( 02 Feb 2022 08:00 )  PTT:32.3 sec      RADIOLOGY  < from: CT Perfusion w/ Maps w/ IV Cont (02.02.22 @ 08:03) >  IMPRESSION:  CT angiogram neck:  -No vaso-occlusive disease.    CT angiogram head:  -No vaso-occlusive disease.    CT perfusion:  -26 mL of tissue with elevated time to maximum in the left superior   medial frontal lobe, likely representing chronic posttreatment changes.    Consider MRI if clinical concern for acute infarct or new metastases   persists.        < end of copied text >  < from: CT Brain Stroke Protocol (02.02.22 @ 07:51) >  IMPRESSION:  Interval decompressive left frontotemporal craniectomy with decreased   mass effect and resolved rightward midline shift secondary to significant   left frontal vasogenic edema that is discussed above. No acute   intracranial hemorrhage, extra-axial collection or new suspicious region   of vasogenic edema. Additional findings as above.    < end of copied text >                  
VENANCIO CHAIREZ  MRN-[id number]        Patient is a 47y old  Female who presents with a chief complaint of CVA (2022 15:38)      HPI:  44 years old female with h/o asthma, NSCLC with known brain metastases, S/P XRT and chemotherapy at [facility] lung cancer S/P left craniotomy and resection of mass in 2021 (Dr Parviz Paul) present to ED with slurry speech started at 6AM, last known normal was 4AM.  Reported 1-2 episode of vomiting last night. Per , patient has slight weakness on right leg.   Tachycardic to 120s ( per patient, baseline HR in 120), BP stable, afebrile sat well at RA. WBC 13.36, K 3.5, Cr 1.17, AST/ALT 1207/1231, lipase normal, CK 78. CT head with Interval decompressive left frontotemporal craniectomy with decreased mass effect and resolved rightward midline shift secondary to significant left frontal vasogenic edema. No acute intracranial hemorrhage, extra-axial collection or new suspicious region of vasogenic edema. CTA head/neck with no vaso-occlusive disease. CT perfusion-26 mL of tissue with elevated time to maximum in the left superior medial frontal lobe, likely representing chronic posttreatment changes. Not a candidate for TPA.     SH: no toxic habits  FH: no family h/o HTN, DM (2022 11:00)    above reviewed and agree with above   ID consulted for workup and antibiotic management     PAST MEDICAL & SURGICAL HISTORY:  No pertinent past medical history    Malignant neoplasm of lung, unspecified laterality, unspecified part of lung    Gastroesophageal reflux disease, esophagitis presence not specified    Asthma, unspecified asthma severity, unspecified whether complicated, unspecified whether persistent    H/O craniotomy    Brain mass        Allergies  codeine (Other)  latex (Rash)        ANTIMICROBIALS:  piperacillin/tazobactam IVPB.. 3.375 every 8 hours      MEDICATIONS  (STANDING):  amiKACIN  IVPB   253.6 mL/Hr IV Intermittent (22 @ 15:24)    piperacillin/tazobactam IVPB.   200 mL/Hr IV Intermittent (22 @ 15:22)    piperacillin/tazobactam IVPB..   25 mL/Hr IV Intermittent (22 @ 21:17)   25 mL/Hr IV Intermittent (22 @ 15:35)   25 mL/Hr IV Intermittent (22 @ 06:18)   25 mL/Hr IV Intermittent (22 @ 22:35)        OTHER MEDS: MEDICATIONS  (STANDING):  dexAMETHasone  Injectable 4 two times a day  dextrose 40% Gel 15 once  dextrose 50% Injectable 25 once  dextrose 50% Injectable 12.5 once  dextrose 50% Injectable 25 once  glucagon  Injectable 1 once  melatonin 3 at bedtime PRN  metoprolol tartrate Injectable 5 every 6 hours  montelukast  Chewable 5 daily  prochlorperazine   Tablet 10 every 6 hours PRN  valproic acid 250 two times a day      SOCIAL HISTORY:     unknown   FAMILY HISTORY:  unknown     REVIEW OF SYSTEMS  [X  ] ROS unobtainable because:  AMS  [  ] All other systems negative except as noted below:	    Constitutional:  [ ] fever [ ] chills  [ ] weight loss  [ ] weakness  Skin:  [ ] rash [ ] phlebitis	  Eyes: [ ] icterus [ ] pain  [ ] discharge	  ENMT: [ ] sore throat  [ ] thrush [ ] ulcers [ ] exudates  Respiratory: [ ] dyspnea [ ] hemoptysis [ ] cough [ ] sputum	  Cardiovascular:  [ ] chest pain [ ] palpitations [ ] edema	  Gastrointestinal:  [ ] nausea [ ] vomiting [ ] diarrhea [ ] constipation [ ] pain	  Genitourinary:  [ ] dysuria [ ] frequency [ ] hematuria [ ] discharge [ ] flank pain  [ ] incontinence  Musculoskeletal:  [ ] myalgias [ ] arthralgias [ ] arthritis  [ ] back pain  Neurological:  [ ] headache [ ] seizures  [ ] confusion/altered mental status  Psychiatric:  [ ] anxiety [ ] depression	  Hematology/Lymphatics:  [ ] lymphadenopathy  Endocrine:  [ ] adrenal [ ] thyroid  Allergic/Immunologic:	 [ ] transplant [ ] seasonal    Vital Signs Last 24 Hrs  T(F): 97.7 (22 @ 15:15), Max: 102 (22 @ 15:47)    Vital Signs Last 24 Hrs  HR: 73 (22 @ 17:00) (73 - 107)  BP: 134/87 (22 @ 17:00) (105/72 - 134/87)  RR: 18 (22 @ 17:00)  SpO2: 99% (22 @ 17:00) (98% - 100%)  Wt(kg): --    PHYSICAL EXAM:  Constitutional: non-toxic, no distress  HEAD/EYES: anicteric, no conjunctival injection, scleral icterus   ENT:  supple, no thrush  Cardiovascular:   normal S1, S2, no murmur, no edema  Respiratory:  clear BS bilaterally, no wheezes, no rales  GI:  soft, diffusely tender though worse in the RUQ, normal bowel sounds  :  no santiago, no CVA tenderness  Musculoskeletal:  no synovitis, normal ROM  Neurologic: awake and alert but lethargic, at times does not want to answer questions  Skin:  no rash, no erythema, no phlebitis  Heme/Onc: cervical no lymphadenopathy   Psychiatric:  awake, alert, appropriate mood          WBC Count: 27.06 K/uL ( @ 19:19)  WBC Count: 30.10 K/uL ( @ 08:37)  WBC Count: 27.59 K/uL ( @ 22:00)  WBC Count: 25.27 K/uL ( @ 12:37)  WBC Count: 24.12 K/uL ( @ 10:52)  WBC Count: 6.05 K/uL ( @ 10:14)  WBC Count: 2.04 K/uL ( @ 07:05)    Plt Trend: 64<--, 5<--, 48<--, 19<--, 17<--                            9.3    27.06 )-----------( 64       ( 2022 19:19 )             27.2           145  |  116<H>  |  30<H>  ----------------------------<  142<H>  3.4<L>   |  22  |  0.63    Ca    8.2<L>      2022 08:37    TPro  6.1  /  Alb  1.8<L>  /  TBili  4.0<H>  /  DBili  x   /  AST  33  /  ALT  118<H>  /  AlkPhos  299<H>        Creatinine Trend: 0.63<--, 0.85<--, 0.84<--, 0.76<--, 0.85<--, 1.17<--    Urinalysis Basic - ( 2022 01:35 )    Color: Yellow / Appearance: Clear / S.010 / pH: x  Gluc: x / Ketone: Trace  / Bili: Large / Urobili: 4 mg/dL   Blood: x / Protein: 100 mg/dL / Nitrite: Negative   Leuk Esterase: Small / RBC: 6-10 /HPF / WBC 6-10   Sq Epi: x / Non Sq Epi: Few / Bacteria: Moderate        MICROBIOLOGY:    Culture - Blood (collected 22 @ 00:35)  Source: .Blood Blood-Peripheral  Gram Stain (prelim) (22 @ 13:59):    Growth in aerobic bottle: Gram Negative Rods    Growth in anaerobic bottle: Gram Negative Rods  Preliminary Report (22 @ 13:59):    Growth in aerobic bottle: Gram Negative Rods    Growth in anaerobic bottle: Gram Negative Rods    ***Blood Panel PCR results on this specimen are available    approximately 3 hours after the Gram stain result.***    Gram stain, PCR, and/or culture results may not always    correspond due to difference in methodologies.    ************************************************************    This PCR assay was performed by multiplex PCR. This    Assay tests for 66 bacterial and resistance gene targets.    Please refer to the [facility] test directory    at [email or web address] for details.  Organism: Blood Culture PCR (22 @ 16:36)  Organism: Blood Culture PCR (22 @ 16:36)      -  Enterobacter (non-cloacae complex): Detec      Method Type: PCR    Culture - Blood (collected 22 @ 00:35)  Source: .Blood Blood-Peripheral  Gram Stain (prelim) (22 @ 11:03):    Growth in aerobic and anaerobic bottles: Gram Negative Rods  Preliminary Report (22 @ 11:03):    Growth in aerobic and anaerobic bottles: Gram Negative Rods      COVID-19 PCR: NotDetec (03 Sep 2021 22:43)        Ferritin, Serum: 930 ()      SARS-CoV-2 Result: NotDetec (22 @ 14:30)      RADIOLOGY:  CT Head No Cont (22 @ 18:38)   IMPRESSION: No change since 2022. Left frontal encephalomalacia and   gliosis subjacent to left frontal craniectomy defect.    < from: MR MRCP No Cont (22 @ 11:30) >  IMPRESSION:  6 mm stone in the very distal common bile duct. Associated common bile   duct dilatation measuring 8 mm in caliber. Intrahepatic biliary ductal   dilatation is noted as well. The cystic duct is dilated. The gallbladder   is underdistended.    Small pericardial effusion.    CT Abdomen and Pelvis w/ IV Cont (22 @ 18:53) >  IMPRESSION:  Small pericardial effusion    Intra and extrahepatic biliary ductal dilatation without radiopaque   calculus. No evidence of pancreatic ductal dilatation. Differential   diagnosis also includes cholangitis. Mass is felt less likely without   pancreatic ductal dilatation. Recommend additional imaging with MRI to   exclude noncalcified stone.        < from: MR Head w/wo IV Cont (22 @ 11:28) >    IMPRESSION: New postop changes are identified with a large area of   abnormal enhancement seen involving the left frontal cortical subcortical   region with increased surrounding edema mass effect on left lateral   ventricle left-to-right shift. This finding is worrisome for progression   of patient's underlying disease process though the possibility of   treatment-related changes must be considered. Clinical correlation and   continued close interval is recommended. MRI perfusion can also be done   for further evaluation.    < end of copied text >            
Mrs. Faith is a 47 year old female with a PMH of stage 4 NSCLC s/p XRT / chemo at [facility], and s/p left craniotomy with mass resection (reportedly benign) in 2021 also at [facility] with Dr. Parviz Paul complicated by surgical site / bone infection now pt awaiting cranioplasty (has helmet - not brought in to hospital), who was admitted to [facility] on  for acute onset slurred speech and right side weakness / drift. CT head at that time noted Interval decompressive left frontotemporal craniectomy with decreased mass effect and resolved rightward midline shift secondary to significant left frontal vasogenic edema. No acute intracranial hemorrhage, extra-axial collection or new suspicious region of vasogenic edema. CTA head/neck with no vaso-occlusive disease. CT perfusion-26 mL of tissue with elevated time to maximum in the left superior medial frontal lobe, likely representing chronic posttreatment changes.    Pt has been followed by neurology and well as heme/onc services during her hospital stay and MRI performed (results below). EEG was negative for seizure activity, however, left sided dysfunction has been noted. Neurology was recommending fup MRI on outpatient basis. Pt is on decadron for tx of the edema.     Hospital course also complicated by increased t. bili and LFT's; dilated biliary duct and intrahepatic duct, with 6 mm biliary duct stone noted on MRCP, however, the galbladder is not distended.     Earlier today pt noted with increased lethargy / AMS, and thrombocytopenia. ICU team consulted.        Allergies  codeine (Other)  latex (Rash)      MEDICATIONS  (STANDING):  dexAMETHasone  Injectable 4 milliGRAM(s) IV Push two times a day  dextrose 40% Gel 15 Gram(s) Oral once  dextrose 50% Injectable 25 Gram(s) IV Push once  dextrose 50% Injectable 25 Gram(s) IV Push once  dextrose 50% Injectable 12.5 Gram(s) IV Push once  glucagon  Injectable 1 milliGRAM(s) IntraMuscular once  metoprolol tartrate Injectable 5 milliGRAM(s) IV Push every 6 hours  montelukast  Chewable 5 milliGRAM(s) Oral daily  sodium chloride 0.9%. 1000 milliLiter(s) (100 mL/Hr) IV Continuous <Continuous>  valproic acid 250 milliGRAM(s) Oral two times a day      MEDICATIONS  (PRN):  melatonin 3 milliGRAM(s) Oral at bedtime PRN Insomnia  prochlorperazine   Tablet 10 milliGRAM(s) Oral every 6 hours PRN nausea, vomiting      Daily     Daily Weight in k.8 (2022 05:18)      Drug Dosing Weight  Height (cm): 175.3 (2022 16:27)  Weight (kg): 63.5 (2022 16:27)  BMI (kg/m2): 20.7 (2022 16:27)  BSA (m2): 1.78 (2022 16:)      PAST MEDICAL & SURGICAL HISTORY:  No pertinent past medical history  Malignant neoplasm of lung, unspecified laterality, unspecified part of lung  Gastroesophageal reflux disease, esophagitis presence not specified  Asthma, unspecified asthma severity, unspecified whether complicated, unspecified whether persistent  H/O craniotomy  Brain mass      ADVANCE DIRECTIVES: Full code      Vital Signs Last 24 Hrs  T(C): 37.1 (2022 05:18), Max: 37.6 (2022 02:03)  T(F): 98.7 (2022 05:18), Max: 99.6 (2022 02:03)  HR: 128 (2022 10:57) (110 - 132)  BP: 114/74 (2022 10:57) (112/65 - 125/62)  BP(mean): 87 (2022 10:57) (87 - 88)  RR: 18 (2022 10:57) (18 - 20)  SpO2: 99% (2022 10:57) (99% - 100%)        PHYSICAL EXAM:  GENERAL: lethargic, opens eyes to verbal stimul; was just having discussion with daughter and drank entire ensure; protecting airway  HEAD: left crani incision  EYES: EOMI, PERRL  NECK: Supple  NERVOUS SYSTEM:  Alert & Oriented X3 / lethargic at times; APPIAH x4 (right side slower)  CHEST/LUNG: Clear to percussion bilaterally; No rales, rhonchi, wheezing, or rubs  HEART: Regular rate and rhythm; s1/s2 noted  ABDOMEN: Soft, Nontender, Nondistended; Bowel sounds present  EXTREMITIES:  2+ Peripheral Pulses, No clubbing, cyanosis, or edema  LYMPH: No lymphadenopathy noted  SKIN: No rashes or lesions        LABS:  CBC Full  -  ( 2022 12:37 )  WBC Count : 25.27 K/uL  RBC Count : 3.76 M/uL  Hemoglobin : 11.0 g/dL  Hematocrit : 32.1 %  Platelet Count - Automated : 19 K/uL  Mean Cell Volume : 85.4 fl  Mean Cell Hemoglobin : 29.3 pg  Mean Cell Hemoglobin Concentration : 34.3 g/dL          139  |  111<H>  |  23  ----------------------------<  145<H>  4.0   |  18<L>  |  0.85    Ca    8.3<L>      2022 07:25    TPro  6.5  /  Alb  1.9<L>  /  TBili  7.0<H>  /  DBili  x   /  AST  43<H>  /  ALT  185<H>  /  AlkPhos  439<H>  -    CAPILLARY BLOOD GLUCOSE  POCT Blood Glucose.: 111 mg/dL (2022 06:36)      PT/INR - ( 2022 15:10 )   PT: 21.8 sec;   INR: 1.94 ratio    PTT - ( 2022 15:10 )  PTT:36.8 sec      RADIOLOGY:  MR Head w/wo IV Cont (22 @ 11:28) >  IMPRESSION: New postop changes are identified with a large area of   abnormal enhancement seen involving the left frontal cortical subcortical   region with increased surrounding edema mass effect on left lateral   ventricle left-to-right shift. This finding is worrisome for progression   of patient's underlying disease process though the possibility of   treatment-related changes must be considered. Clinical correlation and   continued close interval is recommended. MRI perfusion can also be done   for further evaluation.  Previously noted lesion involving posterior fossa and supratentorial   region are less conspicuous which is compatible with response to therapy.      MR MRCP No Cont (22 @ 11:30)   IMPRESSION:  6 mm stone in the very distal commonbile duct. Associated common bile   duct dilatation measuring 8 mm in caliber. Intrahepatic biliary ductal   dilatation is noted as well. The cystic duct is dilated. The gallbladder   is underdistended.  Small pericardial effusion.      EEG IMPRESSION/CLINICAL CORRELATE  Abnormal EEG study.  Left hemispheric structural abnormality  No epileptiform pattern or seizure seen.  Skull defect max in the left frontocentral and central temporal regions        CRITICAL CARE TIME SPENT: 45 minutes  
  Chief Complaint:  Patient is a 47y old  Female who presents with a chief complaint of CVA (2022 16:23)      HPI:  44 years old female with h/o asthma, NSCLC with known brain metastases, S/P XRT and chemotherapy at [facility] lung cancer S/P left craniotomy and resection of mass in 2021 (Dr Parviz Paul) present to ED with slurry speech started at 6AM, last known normal was 4AM.  Reported 1-2 episode of vomiting last night. Per , patient has slight weakness on right leg.   Tachycardic to 120s ( per patient, baseline HR in 120), BP stable, afebrile sat well at RA. WBC 13.36, K 3.5, Cr 1.17, AST/ALT 1207/1231, lipase normal, CK 78. CT head with Interval decompressive left frontotemporal craniectomy with decreased mass effect and resolved rightward midline shift secondary to significant left frontal vasogenic edema. No acute intracranial hemorrhage, extra-axial collection or new suspicious region of vasogenic edema. CTA head/neck with no vaso-occlusive disease. CT perfusion-26 mL of tissue with elevated time to maximum in the left superior medial frontal lobe, likely representing chronic posttreatment changes. Not a candidate for TPA. We were asked to evaluate patient for abnormal MRCP + CBD STONE    SH: no toxic habits  FH: no family h/o HTN, DM (2022 11:00)      PMH/PSH:PAST MEDICAL & SURGICAL HISTORY:  No pertinent past medical history  _  Malignant neoplasm of lung, unspecified laterality, unspecified part of lung    Gastroesophageal reflux disease, esophagitis presence not specified    Asthma, unspecified asthma severity, unspecified whether complicated, unspecified whether persistent    H/O craniotomy    Brain mass        Allergies:  codeine (Other)  latex (Rash)      Medications:  aspirin  chewable 81 milliGRAM(s) Enteral Tube daily  dexAMETHasone  Injectable 4 milliGRAM(s) IV Push two times a day  dextrose 40% Gel 15 Gram(s) Oral once  dextrose 50% Injectable 25 Gram(s) IV Push once  dextrose 50% Injectable 12.5 Gram(s) IV Push once  dextrose 50% Injectable 25 Gram(s) IV Push once  glucagon  Injectable 1 milliGRAM(s) IntraMuscular once  heparin   Injectable 5000 Unit(s) SubCutaneous every 8 hours  melatonin 3 milliGRAM(s) Oral at bedtime PRN  metoprolol tartrate Injectable 5 milliGRAM(s) IV Push every 6 hours  montelukast  Chewable 5 milliGRAM(s) Oral daily  prochlorperazine   Tablet 10 milliGRAM(s) Oral every 6 hours PRN  sodium chloride 0.9%. 1000 milliLiter(s) IV Continuous <Continuous>  valproic acid 250 milliGRAM(s) Oral two times a day      Review of Systems:    General:  No weight loss, fevers, chills, night sweats, fatigue,   Eyes:  Good vision, no reported pain  ENT:  No sore throat, pain, runny nose, dysphagia  CV:  No pain, palpitations, hypo/hypertension  Resp:  No dyspnea, cough, tachypnea, wheezing  GI:  No pain, No nausea, No vomiting, No diarrhea, No constipation, No pruritis, No rectal bleeding, No tarry stools, No dysphagia,  :  No pain, bleeding, incontinence, nocturia  Muscle:  No pain, weakness  Breast:  No pain, abscess, mass, discharge  Neuro:  No weakness, tingling, memory problems  Psych:  No fatigue, insomnia, mood problems, depression  Endocrine:  No polyuria, polydipsia, cold/heat intolerance  Heme:  No petechiae, ecchymosis, easy bruisability  Skin:  No rash, tattoos, scars, edema    Relevant Family History:   FAMILY HISTORY:      Relevant Social History: Alcohol ( -) , Tobacco ( -) , Illicit drugs (- )     Physical Exam:    Vital Signs:  Vital Signs Last 24 Hrs  T(C): 36.9 (2022 15:43), Max: 36.9 (2022 04:59)  T(F): 98.5 (2022 15:43), Max: 98.5 (2022 15:43)  HR: 129 (2022 15:43) (65 - 148)  BP: 86/65 (2022 15:43) (86/65 - 136/80)  BP(mean): --  RR: 17 (2022 15:43) (17 - 19)  SpO2: 97% (2022 15:43) (95% - 99%)  Daily     Daily Weight in k.8 (2022 04:59)    General:  Appears stated age, well-groomed, well-nourished, no distress  HEENT:  NC/AT,  conjunctivae clear and pink, no thyromegaly, nodules, adenopathy, no JVD, anicteric sclera  Chest:  Full & symmetric excursion, no increased effort, breath sounds clear  Cardiovascular:  Regular rhythm, S1, S2, no murmur/rub/S3/S4, no abdominal bruit, no edema  Abdomen:  Soft, non tender, non distended, normoactive bowel sounds,  no masses , no hepatosplenomegaly, no signs of chronic liver disease  Extremities:  no cyanosis, clubbing or edema  Skin:  No rash/erythema/ecchymoses/petechiae/wounds/abscess/warm/dry  Neuro/Psych:  Alert, oriented, no asterixis, no tremor, no encephalopathy    Laboratory:                          12.2   6.05  )-----------( 182      ( 2022 10:14 )             37.2     02-04    138  |  105  |  12  ----------------------------<  105<H>  3.7   |  24  |  0.76    Ca    9.4      2022 10:14  Phos  3.8     02-03  Mg     2.9     02-03    TPro  8.0  /  Alb  2.9<L>  /  TBili  3.1<H>  /  DBili  x   /  AST  116<H>  /  ALT  471<H>  /  AlkPhos  296<H>  02-04    LIVER FUNCTIONS - ( 2022 10:14 )  Alb: 2.9 g/dL / Pro: 8.0 gm/dL / ALK PHOS: 296 U/L / ALT: 471 U/L / AST: 116 U/L / GGT: x                   Intake and Output    22 @ 07:01  -  22 @ 07:00  --------------------------------------------------------  IN: 260 mL / OUT: 150 mL / NET: 110 mL        Imaging:  < from: CT Abdomen and Pelvis w/ IV Cont (22 @ 18:53) >    ACC: [id number] EXAM:  CT ABDOMEN AND PELVIS IC                          PROCEDURE DATE:  2022          INTERPRETATION:  CLINICAL INFORMATION: Elevated LFTs    COMPARISON: 3/30/2017.    CONTRAST/COMPLICATIONS:  IV Contrast: Omnipaque 350  90 ccadministered   10 cc discarded  Oral Contrast: NONE  Complications: None reported at time of study completion    PROCEDURE:  CT of the Abdomen and Pelvis was performed.  Sagittal and coronal reformats were performed. Patient is unable to raise   the left arm above the head    FINDINGS:  LOWER CHEST: Linear atelectasis or scarring at the lung bases. Small   pericardial effusion.    LIVER: Within normal limits.  BILE DUCTS: There is moderate intrahepatic ductal dilatation with dilated   CBD measuring up to 1.1 cm. Pancreatic duct is not dilated. No radiopaque   calculus is seen. The cystic duct also appears dilated.  GALLBLADDER: Within normal limits.  SPLEEN: Within normal limits.  PANCREAS: Within normal limits.  ADRENALS: Within normal limits.  KIDNEYS/URETERS: Within normal limits.    BLADDER: Within normal limits.  REPRODUCTIVE ORGANS: Uterus and adnexa within normal limits.    BOWEL: No bowel obstruction. Appendix is not visualized. No evidence of   inflammation in the pericecal region.  PERITONEUM: No ascites.  VESSELS: Within normal limits.  RETROPERITONEUM/LYMPH NODES: No lymphadenopathy.  ABDOMINAL WALL: Within normal limits.  BONES: Within normal limits.    IMPRESSION:  Small pericardial effusion    Intra and extrahepatic biliary ductal dilatation without radiopaque   calculus. No evidence of pancreatic ductal dilatation. Differential   diagnosis also includes cholangitis. Mass is felt less likely without   pancreatic ductal dilatation. Recommend additional imaging with MRI to   exclude noncalcified stone.    --- End of Report ---            JOSIE OZUNA MD; Attending Radiologist  This document has been electronically signed. 2022  7:37PM      < from: MR MRCP No Cont (22 @ 11:30) >    ACC: [id number] EXAM:  MR MRCP                          PROCEDURE DATE:  2022          INTERPRETATION:  CLINICAL INFORMATION: Hepatitis. Elevated liver enzymes.   Dilated common bile duct on CT scan.    COMPARISON: No prior abdominal MR is available for comparison. Reference   is made with a previous abdominal CT dated 2022    CONTRAST/COMPLICATIONS:  IV Contrast: NONE  Oral Contrast: NONE  Complications: None reported at time of study completion    PROCEDURE:  MRI of the abdomen was performed.  MRCP was performed.    FINDINGS:  LOWER CHEST: Small pericardial effusion.    LIVER: Within normal limits.  BILE DUCTS: 6 mm stone in the very distal common bile duct (image 16   series 8). Associated common bile duct dilatation measuring 8 mm in   caliber. Intrahepatic biliary ductal dilatation is noted as well. The   cystic duct is dilated.  GALLBLADDER: Underdistended.  SPLEEN: Within normal limits.  PANCREAS: Within normal limits. The main pancreatic duct maintains normal   caliber without dilatation.  ADRENALS: Within normal limits.  KIDNEYS/URETERS: The left kidney appears unremarkable. Small brightly T2   hyperintense lesion in the right kidney, representing a probable cyst. No   hydronephrosis.    VISUALIZED PORTIONS:  BOWEL:Within normal limits.  PERITONEUM: No ascites.  VESSELS: Within normal limits.  RETROPERITONEUM/LYMPH NODES: No lymphadenopathy.  ABDOMINAL WALL: Within normal limits.  BONES: Within normal limits.    IMPRESSION:  6 mm stone in the very distal commonbile duct. Associated common bile   duct dilatation measuring 8 mm in caliber. Intrahepatic biliary ductal   dilatation is noted as well. The cystic duct is dilated. The gallbladder   is underdistended.    Small pericardial effusion.    --- End of Report ---      RUSSELL YAÑEZ MD; Attending Radiologist  This document has been electronically signed. Feb  3 2022  2:54PM

## 2022-02-16 NOTE — CONSULT NOTE ADULT - CONSULT REQUESTED DATE/TIME
02-Feb-2022 11:00
03-Feb-2022 10:14
04-Feb-2022 18:20
16-Feb-2022 14:48
02-Feb-2022 09:41
07-Feb-2022 23:42
06-Feb-2022 02:30

## 2022-02-16 NOTE — PROGRESS NOTE ADULT - SUBJECTIVE AND OBJECTIVE BOX
VENANCIO CHAIREZ  MRN-[id number]    Follow Up: bacteremia, cholangitis      Interval History: The pt was seen and examined earlier, little changed, pt continues to refuse my exam and not interested in answering any of my questions. The pt is afebrile, on RA, appears to be comfortable.     PAST MEDICAL & SURGICAL HISTORY:  No pertinent past medical history    Malignant neoplasm of lung, unspecified laterality, unspecified part of lung    Gastroesophageal reflux disease, esophagitis presence not specified    Asthma, unspecified asthma severity, unspecified whether complicated, unspecified whether persistent    H/O craniotomy    Brain mass        ROS:    [x ] Unobtainable because: pt refuses to answer any of my questions   [ ] All other systems negative    Constitutional: no fever, no chills  Head: no trauma  Eyes: no vision changes, no eye pain  ENT:  no sore throat, no rhinorrhea  Cardiovascular:  no chest pain, no palpitation  Respiratory:  no SOB, no cough  GI:  no abd pain, no vomiting, no diarrhea  urinary: no dysuria, no hematuria, no flank pain  musculoskeletal:  no joint pain, no joint swelling  skin:  no rash  neurology:  no headache, no seizure, no change in mental status  psych: no anxiety, no depression         Allergies  codeine (Other)  latex (Rash)        ANTIMICROBIALS:  cefTRIAXone   IVPB 2000 every 24 hours      OTHER MEDS:  acetaminophen     Tablet .. 650 milliGRAM(s) Oral every 6 hours PRN  dexAMETHasone  Injectable 4 milliGRAM(s) IV Push two times a day  dextrose 40% Gel 15 Gram(s) Oral once  dextrose 50% Injectable 25 Gram(s) IV Push once  dextrose 50% Injectable 12.5 Gram(s) IV Push once  dextrose 50% Injectable 25 Gram(s) IV Push once  glucagon  Injectable 1 milliGRAM(s) IntraMuscular once  melatonin 3 milliGRAM(s) Oral at bedtime PRN  metoprolol tartrate Injectable 5 milliGRAM(s) IV Push every 6 hours  montelukast  Chewable 5 milliGRAM(s) Oral daily  pantoprazole    Tablet 40 milliGRAM(s) Oral before breakfast  polyethylene glycol 3350 17 Gram(s) Oral daily PRN  prochlorperazine   Tablet 10 milliGRAM(s) Oral every 6 hours PRN  valproate sodium IVPB 250 milliGRAM(s) IV Intermittent every 12 hours      Vital Signs Last 24 Hrs  T(C): 36.7 (16 Feb 2022 16:31), Max: 36.9 (16 Feb 2022 11:13)  T(F): 98 (16 Feb 2022 16:31), Max: 98.5 (16 Feb 2022 11:13)  HR: 97 (16 Feb 2022 16:31) (97 - 100)  BP: 115/69 (16 Feb 2022 16:31) (95/68 - 129/94)  BP(mean): --  RR: 18 (16 Feb 2022 16:31) (17 - 18)  SpO2: 99% (16 Feb 2022 16:31) (99% - 100%)    Physical Exam:  Constitutional: non-toxic, no distress  HEAD/EYES: anicteric, no conjunctival injection, scleral icterus, left scalp deformity with large scar due to prior craniotomy and brain surgery  ENT:  supple  Cardiovascular:   refused   Respiratory: refused   GI:  refused   Musculoskeletal:  moves all four extremities  Neurologic: awake and alert, at times does not want to answer questions  Skin: refused   Psychiatric:  awake, alert, frustrated    WBC Count: 12.94 K/uL (02-16 @ 16:16)  WBC Count: 13.06 K/uL (02-15 @ 18:19)  WBC Count: 18.35 K/uL (02-12 @ 11:28)  WBC Count: 20.70 K/uL (02-10 @ 14:17)                            8.1    12.94 )-----------( 570      ( 16 Feb 2022 16:16 )             24.6       02-16    134<L>  |  102  |  9   ----------------------------<  123<H>  4.1   |  25  |  0.55    Ca    9.1      16 Feb 2022 16:16    TPro  7.4  /  Alb  2.3<L>  /  TBili  0.9  /  DBili  x   /  AST  19  /  ALT  58  /  AlkPhos  225<H>  02-15          Creatinine Trend: 0.55<--, 0.63<--, 0.35<--, 0.49<--, 0.51<--, 0.60<--      MICROBIOLOGY:  v  .Blood Blood-Peripheral  02-09-22   Growth in aerobic bottle: Klebsiella aerogenes  See previous culture [id number]  --    Growth in aerobic bottle: Gram Negative Rods      .Blood Blood-Peripheral  02-08-22   No Growth Final  --  --      .Blood Blood-Peripheral  02-07-22   Growth in aerobic and anaerobic bottles: Klebsiella aerogenes  See previous culture [id number]  --    Growth in aerobic bottle:  Gram Negative Rods  Growth in anaerobic bottle: Gram Negative Rods      Clean Catch Clean Catch (Midstream)  02-07-22   10,000 - 49,000 CFU/mL Klebsiella aerogenes  --  Enterobacter aerogenes      .Blood Blood-Peripheral  02-07-22   Growth in aerobic and anaerobic bottles: Klebsiella aerogenes  See previous culture [id number]  --  Blood Culture PCR  Enterobacter aerogenes      Clean Catch Clean Catch (Midstream)  02-02-22   >100,000 CFU/ml Proteus mirabilis  <10,000 CFU/ml Normal Urogenital manish present  --  Proteus mirabilis  Ferritin, Serum: 930 (02-03)      SARS-CoV-2 Result: Bintec (02-15-22 @ 09:19)    COVID-19 PCR: Radha (03 Sep 2021 22:43)    RADIOLOGY:

## 2022-02-16 NOTE — PROGRESS NOTE ADULT - SUBJECTIVE AND OBJECTIVE BOX
INTERVAL HPI/OVERNIGHT EVENTS: Pt seen and examined at bedside. Pt refused to answer any questions.    47y  Vital Signs Last 24 Hrs  T(C): 36.7 (16 Feb 2022 16:31), Max: 36.9 (16 Feb 2022 11:13)  T(F): 98 (16 Feb 2022 16:31), Max: 98.5 (16 Feb 2022 11:13)  HR: 97 (16 Feb 2022 16:31) (97 - 100)  BP: 115/69 (16 Feb 2022 16:31) (95/68 - 129/94)  BP(mean): --  RR: 18 (16 Feb 2022 16:31) (17 - 18)  SpO2: 99% (16 Feb 2022 16:31) (99% - 100%)  I&O's Summary    15 Feb 2022 07:01  -  16 Feb 2022 07:00  --------------------------------------------------------  IN: 0 mL / OUT: 2450 mL / NET: -2450 mL      MEDICATIONS  (STANDING):  cefTRIAXone   IVPB 2000 milliGRAM(s) IV Intermittent every 24 hours  dexAMETHasone  Injectable 4 milliGRAM(s) IV Push two times a day  dextrose 40% Gel 15 Gram(s) Oral once  dextrose 50% Injectable 25 Gram(s) IV Push once  dextrose 50% Injectable 12.5 Gram(s) IV Push once  dextrose 50% Injectable 25 Gram(s) IV Push once  glucagon  Injectable 1 milliGRAM(s) IntraMuscular once  metoprolol tartrate Injectable 5 milliGRAM(s) IV Push every 6 hours  montelukast  Chewable 5 milliGRAM(s) Oral daily  pantoprazole    Tablet 40 milliGRAM(s) Oral before breakfast  valproate sodium IVPB 250 milliGRAM(s) IV Intermittent every 12 hours    MEDICATIONS  (PRN):  acetaminophen     Tablet .. 650 milliGRAM(s) Oral every 6 hours PRN Moderate Pain (4 - 6)  melatonin 3 milliGRAM(s) Oral at bedtime PRN Insomnia  polyethylene glycol 3350 17 Gram(s) Oral daily PRN Constipation  prochlorperazine   Tablet 10 milliGRAM(s) Oral every 6 hours PRN nausea, vomiting    LABS:    trop                        8.1    12.94 )-----------( 570      ( 16 Feb 2022 16:16 )             24.6     02-16    134<L>  |  102  |  9   ----------------------------<  123<H>  4.1   |  25  |  0.55    Ca    9.1      16 Feb 2022 16:16    TPro  7.4  /  Alb  2.3<L>  /  TBili  0.9  /  DBili  x   /  AST  19  /  ALT  58  /  AlkPhos  225<H>  02-15        CAPILLARY BLOOD GLUCOSE      POCT Blood Glucose.: 138 mg/dL (16 Feb 2022 11:06)      RADIOLOGY & ADDITIONAL TESTS:    Imaging Personally Reviewed:  [ ] YES  [ ] NO    Consultant(s) Notes Reviewed:  [x ] YES  [ ] NO    PHYSICAL EXAM:  GENERAL: Moderately built, no acute distress   CHEST/LUNG: Clear to ausculation bilaterally, no wheezing, no crackles   HEART: RRR, no murmur  ABDOMEN: patient continues to refuse abd exam.   EXTREMITIES:  , No clubbing, cyanosis, or edema  NERVOUS SYSTEM:  Limited exam due to poor participation.   Psychiatry: AA and orientated to her name. other questions patient did not answer but did follow simple commands.      A & P:        Care Discussed with Consultants/Other Providers [ x] YES  [ ] NO

## 2022-02-16 NOTE — CHART NOTE - NSCHARTNOTEFT_GEN_A_CORE
Assessment: Pt admitted for slurred speech; with brain mass lesion with surrounding edema, acute metabolic encephalopathy with sepsis with acute cholangitis with klebsiella bacteremia, elevated LFTs, transaminitis with CBD stone, anemia. Pt mostly non-compliant during LOS; some days pt refuses blood work and some oral meds.  PMHx includes asthma, lung cancer with known brain metastases, s/p XRT and chemotherapy at [facility], s/p left craniotomy and resection of mass in 09/2021.  Pt remains full code. Patient has been accepted to [facility] pending surgical bed availability and insurance authorization; awaiting transfer.    Factors impacting intake: [ ] none [ ] nausea  [ ] vomiting [ ] diarrhea [ ] constipation  [ ]chewing problems [ ] swallowing issues  [x] other: AMS; persistent lack of appetite    Diet Prescription: Diet, Regular:   Supplement Feeding Modality:  Oral  Health Shake Cans or Servings Per Day:  1       Frequency:  Two Times a day (02-09-22 @ 14:23)    Intake: Varies, 0-75% of meals/supplements consumed    Current Weight: 70.5 kg (2/14), 67.8 kg (2/4)  % Weight Change: 4% wt gain x 10 days  Fluid accumulation: No edema    Physical appearance: Pt with visible signs of muscle wasting/fat depletion in following regions: temporal(moderate on R side - no craniotomy), orbital(moderate), buccal(mild), clavicle(moderate/severe)    Pertinent Medications: MEDICATIONS  (STANDING):  cefTRIAXone   IVPB 2000 milliGRAM(s) IV Intermittent every 24 hours  dexAMETHasone  Injectable 4 milliGRAM(s) IV Push two times a day  dextrose 40% Gel 15 Gram(s) Oral once  dextrose 50% Injectable 25 Gram(s) IV Push once  dextrose 50% Injectable 12.5 Gram(s) IV Push once  dextrose 50% Injectable 25 Gram(s) IV Push once  glucagon  Injectable 1 milliGRAM(s) IntraMuscular once  metoprolol tartrate Injectable 5 milliGRAM(s) IV Push every 6 hours  montelukast  Chewable 5 milliGRAM(s) Oral daily  pantoprazole    Tablet 40 milliGRAM(s) Oral before breakfast  valproate sodium IVPB 250 milliGRAM(s) IV Intermittent every 12 hours    MEDICATIONS  (PRN):  acetaminophen     Tablet .. 650 milliGRAM(s) Oral every 6 hours PRN Moderate Pain (4 - 6)  melatonin 3 milliGRAM(s) Oral at bedtime PRN Insomnia  polyethylene glycol 3350 17 Gram(s) Oral daily PRN Constipation  prochlorperazine   Tablet 10 milliGRAM(s) Oral every 6 hours PRN nausea, vomiting    Pertinent Labs: 02-15 Na134 mmol/L<L> Glu 140 mg/dL<H> K+ 4.1 mmol/L Cr  0.63 mg/dL BUN 6 mg/dL<L> 02-15 Alb 2.3 g/dL<L> 02-03 Chol 187 mg/dL LDL 90 mg/dL  HDL 88 mg/dL Trig 45 mg/dL    POCT Blood Glucose.: 140 mg/dL (15 Feb 2022 17:17)    Skin: Pt without pressure ulcers    Estimated Needs:   [x] no change since previous assessment on 2/9  [ ] recalculated:     Previous Nutrition Diagnosis:   Nutrition Diagnostic Terminology #1 Malnutrition...     Malnutrition Moderate malnutrition in context of chronic illness.     Etiology Inadequate protein-energy intake related to lung cancer with mets to brain.     Signs/Symptoms physical signs of moderate muscle wasting and fat loss as noted.     Goal/Expected Outcome po intake >75% for meals/supplements - goal not met    Nutrition Diagnosis is [x] ongoing  [ ] resolved [ ] not applicable     New Nutrition Diagnosis: [x] not applicable       Interventions:   Recommend  [x] Continue with current diet rx as ordered  [ ] Change Diet To:  [ ] Nutrition Supplement  [ ] Nutrition Support  [x] Other: Encourage po intake; Cater to food preferences    Monitoring and Evaluation:   [ x ] PO intake [ x ] Tolerance to diet prescription [ x ] weights [ x ] labs[ x ] follow up per protocol  [ ] other: Assessment: Pt admitted for slurred speech; with brain mass lesion with surrounding edema, acute metabolic encephalopathy with sepsis with acute cholangitis with klebsiella bacteremia, elevated LFTs, transaminitis with CBD stone, anemia. Pt mostly non-compliant during LOS; some days pt refuses blood work and some oral meds.  PMHx includes asthma, lung cancer with known brain metastases, s/p XRT and chemotherapy at [facility], s/p left craniotomy and resection of mass in 09/2021.  Pt remains full code. Patient has been accepted to [facility] pending surgical bed availability and insurance authorization; awaiting transfer.    Factors impacting intake: [ ] none [ ] nausea  [ ] vomiting [ ] diarrhea [ ] constipation  [ ]chewing problems [ ] swallowing issues  [x] other: AMS; persistent lack of appetite    Diet Prescription: Diet, Regular:   Supplement Feeding Modality:  Oral  Health Shake Cans or Servings Per Day:  1       Frequency:  Two Times a day (02-09-22 @ 14:23)    Intake: Varies, 0-75% of meals/supplements consumed    Current Weight: 70.5 kg (2/14), 67.8 kg (2/4)  % Weight Change: 4% wt gain x 10 days  Fluid accumulation: No edema    Physical appearance: Pt with visible signs of muscle wasting/fat depletion in following regions: temporal(moderate on R side - no craniotomy), orbital(moderate), buccal(mild), clavicle(moderate/severe)    Pertinent Medications: MEDICATIONS  (STANDING):  cefTRIAXone   IVPB 2000 milliGRAM(s) IV Intermittent every 24 hours  dexAMETHasone  Injectable 4 milliGRAM(s) IV Push two times a day  dextrose 40% Gel 15 Gram(s) Oral once  dextrose 50% Injectable 25 Gram(s) IV Push once  dextrose 50% Injectable 12.5 Gram(s) IV Push once  dextrose 50% Injectable 25 Gram(s) IV Push once  glucagon  Injectable 1 milliGRAM(s) IntraMuscular once  metoprolol tartrate Injectable 5 milliGRAM(s) IV Push every 6 hours  montelukast  Chewable 5 milliGRAM(s) Oral daily  pantoprazole    Tablet 40 milliGRAM(s) Oral before breakfast  valproate sodium IVPB 250 milliGRAM(s) IV Intermittent every 12 hours    MEDICATIONS  (PRN):  acetaminophen     Tablet .. 650 milliGRAM(s) Oral every 6 hours PRN Moderate Pain (4 - 6)  melatonin 3 milliGRAM(s) Oral at bedtime PRN Insomnia  polyethylene glycol 3350 17 Gram(s) Oral daily PRN Constipation  prochlorperazine   Tablet 10 milliGRAM(s) Oral every 6 hours PRN nausea, vomiting    Pertinent Labs: 02-15 Na134 mmol/L<L> Glu 140 mg/dL<H> K+ 4.1 mmol/L Cr  0.63 mg/dL BUN 6 mg/dL<L> 02-15 Alb 2.3 g/dL<L> 02-03 Chol 187 mg/dL LDL 90 mg/dL  HDL 88 mg/dL Trig 45 mg/dL  02-03 HgbA1c 5.5%    POCT Blood Glucose.: 140 mg/dL (15 Feb 2022 17:17)    Skin: Pt without pressure ulcers    Estimated Needs:   [x] no change since previous assessment on 2/9  [ ] recalculated:     Previous Nutrition Diagnosis:   Nutrition Diagnostic Terminology #1 Malnutrition...     Malnutrition Moderate malnutrition in context of chronic illness.     Etiology Inadequate protein-energy intake related to lung cancer with mets to brain.     Signs/Symptoms physical signs of moderate muscle wasting and fat loss as noted.     Goal/Expected Outcome po intake >75% for meals/supplements - goal not met    Nutrition Diagnosis is [x] ongoing  [ ] resolved [ ] not applicable     New Nutrition Diagnosis: [x] not applicable       Interventions:   Recommend  [x] Continue with current diet rx as ordered  [ ] Change Diet To:  [ ] Nutrition Supplement  [ ] Nutrition Support  [x] Other: Encourage po intake; Cater to food preferences    Monitoring and Evaluation:   [ x ] PO intake [ x ] Tolerance to diet prescription [ x ] weights [ x ] labs[ x ] follow up per protocol  [ ] other:

## 2022-02-16 NOTE — PROGRESS NOTE ADULT - SUBJECTIVE AND OBJECTIVE BOX
lying in bed    Vital Signs Last 24 Hrs  T(C): 36.6 (16 Feb 2022 06:18), Max: 36.8 (15 Feb 2022 16:24)  T(F): 97.8 (16 Feb 2022 06:18), Max: 98.3 (15 Feb 2022 16:24)  HR: 100 (16 Feb 2022 06:18) (85 - 100)  BP: 129/94 (16 Feb 2022 06:18) (96/66 - 129/94)  BP(mean): --  RR: 18 (15 Feb 2022 16:24) (18 - 19)  SpO2: 99% (16 Feb 2022 06:18) (99% - 100%)    PHYSICAL EXAM:    general - weak looking +   HEENT - No Icterus  CVS - RRR  RS - AE B/L  Abd - soft, NT  Ext - Pulses +        LABS:                        8.5    13.06 )-----------( 503      ( 15 Feb 2022 18:19 )             25.9     02-15    134<L>  |  102  |  6<L>  ----------------------------<  140<H>  4.1   |  24  |  0.63    Ca    8.6      15 Feb 2022 18:19    TPro  7.4  /  Alb  2.3<L>  /  TBili  0.9  /  DBili  x   /  AST  19  /  ALT  58  /  AlkPhos  225<H>  02-15          Culture - Blood (collected 09 Feb 2022 00:56)  Source: .Blood Blood-Peripheral  Gram Stain (14 Feb 2022 11:32):    Growth in aerobic bottle: Gram Negative Rods  Final Report (14 Feb 2022 11:32):    Growth in aerobic bottle: Klebsiella aerogenes    See previous culture [id number]    Culture - Blood (collected 08 Feb 2022 18:12)  Source: .Blood Blood-Peripheral  Final Report (13 Feb 2022 19:00):    No Growth Final    Culture - Blood (collected 07 Feb 2022 15:02)  Source: .Blood Blood-Peripheral  Gram Stain (09 Feb 2022 09:27):    Growth in aerobic and anaerobic bottles: Gram Negative Rods  Final Report (09 Feb 2022 09:27):    Growth in aerobic and anaerobic bottles: Klebsiella aerogenes    See previous culture [id number]    Culture - Blood (collected 07 Feb 2022 15:02)  Source: .Blood Blood-Peripheral  Gram Stain (09 Feb 2022 10:15):    Growth in aerobic bottle:    Gram Negative Rods    Growth in anaerobic bottle: Gram Negative Rods  Final Report (09 Feb 2022 10:15):    Growth in aerobic and anaerobic bottles: Klebsiella aerogenes    See previous culture [id number]    Culture - Urine (collected 07 Feb 2022 09:07)  Source: Clean Catch Clean Catch (Midstream)  Final Report (09 Feb 2022 18:57):    10,000 - 49,000 CFU/mL Klebsiella aerogenes  Organism: Enterobacter aerogenes (09 Feb 2022 18:57)  Organism: Enterobacter aerogenes (09 Feb 2022 18:57)    Culture - Blood (collected 07 Feb 2022 00:35)  Source: .Blood Blood-Peripheral  Gram Stain (09 Feb 2022 09:00):    Growth in aerobic bottle: Gram Negative Rods    Growth in anaerobic bottle: Gram Negative Rods  Final Report (09 Feb 2022 09:00):    Growth in aerobic and anaerobic bottles: Klebsiella aerogenes    ***Blood Panel PCR results on this specimen are available    approximately 3 hours after the Gram stain result.***    Gram stain, PCR, and/or culture results may not always    correspond due to difference in methodologies.    ************************************************************    This PCR assay was performed by multiplex PCR. This    Assay tests for 66 bacterial and resistance gene targets.    Please refer to the [facility] test directory    at [email or web address] for details.  Organism: Blood Culture PCR  Enterobacter aerogenes (09 Feb 2022 09:00)  Organism: Enterobacter aerogenes (09 Feb 2022 09:00)  Organism: Blood Culture PCR (09 Feb 2022 09:00)    Culture - Blood (collected 07 Feb 2022 00:35)  Source: .Blood Blood-Peripheral  Gram Stain (09 Feb 2022 10:33):    Growth in aerobic and anaerobic bottles: Gram Negative Rods  Final Report (09 Feb 2022 10:33):    Growth in aerobic and anaerobic bottles: Klebsiella aerogenes    See previous culture [id number]

## 2022-02-16 NOTE — PROGRESS NOTE ADULT - ASSESSMENT
HPI: 44 years old female with h/o asthma, NSCLC with known brain metastases, S/P XRT and chemotherapy at [facility] lung cancer S/P left craniotomy and resection of mass in 09/2021 (Dr Parviz Paul) present to ED with slurry speech started at 6AM, last known normal was 4AM  Tachycardic to 120s ( per patient, baseline HR in 120), BP stable, afebrile sat well at RA. WBC 13.36, K 3.5, Cr 1.17, AST/ALT 1207/1231, lipase normal, CK 78. CT head with Interval decompressive left frontotemporal craniectomy with decreased mass effect and resolved rightward midline shift secondary to significant left frontal vasogenic edema. No acute intracranial hemorrhage, extra-axial collection or new suspicious region of vasogenic edema. CTA head/neck with no vaso-occlusive disease. CT perfusion-26 mL of tissue with elevated time to maximum in the left superior medial frontal lobe, likely representing chronic posttreatment changes. Not a candidate for TPA.     Course:     Brain mass lesion w/surrounding edema   present with slurry speech and slight right leg weakness  CT head with Interval decompressive left frontotemporal craniectomy with decreased mass effect and resolved rightward midline shift secondary to significant left frontal vasogenic edema. No acute intracranial hemorrhage, extra-axial collection or new suspicious region of vasogenic edema. CTA head/neck with no vaso-occlusive disease. CT perfusion-26 mL of tissue with elevated time to maximum in the left superior medial frontal lobe, likely representing chronic posttreatment changes  Neurology following.   Cont decadrone.   No statin due to elevated LFT  No aspirin due to thrombocytopenia.   MRI brain with and without contrast ;  New postop changes are identified with a large area of abnormal enhancement seen involving the left frontal cortical subcortical region with increased surrounding edema mass effect on left lateral ventricle left-to-right shift. This finding is worrisome for progression of patient's underlying disease process  EEG noted  PT/OT/Speech eval passed; regular diet  2/6 CTH- no bleed    acute metabolic encephalopathy with sepsis with acute cholangitis with klebsiella bacteremia.    HIT Ab negative, Fibrinogen not indicative of DIC  s/p platelet and blood transfusions. H and H stable.   Cont iv ceftriaxone.    Elevated LFTs. Transaminitis with CBD stone   Improving  New abnormality. No history of ETOH, drug use, recent change in medications  Acute hepatitis panel Neg  GI on board  reviewed ID and IR notes.   I discussed with Dr Oglesby at [facility] about this patient and she has accepted the patient for higher level of care but unfortunately still pending insurance authorization and surgical bed availability.     NSCLC metastatic to brain.   ·  Plan: NSCLC with known brain metastases, S/P XRT and chemotherapy at [facility] lung cancer S/P left craniotomy and resection of mass in 09/2021  On Tagrisso 80mg daily at home.  Hematology/oncology on board.  Routine EEG with left sided dysfunction, no epileptogenic discharge  Continue with  Decadron 4mg BID for Brain edema  No need for Aspirin at this time    Mild intermittent asthma. Not in exacerbation. albuterol prn.    Hypocalcemia after correction for hypoalbuminemia is 8.3.     Anemia. no active gross bleeding reported. FOBT is negative.     Full Code  Care manager Apmaro from [facility] called and updated me that patient has been accepted pending surgical bed availability and insurance auth.   [facility] for [facility].

## 2022-02-16 NOTE — CONSULT NOTE ADULT - PROVIDER SPECIALTY LIST ADULT
Critical Care
Palliative Care
Intervent Radiology
Neurology
Gastroenterology
Infectious Disease
Heme/Onc
Gastroenterology

## 2022-02-17 LAB
GLUCOSE BLDC GLUCOMTR-MCNC: 101 MG/DL — HIGH (ref 70–99)
GLUCOSE BLDC GLUCOMTR-MCNC: 143 MG/DL — HIGH (ref 70–99)
GLUCOSE BLDC GLUCOMTR-MCNC: 151 MG/DL — HIGH (ref 70–99)
VALPROATE SERPL-MCNC: 24 UG/ML — LOW (ref 50–100)

## 2022-02-17 PROCEDURE — 99233 SBSQ HOSP IP/OBS HIGH 50: CPT

## 2022-02-17 RX ADMIN — CEFTRIAXONE 100 MILLIGRAM(S): 500 INJECTION, POWDER, FOR SOLUTION INTRAMUSCULAR; INTRAVENOUS at 16:48

## 2022-02-17 RX ADMIN — Medication 4 MILLIGRAM(S): at 04:42

## 2022-02-17 RX ADMIN — Medication 26.25 MILLIGRAM(S): at 04:41

## 2022-02-17 NOTE — PROGRESS NOTE ADULT - SUBJECTIVE AND OBJECTIVE BOX
INTERVAL HPI/OVERNIGHT EVENTS: Pt seen and examined at bedside. Pt refused to answer any questions.    47y  Vital Signs Last 24 Hrs  T(C): 36.7 (16 Feb 2022 16:31), Max: 36.7 (16 Feb 2022 16:31)  T(F): 98 (16 Feb 2022 16:31), Max: 98 (16 Feb 2022 16:31)  HR: 88 (17 Feb 2022 04:31) (88 - 97)  BP: 101/69 (17 Feb 2022 04:31) (101/69 - 115/69)  BP(mean): --  RR: 16 (17 Feb 2022 04:31) (16 - 18)  SpO2: 98% (17 Feb 2022 04:31) (98% - 99%)  I&O's Summary    16 Feb 2022 07:01  -  17 Feb 2022 07:00  --------------------------------------------------------  IN: 0 mL / OUT: 1350 mL / NET: -1350 mL      MEDICATIONS  (STANDING):  cefTRIAXone   IVPB 2000 milliGRAM(s) IV Intermittent every 24 hours  dexAMETHasone  Injectable 4 milliGRAM(s) IV Push two times a day  dextrose 40% Gel 15 Gram(s) Oral once  dextrose 50% Injectable 25 Gram(s) IV Push once  dextrose 50% Injectable 12.5 Gram(s) IV Push once  dextrose 50% Injectable 25 Gram(s) IV Push once  glucagon  Injectable 1 milliGRAM(s) IntraMuscular once  metoprolol tartrate Injectable 5 milliGRAM(s) IV Push every 6 hours  montelukast  Chewable 5 milliGRAM(s) Oral daily  pantoprazole    Tablet 40 milliGRAM(s) Oral before breakfast  valproate sodium IVPB 250 milliGRAM(s) IV Intermittent every 12 hours    MEDICATIONS  (PRN):  acetaminophen     Tablet .. 650 milliGRAM(s) Oral every 6 hours PRN Moderate Pain (4 - 6)  melatonin 3 milliGRAM(s) Oral at bedtime PRN Insomnia  polyethylene glycol 3350 17 Gram(s) Oral daily PRN Constipation  prochlorperazine   Tablet 10 milliGRAM(s) Oral every 6 hours PRN nausea, vomiting    LABS:    trop                        8.1    12.94 )-----------( 570      ( 16 Feb 2022 16:16 )             24.6     02-16    134<L>  |  102  |  9   ----------------------------<  123<H>  4.1   |  25  |  0.55    Ca    9.1      16 Feb 2022 16:16    TPro  7.4  /  Alb  2.3<L>  /  TBili  0.9  /  DBili  x   /  AST  19  /  ALT  58  /  AlkPhos  225<H>  02-15        CAPILLARY BLOOD GLUCOSE      POCT Blood Glucose.: 101 mg/dL (17 Feb 2022 11:29)  POCT Blood Glucose.: 136 mg/dL (16 Feb 2022 17:38)    PHYSICAL EXAM:  GENERAL: Moderately built, no acute distress   CHEST/LUNG: Clear to ausculation bilaterally, no wheezing, no crackles   HEART: RRR, no murmur  ABDOMEN: patient continues to refuse abd exam.   EXTREMITIES:  , No clubbing, cyanosis, or edema  NERVOUS SYSTEM:  Limited exam due to poor participation.   Psychiatry: AA and orientated to her name. other questions patient did not answer.      A & P:        Care Discussed with Consultants/Other Providers [ x] YES  [ ] NO

## 2022-02-17 NOTE — PROGRESS NOTE ADULT - ASSESSMENT
HPI: 44 years old female with h/o asthma, NSCLC with known brain metastases, S/P XRT and chemotherapy at [facility] lung cancer S/P left craniotomy and resection of mass in 09/2021 (Dr Parviz Paul) present to ED with slurry speech started at 6AM, last known normal was 4AM  Tachycardic to 120s ( per patient, baseline HR in 120), BP stable, afebrile sat well at RA. WBC 13.36, K 3.5, Cr 1.17, AST/ALT 1207/1231, lipase normal, CK 78. CT head with Interval decompressive left frontotemporal craniectomy with decreased mass effect and resolved rightward midline shift secondary to significant left frontal vasogenic edema. No acute intracranial hemorrhage, extra-axial collection or new suspicious region of vasogenic edema. CTA head/neck with no vaso-occlusive disease. CT perfusion-26 mL of tissue with elevated time to maximum in the left superior medial frontal lobe, likely representing chronic posttreatment changes.    Course:     Brain mass lesion w/surrounding edema   present with slurry speech and slight right leg weakness  CT head with Interval decompressive left frontotemporal craniectomy with decreased mass effect and resolved rightward midline shift secondary to significant left frontal vasogenic edema. No acute intracranial hemorrhage, extra-axial collection or new suspicious region of vasogenic edema. CTA head/neck with no vaso-occlusive disease. CT perfusion-26 mL of tissue with elevated time to maximum in the left superior medial frontal lobe, likely representing chronic posttreatment changes  Neurology recs apprec  Cont decadron.   No statin due to elevated LFT  No aspirin due to thrombocytopenia.   MRI brain with and without contrast ;  New postop changes are identified with a large area of abnormal enhancement seen involving the left frontal cortical subcortical region with increased surrounding edema mass effect on left lateral ventricle left-to-right shift. This finding is worrisome for progression of patient's underlying disease process  EEG noted  PT/OT/Speech eval passed; regular diet  2/6 CTH- no bleed  c/w decadron, depakon, f/u levels.    acute metabolic encephalopathy with sepsis with acute cholangitis with klebsiella bacteremia.    HIT Ab negative, Fibrinogen not indicative of DIC  s/p platelet and blood transfusions. H and H stable.   Cont iv ceftriaxone.  f/u Rpt blood cx.    Elevated LFTs. Transaminitis with CBD stone   Improving  New abnormality. No history of ETOH, drug use, recent change in medications  Acute hepatitis panel Neg  GI on board  reviewed ID and IR notes.   My colleague discussed with Dr [facility]lesby at [facility] about this patient and she has accepted the patient for higher level of care but unfortunately still pending insurance authorization and surgical bed availability.     NSCLC metastatic to brain.   ·  Plan: NSCLC with known brain metastases, S/P XRT and chemotherapy at [facility] lung cancer S/P left craniotomy and resection of mass in 09/2021  On Tagrisso 80mg daily at home.  Hematology/oncology on board.  Routine EEG with left sided dysfunction, no epileptogenic discharge  Continue with  Decadron 4mg BID for Brain edema  No need for Aspirin at this time    Mild intermittent asthma. Not in exacerbation. albuterol prn.    Hypocalcemia after correction for hypoalbuminemia is 8.3.     Anemia. no active gross bleeding reported. FOBT is negative.     Full Code  Care manager Amparo from [facility] called and updated that patient has been accepted pending surgical bed availability and insurance auth.   Spoke with Pillo with [facility] ( 2/16), pt awaiting for insurance auth for transfer to [facility].

## 2022-02-17 NOTE — CHART NOTE - [facility] PROVIDER LOCATION
[facility] Detail Level: Zone Photo Preface (Leave Blank If You Do Not Want): Photographs were obtained today Details (Free Text): Left posterior shoulder \\n3.5x1.5 - recommended to come in every two years for evaluation

## 2022-02-17 NOTE — PROGRESS NOTE ADULT - SUBJECTIVE AND OBJECTIVE BOX
lying in bed    Vital Signs Last 24 Hrs  T(C): 36.7 (16 Feb 2022 16:31), Max: 36.9 (16 Feb 2022 11:13)  T(F): 98 (16 Feb 2022 16:31), Max: 98.5 (16 Feb 2022 11:13)  HR: 88 (17 Feb 2022 04:31) (88 - 100)  BP: 101/69 (17 Feb 2022 04:31) (95/68 - 115/69)  BP(mean): --  RR: 16 (17 Feb 2022 04:31) (16 - 18)  SpO2: 98% (17 Feb 2022 04:31) (98% - 100%)    PHYSICAL EXAM:    general - weak looking +  HEENT - No Icterus  CVS - RRR  RS - AE B/L  Abd - soft, NT  Ext - Pulses +        LABS:                        8.1    12.94 )-----------( 570      ( 16 Feb 2022 16:16 )             24.6     02-16    134<L>  |  102  |  9   ----------------------------<  123<H>  4.1   |  25  |  0.55    Ca    9.1      16 Feb 2022 16:16    TPro  7.4  /  Alb  2.3<L>  /  TBili  0.9  /  DBili  x   /  AST  19  /  ALT  58  /  AlkPhos  225<H>  02-15          Culture - Blood (collected 09 Feb 2022 00:56)  Source: .Blood Blood-Peripheral  Gram Stain (14 Feb 2022 11:32):    Growth in aerobic bottle: Gram Negative Rods  Final Report (14 Feb 2022 11:32):    Growth in aerobic bottle: Klebsiella aerogenes    See previous culture [id number]    Culture - Blood (collected 08 Feb 2022 18:12)  Source: .Blood Blood-Peripheral  Final Report (13 Feb 2022 19:00):    No Growth Final    Culture - Blood (collected 07 Feb 2022 15:02)  Source: .Blood Blood-Peripheral  Gram Stain (09 Feb 2022 09:27):    Growth in aerobic and anaerobic bottles: Gram Negative Rods  Final Report (09 Feb 2022 09:27):    Growth in aerobic and anaerobic bottles: Klebsiella aerogenes    See previous culture [id number]    Culture - Blood (collected 07 Feb 2022 15:02)  Source: .Blood Blood-Peripheral  Gram Stain (09 Feb 2022 10:15):    Growth in aerobic bottle:    Gram Negative Rods    Growth in anaerobic bottle: Gram Negative Rods  Final Report (09 Feb 2022 10:15):    Growth in aerobic and anaerobic bottles: Klebsiella aerogenes    See previous culture [id number]

## 2022-02-18 PROCEDURE — 99232 SBSQ HOSP IP/OBS MODERATE 35: CPT

## 2022-02-18 RX ADMIN — Medication 4 MILLIGRAM(S): at 06:17

## 2022-02-18 RX ADMIN — Medication 4 MILLIGRAM(S): at 17:56

## 2022-02-18 RX ADMIN — CEFTRIAXONE 100 MILLIGRAM(S): 500 INJECTION, POWDER, FOR SOLUTION INTRAMUSCULAR; INTRAVENOUS at 13:15

## 2022-02-18 RX ADMIN — Medication 26.25 MILLIGRAM(S): at 17:56

## 2022-02-18 RX ADMIN — Medication 26.25 MILLIGRAM(S): at 06:16

## 2022-02-18 NOTE — PROGRESS NOTE ADULT - SUBJECTIVE AND OBJECTIVE BOX
lying in bed    Vital Signs Last 24 Hrs  T(C): 36.7 (18 Feb 2022 06:33), Max: 36.7 (18 Feb 2022 06:33)  T(F): 98.1 (18 Feb 2022 06:33), Max: 98.1 (18 Feb 2022 06:33)  HR: 92 (18 Feb 2022 06:33) (88 - 92)  BP: 93/64 (18 Feb 2022 06:33) (93/64 - 99/66)  BP(mean): --  RR: 18 (18 Feb 2022 06:33) (18 - 18)  SpO2: 99% (18 Feb 2022 06:33) (99% - 100%)    PHYSICAL EXAM:    general - weak looking +  HEENT - No Icterus  CVS - RRR  RS - AE B/L  Abd - soft, NT  Ext - Pulses +        LABS:                        8.1    12.94 )-----------( 570      ( 16 Feb 2022 16:16 )             24.6     02-16    134<L>  |  102  |  9   ----------------------------<  123<H>  4.1   |  25  |  0.55    Ca    9.1      16 Feb 2022 16:16            Culture - Blood (collected 17 Feb 2022 01:27)  Source: .Blood Blood-Peripheral  Preliminary Report (18 Feb 2022 02:00):    No growth to date.    Culture - Blood (collected 17 Feb 2022 01:27)  Source: .Blood Blood-Peripheral  Preliminary Report (18 Feb 2022 02:00):    No growth to date.    Culture - Blood (collected 09 Feb 2022 00:56)  Source: .Blood Blood-Peripheral  Gram Stain (14 Feb 2022 11:32):    Growth in aerobic bottle: Gram Negative Rods  Final Report (14 Feb 2022 11:32):    Growth in aerobic bottle: Klebsiella aerogenes    See previous culture [id number]    Culture - Blood (collected 08 Feb 2022 18:12)  Source: .Blood Blood-Peripheral  Final Report (13 Feb 2022 19:00):    No Growth Final

## 2022-02-18 NOTE — PROGRESS NOTE ADULT - ASSESSMENT
HPI: 44 years old female with h/o asthma, NSCLC with known brain metastases, S/P XRT and chemotherapy at [facility] lung cancer S/P left craniotomy and resection of mass in 09/2021 (Dr Parviz Paul) present to ED with slurry speech started at 6AM, last known normal was 4AM  Tachycardic to 120s ( per patient, baseline HR in 120), BP stable, afebrile sat well at RA. WBC 13.36, K 3.5, Cr 1.17, AST/ALT 1207/1231, lipase normal, CK 78. CT head with Interval decompressive left frontotemporal craniectomy with decreased mass effect and resolved rightward midline shift secondary to significant left frontal vasogenic edema. No acute intracranial hemorrhage, extra-axial collection or new suspicious region of vasogenic edema. CTA head/neck with no vaso-occlusive disease. CT perfusion-26 mL of tissue with elevated time to maximum in the left superior medial frontal lobe, likely representing chronic posttreatment changes.    Course:     Brain mass lesion w/surrounding edema   present with slurry speech and slight right leg weakness  CT head with Interval decompressive left frontotemporal craniectomy with decreased mass effect and resolved rightward midline shift secondary to significant left frontal vasogenic edema. No acute intracranial hemorrhage, extra-axial collection or new suspicious region of vasogenic edema. CTA head/neck with no vaso-occlusive disease. CT perfusion-26 mL of tissue with elevated time to maximum in the left superior medial frontal lobe, likely representing chronic posttreatment changes  Neurology recs apprec  Cont decadron.   No statin due to elevated LFT  No aspirin due to thrombocytopenia.   MRI brain with and without contrast ;  New postop changes are identified with a large area of abnormal enhancement seen involving the left frontal cortical subcortical region with increased surrounding edema mass effect on left lateral ventricle left-to-right shift. This finding is worrisome for progression of patient's underlying disease process  EEG noted  PT/OT/Speech eval passed; regular diet  2/6 CTH- no bleed  c/w decadron, depakon, f/u levels.    acute metabolic encephalopathy with sepsis with acute cholangitis with klebsiella bacteremia.    HIT Ab negative, Fibrinogen not indicative of DIC  s/p platelet and blood transfusions. H and H stable.   Cont iv ceftriaxone.  f/u Rpt blood cx.    Elevated LFTs. Transaminitis with CBD stone   Improving  New abnormality. No history of ETOH, drug use, recent change in medications  Acute hepatitis panel Neg  GI on board  reviewed ID and IR notes.   My colleague discussed with Dr Oglesby at [facility] about this patient and she has accepted the patient for higher level of care but unfortunately still pending insurance authorization and surgical bed availability.     NSCLC metastatic to brain.   ·  Plan: NSCLC with known brain metastases, S/P XRT and chemotherapy at [facility] lung cancer S/P left craniotomy and resection of mass in 09/2021  On Tagrisso 80mg daily at home.  Hematology/oncology on board.  Routine EEG with left sided dysfunction, no epileptogenic discharge  Continue with  Decadron 4mg BID for Brain edema  No need for Aspirin at this time    Mild intermittent asthma. Not in exacerbation. albuterol prn.    Hypocalcemia after correction for hypoalbuminemia is 8.3.     Anemia. no active gross bleeding reported. FOBT is negative.     Full Code  Care manager Amparo from [facility] called and updated that patient has been accepted pending surgical bed availability and insurance auth.   Spoke with Pillo with [facility] ( 2/16), pt awaiting for insurance auth for transfer to [facility].

## 2022-02-18 NOTE — PROGRESS NOTE ADULT - SUBJECTIVE AND OBJECTIVE BOX
INTERVAL HPI/OVERNIGHT EVENTS: Pt seen and examined at bedside. Pt refused to answer any questions.    47y  Vital Signs Last 24 Hrs  T(C): 36.3 (18 Feb 2022 16:37), Max: 36.7 (18 Feb 2022 06:33)  T(F): 97.4 (18 Feb 2022 16:37), Max: 98.1 (18 Feb 2022 06:33)  HR: 109 (18 Feb 2022 16:37) (77 - 109)  BP: 108/69 (18 Feb 2022 16:37) (93/64 - 108/69)  BP(mean): --  RR: 18 (18 Feb 2022 16:37) (18 - 18)  SpO2: 100% (18 Feb 2022 16:37) (99% - 100%)      MEDICATIONS  (STANDING):  cefTRIAXone   IVPB 2000 milliGRAM(s) IV Intermittent every 24 hours  dexAMETHasone  Injectable 4 milliGRAM(s) IV Push two times a day  dextrose 40% Gel 15 Gram(s) Oral once  dextrose 50% Injectable 25 Gram(s) IV Push once  dextrose 50% Injectable 12.5 Gram(s) IV Push once  dextrose 50% Injectable 25 Gram(s) IV Push once  glucagon  Injectable 1 milliGRAM(s) IntraMuscular once  metoprolol tartrate Injectable 5 milliGRAM(s) IV Push every 6 hours  montelukast  Chewable 5 milliGRAM(s) Oral daily  pantoprazole    Tablet 40 milliGRAM(s) Oral before breakfast  valproate sodium IVPB 250 milliGRAM(s) IV Intermittent every 12 hours    MEDICATIONS  (PRN):  acetaminophen     Tablet .. 650 milliGRAM(s) Oral every 6 hours PRN Moderate Pain (4 - 6)  melatonin 3 milliGRAM(s) Oral at bedtime PRN Insomnia  polyethylene glycol 3350 17 Gram(s) Oral daily PRN Constipation  prochlorperazine   Tablet 10 milliGRAM(s) Oral every 6 hours PRN nausea, vomiting      PHYSICAL EXAM:  GENERAL: Moderately built, no acute distress   CHEST/LUNG: Clear to ausculation bilaterally, no wheezing, no crackles   HEART: RRR, no murmur  ABDOMEN: patient continues to refuse abd exam.   EXTREMITIES:  , No clubbing, cyanosis, or edema  NERVOUS SYSTEM:  Limited exam due to poor participation.   Psychiatry: AA and orientated to her name. other questions patient did not answer.      A & P:        Care Discussed with Consultants/Other Providers [ x] YES  [ ] NO

## 2022-02-19 PROCEDURE — 99232 SBSQ HOSP IP/OBS MODERATE 35: CPT

## 2022-02-19 RX ADMIN — Medication 4 MILLIGRAM(S): at 16:12

## 2022-02-19 RX ADMIN — Medication 4 MILLIGRAM(S): at 05:41

## 2022-02-19 RX ADMIN — MONTELUKAST 5 MILLIGRAM(S): 4 TABLET, CHEWABLE ORAL at 14:22

## 2022-02-19 RX ADMIN — Medication 26.25 MILLIGRAM(S): at 16:26

## 2022-02-19 RX ADMIN — Medication 26.25 MILLIGRAM(S): at 05:42

## 2022-02-19 NOTE — PROGRESS NOTE ADULT - SUBJECTIVE AND OBJECTIVE BOX
Patient is a 47y old  Female who presents with a chief complaint of CVA (18 Feb 2022 18:52)    INTERVAL HPI/OVERNIGHT EVENTS: not participating in interview     MEDICATIONS  (STANDING):  dexAMETHasone  Injectable 4 milliGRAM(s) IV Push two times a day  dextrose 40% Gel 15 Gram(s) Oral once  dextrose 50% Injectable 25 Gram(s) IV Push once  dextrose 50% Injectable 12.5 Gram(s) IV Push once  dextrose 50% Injectable 25 Gram(s) IV Push once  glucagon  Injectable 1 milliGRAM(s) IntraMuscular once  metoprolol tartrate Injectable 5 milliGRAM(s) IV Push every 6 hours  montelukast  Chewable 5 milliGRAM(s) Oral daily  pantoprazole    Tablet 40 milliGRAM(s) Oral before breakfast  valproate sodium IVPB 250 milliGRAM(s) IV Intermittent every 12 hours    MEDICATIONS  (PRN):  acetaminophen     Tablet .. 650 milliGRAM(s) Oral every 6 hours PRN Moderate Pain (4 - 6)  melatonin 3 milliGRAM(s) Oral at bedtime PRN Insomnia  polyethylene glycol 3350 17 Gram(s) Oral daily PRN Constipation  prochlorperazine   Tablet 10 milliGRAM(s) Oral every 6 hours PRN nausea, vomiting    Allergies    codeine (Other)  latex (Rash)    Intolerances      REVIEW OF SYSTEMS:  All other systems reviewed and are negative    Vital Signs Last 24 Hrs  T(C): 36.8 (19 Feb 2022 05:41), Max: 36.8 (19 Feb 2022 05:41)  T(F): 98.3 (19 Feb 2022 05:41), Max: 98.3 (19 Feb 2022 05:41)  HR: 78 (19 Feb 2022 05:41) (77 - 109)  BP: 97/60 (19 Feb 2022 05:41) (97/60 - 113/80)  BP(mean): --  RR: 16 (19 Feb 2022 05:41) (16 - 18)  SpO2: 100% (19 Feb 2022 05:41) (99% - 100%)  Daily     Daily   I&O's Summary    CAPILLARY BLOOD GLUCOSE        PHYSICAL EXAM:  GENERAL: NAD,    HEAD:  Atraumatic, Normocephalic  EYES: EOMI, PERRLA, conjunctiva and sclera clear  ENMT: No tonsillar erythema, exudates, or enlargement; Moist mucous membranes, Good dentition, No lesions  NECK: Supple, No JVD, Normal thyroid  CHEST/LUNG: Clear to percussion bilaterally; No rales, rhonchi, wheezing, or rubs  HEART: Regular rate and rhythm; No murmurs, rubs, or gallops  ABDOMEN: Soft, Nontender, Nondistended; Bowel sounds present  EXTREMITIES:  2+ Peripheral Pulses, No clubbing, cyanosis, or edema  LYMPH: No lymphadenopathy noted  SKIN: No rashes or lesions    Labs                      Culture - Blood (collected 17 Feb 2022 01:27)  Source: .Blood Blood-Peripheral  Preliminary Report (18 Feb 2022 02:00):    No growth to date.    Culture - Blood (collected 17 Feb 2022 01:27)  Source: .Blood Blood-Peripheral  Preliminary Report (18 Feb 2022 02:00):    No growth to date.                DVT prophylaxis: > Lovenox 40mg SQ daily  > Heparin   > SCD's

## 2022-02-19 NOTE — PROGRESS NOTE ADULT - PROBLEM SELECTOR PROBLEM 1
NSCLC metastatic to brain
Choledocholithiasis
NSCLC metastatic to brain
NSCLC metastatic to brain
Choledocholithiasis
NSCLC metastatic to brain
Choledocholithiasis

## 2022-02-19 NOTE — PROGRESS NOTE ADULT - PROBLEM SELECTOR PLAN 1
- steroids  - for transfer to [facility]  - will sign off, please re-consult as needed

## 2022-02-19 NOTE — PROGRESS NOTE ADULT - ASSESSMENT
HPI: 44 years old female with h/o asthma, NSCLC with known brain metastases, S/P XRT and chemotherapy at [facility] lung cancer S/P left craniotomy and resection of mass in 09/2021 (Dr Parviz Paul) present to ED with slurry speech started at 6AM, last known normal was 4AM  Tachycardic to 120s ( per patient, baseline HR in 120), BP stable, afebrile sat well at RA. WBC 13.36, K 3.5, Cr 1.17, AST/ALT 1207/1231, lipase normal, CK 78. CT head with Interval decompressive left frontotemporal craniectomy with decreased mass effect and resolved rightward midline shift secondary to significant left frontal vasogenic edema. No acute intracranial hemorrhage, extra-axial collection or new suspicious region of vasogenic edema. CTA head/neck with no vaso-occlusive disease. CT perfusion-26 mL of tissue with elevated time to maximum in the left superior medial frontal lobe, likely representing chronic posttreatment changes.    Course:     Brain mass lesion w/surrounding edema   present with slurry speech and slight right leg weakness  CT head with Interval decompressive left frontotemporal craniectomy with decreased mass effect and resolved rightward midline shift secondary to significant left frontal vasogenic edema. No acute intracranial hemorrhage, extra-axial collection or new suspicious region of vasogenic edema. CTA head/neck with no vaso-occlusive disease. CT perfusion-26 mL of tissue with elevated time to maximum in the left superior medial frontal lobe, likely representing chronic posttreatment changes  Neurology recs apprec  Cont decadron.   No statin due to elevated LFT  No aspirin due to thrombocytopenia.   MRI brain with and without contrast ;  New postop changes are identified with a large area of abnormal enhancement seen involving the left frontal cortical subcortical region with increased surrounding edema mass effect on left lateral ventricle left-to-right shift. This finding is worrisome for progression of patient's underlying disease process  EEG noted  PT/OT/Speech eval passed; regular diet  2/6 CTH- no bleed  c/w decadron, depakon, f/u levels.    acute metabolic encephalopathy with sepsis with acute cholangitis with klebsiella bacteremia.    HIT Ab negative, Fibrinogen not indicative of DIC  s/p platelet and blood transfusions. H and H stable.   Cont iv ceftriaxone.  f/u Rpt blood cx.    Elevated LFTs. Transaminitis with CBD stone   Improving  New abnormality. No history of ETOH, drug use, recent change in medications  Acute hepatitis panel Neg  GI on board  reviewed ID and IR notes.   My colleague discussed with Dr Oglesby at [facility] about this patient and she has accepted the patient for higher level of care but unfortunately still pending insurance authorization and surgical bed availability.     NSCLC metastatic to brain.   ·  Plan: NSCLC with known brain metastases, S/P XRT and chemotherapy at [facility] lung cancer S/P left craniotomy and resection of mass in 09/2021  On Tagrisso 80mg daily at home.  Hematology/oncology on board.  Routine EEG with left sided dysfunction, no epileptogenic discharge  Continue with  Decadron 4mg BID for Brain edema  No need for Aspirin at this time    Mild intermittent asthma. Not in exacerbation. albuterol prn.    Hypocalcemia after correction for hypoalbuminemia is 8.3.     Anemia. no active gross bleeding reported. FOBT is negative.     Full Code  Care manager Amparo from [facility] called and updated that patient has been accepted pending surgical bed availability and insurance auth.   Spoke with Pillo with [facility] ( 2/16), pt awaiting for insurance auth for transfer to [facility].

## 2022-02-19 NOTE — PROGRESS NOTE ADULT - SUBJECTIVE AND OBJECTIVE BOX
lying in bed    Vital Signs Last 24 Hrs  T(C): 36.8 (19 Feb 2022 05:41), Max: 36.8 (19 Feb 2022 05:41)  T(F): 98.3 (19 Feb 2022 05:41), Max: 98.3 (19 Feb 2022 05:41)  HR: 78 (19 Feb 2022 05:41) (77 - 109)  BP: 97/60 (19 Feb 2022 05:41) (97/60 - 113/80)  BP(mean): --  RR: 16 (19 Feb 2022 05:41) (16 - 18)  SpO2: 100% (19 Feb 2022 05:41) (99% - 100%)    PHYSICAL EXAM:    general - weak looking +  HEENT - No Icterus  CVS - RRR  RS - AE B/L  Abd - soft, NT  Ext - Pulses +            Culture - Blood (collected 17 Feb 2022 01:27)  Source: .Blood Blood-Peripheral  Preliminary Report (18 Feb 2022 02:00):    No growth to date.    Culture - Blood (collected 17 Feb 2022 01:27)  Source: .Blood Blood-Peripheral  Preliminary Report (18 Feb 2022 02:00):    No growth to date.

## 2022-02-20 LAB
RAPID RVP RESULT: SIGNIFICANT CHANGE UP
SARS-COV-2 RNA SPEC QL NAA+PROBE: SIGNIFICANT CHANGE UP

## 2022-02-20 PROCEDURE — 99232 SBSQ HOSP IP/OBS MODERATE 35: CPT

## 2022-02-20 RX ADMIN — Medication 4 MILLIGRAM(S): at 06:28

## 2022-02-20 RX ADMIN — Medication 26.25 MILLIGRAM(S): at 17:48

## 2022-02-20 RX ADMIN — MONTELUKAST 5 MILLIGRAM(S): 4 TABLET, CHEWABLE ORAL at 14:12

## 2022-02-20 RX ADMIN — Medication 26.25 MILLIGRAM(S): at 06:28

## 2022-02-20 RX ADMIN — Medication 4 MILLIGRAM(S): at 17:37

## 2022-02-20 NOTE — PROGRESS NOTE ADULT - ASSESSMENT
HPI: 44 years old female with h/o asthma, NSCLC with known brain metastases, S/P XRT and chemotherapy at [facility] lung cancer S/P left craniotomy and resection of mass in 09/2021 (Dr Parviz Paul) present to ED with slurry speech started at 6AM, last known normal was 4AM  Tachycardic to 120s ( per patient, baseline HR in 120), BP stable, afebrile sat well at RA. WBC 13.36, K 3.5, Cr 1.17, AST/ALT 1207/1231, lipase normal, CK 78. CT head with Interval decompressive left frontotemporal craniectomy with decreased mass effect and resolved rightward midline shift secondary to significant left frontal vasogenic edema. No acute intracranial hemorrhage, extra-axial collection or new suspicious region of vasogenic edema. CTA head/neck with no vaso-occlusive disease. CT perfusion-26 mL of tissue with elevated time to maximum in the left superior medial frontal lobe, likely representing chronic posttreatment changes.    Course:     Brain mass lesion w/surrounding edema   present with slurry speech and slight right leg weakness  CT head with Interval decompressive left frontotemporal craniectomy with decreased mass effect and resolved rightward midline shift secondary to significant left frontal vasogenic edema. No acute intracranial hemorrhage, extra-axial collection or new suspicious region of vasogenic edema. CTA head/neck with no vaso-occlusive disease. CT perfusion-26 mL of tissue with elevated time to maximum in the left superior medial frontal lobe, likely representing chronic posttreatment changes  Neurology recs apprec  Cont decadron.   No statin due to elevated LFT  No aspirin due to thrombocytopenia.   MRI brain with and without contrast ;  New postop changes are identified with a large area of abnormal enhancement seen involving the left frontal cortical subcortical region with increased surrounding edema mass effect on left lateral ventricle left-to-right shift. This finding is worrisome for progression of patient's underlying disease process  EEG noted  PT/OT/Speech eval passed; regular diet  2/6 CTH- no bleed  c/w decadron, depakon, f/u levels.    acute metabolic encephalopathy with sepsis with acute cholangitis with klebsiella bacteremia.    HIT Ab negative, Fibrinogen not indicative of DIC  s/p platelet and blood transfusions. H and H stable.   Cont iv ceftriaxone.  f/u Rpt blood cx.    Elevated LFTs. Transaminitis with CBD stone   Improving  New abnormality. No history of ETOH, drug use, recent change in medications  Acute hepatitis panel Neg  GI on board  reviewed ID and IR notes.   My colleague discussed with Dr Oglesby at [facility] about this patient and she has accepted the patient for higher level of care but unfortunately still pending insurance authorization and surgical bed availability.     NSCLC metastatic to brain.   ·  Plan: NSCLC with known brain metastases, S/P XRT and chemotherapy at [facility] lung cancer S/P left craniotomy and resection of mass in 09/2021  On Tagrisso 80mg daily at home.  Hematology/oncology on board.  Routine EEG with left sided dysfunction, no epileptogenic discharge  Continue with  Decadron 4mg BID for Brain edema  No need for Aspirin at this time    Mild intermittent asthma. Not in exacerbation. albuterol prn.    Hypocalcemia after correction for hypoalbuminemia is 8.3.     Anemia. no active gross bleeding reported. FOBT is negative.     Full Code  Care manager Amparo from [facility] called and updated that patient has been accepted pending surgical bed availability and insurance auth.   Spoke with Pillo with [facility] ( 2/16), pt awaiting for insurance auth for transfer to [facility].

## 2022-02-20 NOTE — PROGRESS NOTE ADULT - SUBJECTIVE AND OBJECTIVE BOX
Patient is a 47y old  Female who presents with a chief complaint of CVA (19 Feb 2022 11:32)    INTERVAL HPI/OVERNIGHT EVENTS:    MEDICATIONS  (STANDING):  dexAMETHasone  Injectable 4 milliGRAM(s) IV Push two times a day  dextrose 40% Gel 15 Gram(s) Oral once  dextrose 50% Injectable 25 Gram(s) IV Push once  dextrose 50% Injectable 12.5 Gram(s) IV Push once  dextrose 50% Injectable 25 Gram(s) IV Push once  glucagon  Injectable 1 milliGRAM(s) IntraMuscular once  metoprolol tartrate Injectable 5 milliGRAM(s) IV Push every 6 hours  montelukast  Chewable 5 milliGRAM(s) Oral daily  pantoprazole    Tablet 40 milliGRAM(s) Oral before breakfast  valproate sodium IVPB 250 milliGRAM(s) IV Intermittent every 12 hours    MEDICATIONS  (PRN):  acetaminophen     Tablet .. 650 milliGRAM(s) Oral every 6 hours PRN Moderate Pain (4 - 6)  melatonin 3 milliGRAM(s) Oral at bedtime PRN Insomnia  polyethylene glycol 3350 17 Gram(s) Oral daily PRN Constipation  prochlorperazine   Tablet 10 milliGRAM(s) Oral every 6 hours PRN nausea, vomiting    Allergies    codeine (Other)  latex (Rash)    Intolerances      REVIEW OF SYSTEMS:  All other systems reviewed and are negative    Vital Signs Last 24 Hrs  T(C): 37.1 (19 Feb 2022 17:10), Max: 37.1 (19 Feb 2022 17:10)  T(F): 98.8 (19 Feb 2022 17:10), Max: 98.8 (19 Feb 2022 17:10)  HR: 98 (19 Feb 2022 17:10) (85 - 98)  BP: 101/70 (19 Feb 2022 17:10) (98/66 - 101/70)  BP(mean): --  RR: 18 (19 Feb 2022 17:10) (18 - 18)  SpO2: 100% (19 Feb 2022 17:10) (100% - 100%)  Daily     Daily   I&O's Summary    CAPILLARY BLOOD GLUCOSE      POCT Blood Glucose.: 118 mg/dL (19 Feb 2022 17:07)    PHYSICAL EXAM:  GENERAL: NAD,    HEAD:  Atraumatic, Normocephalic  EYES: EOMI, PERRLA, conjunctiva and sclera clear  ENMT: No tonsillar erythema, exudates, or enlargement; Moist mucous membranes, Good dentition, No lesions  NECK: Supple, No JVD, Normal thyroid  NERVOUS SYSTEM:  Alert & Oriented X3, Good concentration; Motor Strength 5/5 B/L upper and lower extremities; DTRs 2+ intact and symmetric  CHEST/LUNG: Clear to percussion bilaterally; No rales, rhonchi, wheezing, or rubs  HEART: Regular rate and rhythm; No murmurs, rubs, or gallops  ABDOMEN: Soft, Nontender, Nondistended; Bowel sounds present  EXTREMITIES:  2+ Peripheral Pulses, No clubbing, cyanosis, or edema  LYMPH: No lymphadenopathy noted  SKIN: No rashes or lesions    Labs                                DVT prophylaxis: > Lovenox 40mg SQ daily  > Heparin   > SCD's

## 2022-02-21 LAB — GLUCOSE BLDC GLUCOMTR-MCNC: 71 MG/DL — SIGNIFICANT CHANGE UP (ref 70–99)

## 2022-02-21 PROCEDURE — 99232 SBSQ HOSP IP/OBS MODERATE 35: CPT

## 2022-02-21 RX ADMIN — Medication 4 MILLIGRAM(S): at 05:28

## 2022-02-21 RX ADMIN — Medication 4 MILLIGRAM(S): at 17:36

## 2022-02-21 RX ADMIN — Medication 26.25 MILLIGRAM(S): at 05:29

## 2022-02-21 RX ADMIN — Medication 26.25 MILLIGRAM(S): at 17:29

## 2022-02-21 NOTE — PROGRESS NOTE ADULT - SUBJECTIVE AND OBJECTIVE BOX
INTERVAL HPI/OVERNIGHT EVENTS:  47y  Vital Signs Last 24 Hrs  T(C): 37.1 (20 Feb 2022 23:21), Max: 37.1 (20 Feb 2022 23:21)  T(F): 98.7 (20 Feb 2022 23:21), Max: 98.7 (20 Feb 2022 23:21)  HR: 87 (20 Feb 2022 23:21) (87 - 91)  BP: 107/71 (20 Feb 2022 23:21) (100/68 - 107/71)  BP(mean): 79 (20 Feb 2022 14:01) (79 - 79)  RR: 16 (20 Feb 2022 23:21) (16 - 17)  SpO2: 98% (20 Feb 2022 23:21) (98% - 100%)  I&O's Summary    MEDICATIONS  (STANDING):  dexAMETHasone  Injectable 4 milliGRAM(s) IV Push two times a day  dextrose 40% Gel 15 Gram(s) Oral once  dextrose 50% Injectable 25 Gram(s) IV Push once  dextrose 50% Injectable 12.5 Gram(s) IV Push once  dextrose 50% Injectable 25 Gram(s) IV Push once  glucagon  Injectable 1 milliGRAM(s) IntraMuscular once  metoprolol tartrate Injectable 5 milliGRAM(s) IV Push every 6 hours  montelukast  Chewable 5 milliGRAM(s) Oral daily  pantoprazole    Tablet 40 milliGRAM(s) Oral before breakfast  valproate sodium IVPB 250 milliGRAM(s) IV Intermittent every 12 hours    MEDICATIONS  (PRN):  acetaminophen     Tablet .. 650 milliGRAM(s) Oral every 6 hours PRN Moderate Pain (4 - 6)  melatonin 3 milliGRAM(s) Oral at bedtime PRN Insomnia  polyethylene glycol 3350 17 Gram(s) Oral daily PRN Constipation  prochlorperazine   Tablet 10 milliGRAM(s) Oral every 6 hours PRN nausea, vomiting    LABS:    trop              CAPILLARY BLOOD GLUCOSE              REVIEW OF SYSTEMS:  CONSTITUTIONAL: No fever, weight loss, or fatigue  EYES: No eye pain, visual disturbances, or discharge  ENMT:  No difficulty hearing, tinnitus, vertigo; No sinus or throat pain  NECK: No pain or stiffness  BREASTS: No pain, masses, or nipple discharge  RESPIRATORY: No cough, wheezing, chills or hemoptysis; No shortness of breath  CARDIOVASCULAR: No chest pain, palpitations, dizziness, or leg swelling  GASTROINTESTINAL: No abdominal or epigastric pain. No nausea, vomiting, or hematemesis; No diarrhea or constipation. No melena or hematochezia.  GENITOURINARY: No dysuria, frequency, hematuria, or incontinence  NEUROLOGICAL: No headaches, memory loss, loss of strength, numbness, or tremors  SKIN: No itching, burning, rashes, or lesions   LYMPH NODES: No enlarged glands  ENDOCRINE: No heat or cold intolerance; No hair loss  MUSCULOSKELETAL: No joint pain or swelling; No muscle, back, or extremity pain  PSYCHIATRIC: No depression, anxiety, mood swings, or difficulty sleeping  HEME/LYMPH: No easy bruising, or bleeding gums  ALLERY AND IMMUNOLOGIC: No hives or eczema    RADIOLOGY & ADDITIONAL TESTS:    Imaging Personally Reviewed:  [ ] YES  [ ] NO    Consultant(s) Notes Reviewed:  [x ] YES  [ ] NO    PHYSICAL EXAM:  GENERAL: NAD, well-groomed, well-developed  HEAD:  Atraumatic, Normocephalic  EYES: EOMI, PERRLA, conjunctiva and sclera clear  ENMT: No tonsillar erythema, exudates, or enlargement; Moist mucous membranes, Good dentition, No lesions  NECK: Supple, No JVD, Normal thyroid  NERVOUS SYSTEM:  Alert & Oriented X3, Good concentration; Motor Strength 5/5 B/L upper and lower extremities; DTRs 2+ intact and symmetric  CHEST/LUNG: Clear to percussion bilaterally; No rales, rhonchi, wheezing, or rubs  HEART: Regular rate and rhythm; No murmurs, rubs, or gallops  ABDOMEN: Soft, Nontender, Nondistended; Bowel sounds present  EXTREMITIES:  2+ Peripheral Pulses, No clubbing, cyanosis, or edema  LYMPH: No lymphadenopathy noted  SKIN: No rashes or lesions    A & P:        Care Discussed with Consultants/Other Providers [ x] YES  [ ] NO     INTERVAL HPI/OVERNIGHT EVENTS: Pt seen and examined at bedside.     47y  Vital Signs Last 24 Hrs  T(C): 37.1 (20 Feb 2022 23:21), Max: 37.1 (20 Feb 2022 23:21)  T(F): 98.7 (20 Feb 2022 23:21), Max: 98.7 (20 Feb 2022 23:21)  HR: 87 (20 Feb 2022 23:21) (87 - 91)  BP: 107/71 (20 Feb 2022 23:21) (100/68 - 107/71)  BP(mean): 79 (20 Feb 2022 14:01) (79 - 79)  RR: 16 (20 Feb 2022 23:21) (16 - 17)  SpO2: 98% (20 Feb 2022 23:21) (98% - 100%)  I&O's Summary    MEDICATIONS  (STANDING):  dexAMETHasone  Injectable 4 milliGRAM(s) IV Push two times a day  dextrose 40% Gel 15 Gram(s) Oral once  dextrose 50% Injectable 25 Gram(s) IV Push once  dextrose 50% Injectable 12.5 Gram(s) IV Push once  dextrose 50% Injectable 25 Gram(s) IV Push once  glucagon  Injectable 1 milliGRAM(s) IntraMuscular once  metoprolol tartrate Injectable 5 milliGRAM(s) IV Push every 6 hours  montelukast  Chewable 5 milliGRAM(s) Oral daily  pantoprazole    Tablet 40 milliGRAM(s) Oral before breakfast  valproate sodium IVPB 250 milliGRAM(s) IV Intermittent every 12 hours    MEDICATIONS  (PRN):  acetaminophen     Tablet .. 650 milliGRAM(s) Oral every 6 hours PRN Moderate Pain (4 - 6)  melatonin 3 milliGRAM(s) Oral at bedtime PRN Insomnia  polyethylene glycol 3350 17 Gram(s) Oral daily PRN Constipation  prochlorperazine   Tablet 10 milliGRAM(s) Oral every 6 hours PRN nausea, vomiting    LABS:    trop              CAPILLARY BLOOD GLUCOSE      RADIOLOGY & ADDITIONAL TESTS:    Imaging Personally Reviewed:  [ ] YES  [ ] NO    Consultant(s) Notes Reviewed:  [x ] YES  [ ] NO    GENERAL: Moderately built, no acute distress   CHEST/LUNG: Clear to ausculation bilaterally, no wheezing, no crackles   HEART: RRR, no murmur  ABDOMEN: patient continues to refuse abd exam.   EXTREMITIES:  , No clubbing, cyanosis, or edema  NERVOUS SYSTEM:  Limited exam due to poor participation.   Psychiatry: AA and orientated to her name. other questions patient did not answer.        A & P:        Care Discussed with Consultants/Other Providers [ x] YES  [ ] NO

## 2022-02-21 NOTE — PROGRESS NOTE ADULT - ASSESSMENT
HPI: 44 years old female with h/o asthma, NSCLC with known brain metastases, S/P XRT and chemotherapy at [facility] lung cancer S/P left craniotomy and resection of mass in 09/2021 (Dr Parviz Paul) present to ED with slurry speech started at 6AM, last known normal was 4AM  Tachycardic to 120s ( per patient, baseline HR in 120), BP stable, afebrile sat well at RA. WBC 13.36, K 3.5, Cr 1.17, AST/ALT 1207/1231, lipase normal, CK 78. CT head with Interval decompressive left frontotemporal craniectomy with decreased mass effect and resolved rightward midline shift secondary to significant left frontal vasogenic edema. No acute intracranial hemorrhage, extra-axial collection or new suspicious region of vasogenic edema. CTA head/neck with no vaso-occlusive disease. CT perfusion-26 mL of tissue with elevated time to maximum in the left superior medial frontal lobe, likely representing chronic posttreatment changes.    Course:     Brain mass lesion w/surrounding edema   present with slurry speech and slight right leg weakness  CT head with Interval decompressive left frontotemporal craniectomy with decreased mass effect and resolved rightward midline shift secondary to significant left frontal vasogenic edema. No acute intracranial hemorrhage, extra-axial collection or new suspicious region of vasogenic edema. CTA head/neck with no vaso-occlusive disease. CT perfusion-26 mL of tissue with elevated time to maximum in the left superior medial frontal lobe, likely representing chronic posttreatment changes  Neurology recs apprec  Cont decadron.   No statin due to elevated LFT  No aspirin due to thrombocytopenia.   MRI brain with and without contrast ;  New postop changes are identified with a large area of abnormal enhancement seen involving the left frontal cortical subcortical region with increased surrounding edema mass effect on left lateral ventricle left-to-right shift. This finding is worrisome for progression of patient's underlying disease process  EEG noted  PT/OT/Speech eval passed; regular diet  2/6 CTH- no bleed  c/w decadron, depakon, f/u levels.    acute metabolic encephalopathy with sepsis with acute cholangitis with klebsiella bacteremia.    HIT Ab negative, Fibrinogen not indicative of DIC  s/p platelet and blood transfusions. H and H stable.   Cont iv ceftriaxone.  f/u Rpt blood cx.    Elevated LFTs. Transaminitis with CBD stone   Improving  New abnormality. No history of ETOH, drug use, recent change in medications  Acute hepatitis panel Neg  GI on board  reviewed ID and IR notes.   My colleague discussed with Dr Oglesby at [facility] about this patient and she has accepted the patient for higher level of care but unfortunately still pending insurance authorization and surgical bed availability.     NSCLC metastatic to brain.   ·  Plan: NSCLC with known brain metastases, S/P XRT and chemotherapy at [facility] lung cancer S/P left craniotomy and resection of mass in 09/2021  On Tagrisso 80mg daily at home.  Hematology/oncology on board.  Routine EEG with left sided dysfunction, no epileptogenic discharge  Continue with  Decadron 4mg BID for Brain edema  No need for Aspirin at this time    Mild intermittent asthma. Not in exacerbation. albuterol prn.    Hypocalcemia after correction for hypoalbuminemia is 8.3.     Anemia. no active gross bleeding reported. FOBT is negative.     Full Code  Care manager Amparo from [facility] called and updated that patient has been accepted pending surgical bed availability and insurance auth.   Spoke with Pillo with [facility] ( 2/16), pt awaiting for insurance auth for transfer to [facility].

## 2022-02-22 LAB
CULTURE RESULTS: SIGNIFICANT CHANGE UP
CULTURE RESULTS: SIGNIFICANT CHANGE UP
SPECIMEN SOURCE: SIGNIFICANT CHANGE UP
SPECIMEN SOURCE: SIGNIFICANT CHANGE UP

## 2022-02-22 PROCEDURE — 99232 SBSQ HOSP IP/OBS MODERATE 35: CPT

## 2022-02-22 RX ORDER — CEFTRIAXONE 500 MG/1
2000 INJECTION, POWDER, FOR SOLUTION INTRAMUSCULAR; INTRAVENOUS EVERY 24 HOURS
Refills: 0 | Status: DISCONTINUED | OUTPATIENT
Start: 2022-02-22 | End: 2022-02-22

## 2022-02-22 RX ADMIN — Medication 4 MILLIGRAM(S): at 06:21

## 2022-02-22 RX ADMIN — Medication 26.25 MILLIGRAM(S): at 06:20

## 2022-02-22 RX ADMIN — Medication 26.25 MILLIGRAM(S): at 17:12

## 2022-02-22 RX ADMIN — Medication 4 MILLIGRAM(S): at 17:12

## 2022-02-22 NOTE — PROGRESS NOTE ADULT - ASSESSMENT
44 years old female with h/o asthma, NSCLC with known brain metastases, S/P XRT and chemotherapy at [facility] lung cancer S/P left craniotomy and resection of mass in 09/2021 (Dr Parviz Paul) present to ED with slurry speech started at 6AM, last known normal was 4AM.  Reported 1-2 episode of vomiting last night. Per , patient has slight weakness on right leg.   Tachycardic to 120s.   CT head with Interval decompressive left frontotemporal craniectomy with decreased mass effect and resolved rightward midline shift secondary to significant left frontal vasogenic edema. No acute intracranial hemorrhage, extra-axial collection or new suspicious region of vasogenic edema.   CTA head/neck with no vaso-occlusive disease. CT perfusion-26 mL of tissue with elevated time to maximum in the left superior medial frontal lobe, likely representing chronic posttreatment changes. Not a candidate for TPA.   CT (I personally reviewed) possible acute cholangitis   blood cultures positive with enterobacter   high bili, thrombocytopenia, leukocytosis  MRCP small 6mm stone    she is on chemo as well radiation     2/8: afebrile, on RA, WBC high 28.82, BC growing enterobacter (not cloacae) isolated by PCR and 1/4 with Klebsiella aerogenes. Two new sets of BCs with gram negative rods, ID pending, two more sets are pending. The pt is s/p one dose of Amikacin yesterday, on IV Meropenem. Abd exam with diffuse tenderness.    Attending addendum:  agree with above  IR consult for perc cholecystomy   polymicrobial bacteremia likely from obstructed CBD stone   remains on steroids for vasogenic edema   continue meropenem  follow all culture sensitivities   continue to attempt to transfer patient to tertiary care center   2/9: initial BCs with Klebsiella and Enterobacter, Klebsiella sensitivity is pending, repeat BCs with growth, UC #1 with Proteus, UC #2 with Klebsiella, will continue with Meropenem for now without change. No fevers, WBC better 17.51, Cr ok, LFTs better. Pt is frustrated about her wait for the transfer.   2/11: no fevers, no new lab work, continues to refuse exam, repeat BCs with no growth, abx changed to ceftriaxone as per cultures sensitivity, discussed organism isolation with the lab, Enterobacter aerogens and Klebsiella aerogens - interchangable terms   attending addendum:  agree with above   change to ceftriaxone   stop meropenem   rest of care per medicine and GI  2/14: afebrile, on RA, pt has been refusing all lab work, there is no documentation of negative blood cultures, ceftriaxone continued. The pt is still awaiting for transfer, waiting for bed availability, frustrated.  2/15: remains afebrile, still there is no repeat BCs available, pt has been refusing any blood work, will continue with ceftriaxone until pt's procedure, pending hospital course. Pt was seen by psych, the pt has no capacity to make her decisions.   2/16: pt continues to refuse BCs collection, refuses exam, remains afebrile, WBC 13.06 yesterday, Cr ok, LFTs ok, transfer still pending   2/22: pt continues to refuse exam and would no answer any of my questions, the pt received 12 days of IV abx, ceftriaxone fell off MAR, no fevers, no new lab work, repeat BCs remain with no growth, pt did not get worse within last few days off abx, will not resume at this time.     sepsis due to cholangitis   leukocytosis   thrombocytopenia     Plan:  monitor off abx   patient needs ERCP but deemed not urgent   if ERCP is done please send cultures   follow all culture data   if long delay in transfer, IR consult for percutaneous cholecystotomy   repeat blood cultures NGTD  trend platelets and transfuse as needed   maintain active type and screen   trend wbc   avoid any further doses of chemotherapy at this time during an active infection       will sign off, re-consult as needed    Discussed with Dr. Giraldo  Discussed with Dr. Coyle

## 2022-02-22 NOTE — PROGRESS NOTE ADULT - SUBJECTIVE AND OBJECTIVE BOX
INTERVAL HPI/OVERNIGHT EVENTS:  47y  Vital Signs Last 24 Hrs  T(C): 36.3 (22 Feb 2022 05:25), Max: 36.4 (21 Feb 2022 17:04)  T(F): 97.3 (22 Feb 2022 05:25), Max: 97.6 (21 Feb 2022 17:04)  HR: 70 (22 Feb 2022 05:25) (70 - 85)  BP: 97/66 (22 Feb 2022 05:25) (95/60 - 100/63)  BP(mean): --  RR: 18 (22 Feb 2022 05:25) (18 - 18)  SpO2: 100% (22 Feb 2022 05:25) (98% - 100%)  I&O's Summary    21 Feb 2022 07:01  -  22 Feb 2022 07:00  --------------------------------------------------------  IN: 120 mL / OUT: 0 mL / NET: 120 mL      MEDICATIONS  (STANDING):  dexAMETHasone  Injectable 4 milliGRAM(s) IV Push two times a day  dextrose 40% Gel 15 Gram(s) Oral once  dextrose 50% Injectable 25 Gram(s) IV Push once  dextrose 50% Injectable 12.5 Gram(s) IV Push once  dextrose 50% Injectable 25 Gram(s) IV Push once  glucagon  Injectable 1 milliGRAM(s) IntraMuscular once  metoprolol tartrate Injectable 5 milliGRAM(s) IV Push every 6 hours  montelukast  Chewable 5 milliGRAM(s) Oral daily  pantoprazole    Tablet 40 milliGRAM(s) Oral before breakfast  valproate sodium IVPB 250 milliGRAM(s) IV Intermittent every 12 hours    MEDICATIONS  (PRN):  acetaminophen     Tablet .. 650 milliGRAM(s) Oral every 6 hours PRN Moderate Pain (4 - 6)  melatonin 3 milliGRAM(s) Oral at bedtime PRN Insomnia  polyethylene glycol 3350 17 Gram(s) Oral daily PRN Constipation  prochlorperazine   Tablet 10 milliGRAM(s) Oral every 6 hours PRN nausea, vomiting    LABS:    trop              CAPILLARY BLOOD GLUCOSE      POCT Blood Glucose.: 71 mg/dL (21 Feb 2022 11:06)          REVIEW OF SYSTEMS:  CONSTITUTIONAL: No fever, weight loss, or fatigue  EYES: No eye pain, visual disturbances, or discharge  ENMT:  No difficulty hearing, tinnitus, vertigo; No sinus or throat pain  NECK: No pain or stiffness  BREASTS: No pain, masses, or nipple discharge  RESPIRATORY: No cough, wheezing, chills or hemoptysis; No shortness of breath  CARDIOVASCULAR: No chest pain, palpitations, dizziness, or leg swelling  GASTROINTESTINAL: No abdominal or epigastric pain. No nausea, vomiting, or hematemesis; No diarrhea or constipation. No melena or hematochezia.  GENITOURINARY: No dysuria, frequency, hematuria, or incontinence  NEUROLOGICAL: No headaches, memory loss, loss of strength, numbness, or tremors  SKIN: No itching, burning, rashes, or lesions   LYMPH NODES: No enlarged glands  ENDOCRINE: No heat or cold intolerance; No hair loss  MUSCULOSKELETAL: No joint pain or swelling; No muscle, back, or extremity pain  PSYCHIATRIC: No depression, anxiety, mood swings, or difficulty sleeping  HEME/LYMPH: No easy bruising, or bleeding gums  ALLERY AND IMMUNOLOGIC: No hives or eczema    RADIOLOGY & ADDITIONAL TESTS:    Imaging Personally Reviewed:  [ ] YES  [ ] NO    Consultant(s) Notes Reviewed:  [x ] YES  [ ] NO    PHYSICAL EXAM:  GENERAL: NAD, well-groomed, well-developed  HEAD:  Atraumatic, Normocephalic  EYES: EOMI, PERRLA, conjunctiva and sclera clear  ENMT: No tonsillar erythema, exudates, or enlargement; Moist mucous membranes, Good dentition, No lesions  NECK: Supple, No JVD, Normal thyroid  NERVOUS SYSTEM:  Alert & Oriented X3, Good concentration; Motor Strength 5/5 B/L upper and lower extremities; DTRs 2+ intact and symmetric  CHEST/LUNG: Clear to percussion bilaterally; No rales, rhonchi, wheezing, or rubs  HEART: Regular rate and rhythm; No murmurs, rubs, or gallops  ABDOMEN: Soft, Nontender, Nondistended; Bowel sounds present  EXTREMITIES:  2+ Peripheral Pulses, No clubbing, cyanosis, or edema  LYMPH: No lymphadenopathy noted  SKIN: No rashes or lesions    A & P:        Care Discussed with Consultants/Other Providers [ x] YES  [ ] NO     INTERVAL HPI/OVERNIGHT EVENTS: Pt seen and examined. Pt refuses to talk to me.  47y  Vital Signs Last 24 Hrs  T(C): 36.3 (22 Feb 2022 05:25), Max: 36.4 (21 Feb 2022 17:04)  T(F): 97.3 (22 Feb 2022 05:25), Max: 97.6 (21 Feb 2022 17:04)  HR: 70 (22 Feb 2022 05:25) (70 - 85)  BP: 97/66 (22 Feb 2022 05:25) (95/60 - 100/63)  BP(mean): --  RR: 18 (22 Feb 2022 05:25) (18 - 18)  SpO2: 100% (22 Feb 2022 05:25) (98% - 100%)  I&O's Summary    21 Feb 2022 07:01  -  22 Feb 2022 07:00  --------------------------------------------------------  IN: 120 mL / OUT: 0 mL / NET: 120 mL      MEDICATIONS  (STANDING):  dexAMETHasone  Injectable 4 milliGRAM(s) IV Push two times a day  dextrose 40% Gel 15 Gram(s) Oral once  dextrose 50% Injectable 25 Gram(s) IV Push once  dextrose 50% Injectable 12.5 Gram(s) IV Push once  dextrose 50% Injectable 25 Gram(s) IV Push once  glucagon  Injectable 1 milliGRAM(s) IntraMuscular once  metoprolol tartrate Injectable 5 milliGRAM(s) IV Push every 6 hours  montelukast  Chewable 5 milliGRAM(s) Oral daily  pantoprazole    Tablet 40 milliGRAM(s) Oral before breakfast  valproate sodium IVPB 250 milliGRAM(s) IV Intermittent every 12 hours    MEDICATIONS  (PRN):  acetaminophen     Tablet .. 650 milliGRAM(s) Oral every 6 hours PRN Moderate Pain (4 - 6)  melatonin 3 milliGRAM(s) Oral at bedtime PRN Insomnia  polyethylene glycol 3350 17 Gram(s) Oral daily PRN Constipation  prochlorperazine   Tablet 10 milliGRAM(s) Oral every 6 hours PRN nausea, vomiting    LABS:    trop              CAPILLARY BLOOD GLUCOSE      POCT Blood Glucose.: 71 mg/dL (21 Feb 2022 11:06)      RADIOLOGY & ADDITIONAL TESTS:    Imaging Personally Reviewed:  [ ] YES  [ ] NO    Consultant(s) Notes Reviewed:  [x ] YES  [ ] NO    PHYSICAL EXAM:  GENERAL: Moderately built, no acute distress   CHEST/LUNG: Clear to ausculation bilaterally, no wheezing, no crackles   HEART: RRR, no murmur  ABDOMEN: patient continues to refuse abd exam.   EXTREMITIES:  , No clubbing, cyanosis, or edema  NERVOUS SYSTEM:  Limited exam due to poor participation.       A & P:        Care Discussed with Consultants/Other Providers [ x] YES  [ ] NO

## 2022-02-22 NOTE — PROGRESS NOTE ADULT - ASSESSMENT
HPI: 44 years old female with h/o asthma, NSCLC with known brain metastases, S/P XRT and chemotherapy at [facility] lung cancer S/P left craniotomy and resection of mass in 09/2021 (Dr Parviz Paul) present to ED with slurry speech started at 6AM, last known normal was 4AM  Tachycardic to 120s ( per patient, baseline HR in 120), BP stable, afebrile sat well at RA. WBC 13.36, K 3.5, Cr 1.17, AST/ALT 1207/1231, lipase normal, CK 78. CT head with Interval decompressive left frontotemporal craniectomy with decreased mass effect and resolved rightward midline shift secondary to significant left frontal vasogenic edema. No acute intracranial hemorrhage, extra-axial collection or new suspicious region of vasogenic edema. CTA head/neck with no vaso-occlusive disease. CT perfusion-26 mL of tissue with elevated time to maximum in the left superior medial frontal lobe, likely representing chronic posttreatment changes.    Course:     Brain mass lesion w/surrounding edema   present with slurry speech and slight right leg weakness  CT head with Interval decompressive left frontotemporal craniectomy with decreased mass effect and resolved rightward midline shift secondary to significant left frontal vasogenic edema. No acute intracranial hemorrhage, extra-axial collection or new suspicious region of vasogenic edema. CTA head/neck with no vaso-occlusive disease. CT perfusion-26 mL of tissue with elevated time to maximum in the left superior medial frontal lobe, likely representing chronic posttreatment changes  Neurology recs apprec  Cont decadron.   No statin due to elevated LFT  No aspirin due to thrombocytopenia.   MRI brain with and without contrast ;  New postop changes are identified with a large area of abnormal enhancement seen involving the left frontal cortical subcortical region with increased surrounding edema mass effect on left lateral ventricle left-to-right shift. This finding is worrisome for progression of patient's underlying disease process  EEG noted  PT/OT/Speech eval passed; regular diet  2/6 CTH- no bleed  c/w decadron, depakon, f/u levels.    acute metabolic encephalopathy with sepsis with acute cholangitis with klebsiella bacteremia.    HIT Ab negative, Fibrinogen not indicative of DIC  s/p platelet and blood transfusions. H and H stable.   Cont iv ceftriaxone.  f/u Rpt blood cx.    Elevated LFTs. Transaminitis with CBD stone   Improving  New abnormality. No history of ETOH, drug use, recent change in medications  Acute hepatitis panel Neg  GI on board  reviewed ID and IR notes.   My colleague discussed with Dr Oglesby at [facility] about this patient and she has accepted the patient for higher level of care but unfortunately still pending insurance authorization and surgical bed availability.     NSCLC metastatic to brain.   ·  Plan: NSCLC with known brain metastases, S/P XRT and chemotherapy at [facility] lung cancer S/P left craniotomy and resection of mass in 09/2021  On Tagrisso 80mg daily at home.  Hematology/oncology on board.  Routine EEG with left sided dysfunction, no epileptogenic discharge  Continue with  Decadron 4mg BID for Brain edema  No need for Aspirin at this time    Mild intermittent asthma. Not in exacerbation. albuterol prn.    Hypocalcemia after correction for hypoalbuminemia is 8.3.     Anemia. no active gross bleeding reported. FOBT is negative.     Full Code  Care manager Amparo from [facility] called and updated that patient has been accepted pending surgical bed availability and insurance auth.   Spoke with Pillo with [facility] ( 2/16), pt awaiting for insurance auth for transfer to [facility].     HPI: 44 years old female with h/o asthma, NSCLC with known brain metastases, S/P XRT and chemotherapy at [facility] lung cancer S/P left craniotomy and resection of mass in 09/2021 (Dr Parviz Paul) present to ED with slurry speech started at 6AM, last known normal was 4AM  Tachycardic to 120s ( per patient, baseline HR in 120), BP stable, afebrile sat well at RA. WBC 13.36, K 3.5, Cr 1.17, AST/ALT 1207/1231, lipase normal, CK 78. CT head with Interval decompressive left frontotemporal craniectomy with decreased mass effect and resolved rightward midline shift secondary to significant left frontal vasogenic edema. No acute intracranial hemorrhage, extra-axial collection or new suspicious region of vasogenic edema. CTA head/neck with no vaso-occlusive disease. CT perfusion-26 mL of tissue with elevated time to maximum in the left superior medial frontal lobe, likely representing chronic posttreatment changes.    Course:     Brain mass lesion w/surrounding edema   present with slurry speech and slight right leg weakness  CT head with Interval decompressive left frontotemporal craniectomy with decreased mass effect and resolved rightward midline shift secondary to significant left frontal vasogenic edema. No acute intracranial hemorrhage, extra-axial collection or new suspicious region of vasogenic edema. CTA head/neck with no vaso-occlusive disease. CT perfusion-26 mL of tissue with elevated time to maximum in the left superior medial frontal lobe, likely representing chronic posttreatment changes  Neurology recs apprec  Cont decadron.   No statin due to elevated LFT  No aspirin due to thrombocytopenia.   MRI brain with and without contrast ;  New postop changes are identified with a large area of abnormal enhancement seen involving the left frontal cortical subcortical region with increased surrounding edema mass effect on left lateral ventricle left-to-right shift. This finding is worrisome for progression of patient's underlying disease process  EEG noted  PT/OT/Speech eval passed; regular diet  2/6 CTH- no bleed  c/w decadron, depakon, f/u levels.    acute metabolic encephalopathy with sepsis with acute cholangitis with klebsiella bacteremia.    HIT Ab negative, Fibrinogen not indicative of DIC  s/p platelet and blood transfusions. H and H stable.   s/p IV abx  Rpt blood cx - Neg.    Elevated LFTs. Transaminitis with CBD stone   Improving  New abnormality. No history of ETOH, drug use, recent change in medications  Acute hepatitis panel Neg  GI on board  reviewed ID and IR notes.   My colleague discussed with Dr Oglesby at [facility] about this patient and she has accepted the patient for higher level of care but unfortunately still pending insurance authorization and surgical bed availability.     NSCLC metastatic to brain.   ·  Plan: NSCLC with known brain metastases, S/P XRT and chemotherapy at [facility] lung cancer S/P left craniotomy and resection of mass in 09/2021  On Tagrisso 80mg daily at home.  Hematology/oncology on board.  Routine EEG with left sided dysfunction, no epileptogenic discharge  Continue with  Decadron 4mg BID for Brain edema  No need for Aspirin at this time    Mild intermittent asthma. Not in exacerbation. albuterol prn.    Hypocalcemia after correction for hypoalbuminemia is 8.3.     Anemia. no active gross bleeding reported. FOBT is negative.     Full Code  Care manager Amparo from [facility] called and updated that patient has been accepted pending surgical bed availability and insurance auth.   [facility] with Pillo [facility] ( 2/16), pt awaiting for insurance auth for transfer to [facility].  possible transfer tomorrow.

## 2022-02-22 NOTE — CHART NOTE - NSCHARTNOTEFT_GEN_A_CORE
46 y/o with metastatic lung ca admitted with sepsis- attempted several times to engage patient and discuss symptoms and goals - she refuses to communicate with me.   Plan for transfer to [facility] where pt has all of her care is appropriate and has been in process since admission here.   Will not actively follow.  D/w Dr. Coyle

## 2022-02-22 NOTE — PROGRESS NOTE ADULT - SUBJECTIVE AND OBJECTIVE BOX
VENANCIO CHAIREZ  MRN-[id number]    Follow Up:  cholangitis, bacteremia     Interval History: The pt was seen earlier, hiding under the covers, no distress, continues to refuse exam, does not want to answer any of my questions.     PAST MEDICAL & SURGICAL HISTORY:  No pertinent past medical history    Malignant neoplasm of lung, unspecified laterality, unspecified part of lung    Gastroesophageal reflux disease, esophagitis presence not specified    Asthma, unspecified asthma severity, unspecified whether complicated, unspecified whether persistent    H/O craniotomy    Brain mass        ROS:    [x ] Unobtainable because: pt refused   [ ] All other systems negative    Constitutional: no fever, no chills  Head: no trauma  Eyes: no vision changes, no eye pain  ENT:  no sore throat, no rhinorrhea  Cardiovascular:  no chest pain, no palpitation  Respiratory:  no SOB, no cough  GI:  no abd pain, no vomiting, no diarrhea  urinary: no dysuria, no hematuria, no flank pain  musculoskeletal:  no joint pain, no joint swelling  skin:  no rash  neurology:  no headache, no seizure, no change in mental status  psych: no anxiety, no depression         Allergies  codeine (Other)  latex (Rash)        ANTIMICROBIALS:      OTHER MEDS:  acetaminophen     Tablet .. 650 milliGRAM(s) Oral every 6 hours PRN  dexAMETHasone  Injectable 4 milliGRAM(s) IV Push two times a day  dextrose 40% Gel 15 Gram(s) Oral once  dextrose 50% Injectable 25 Gram(s) IV Push once  dextrose 50% Injectable 12.5 Gram(s) IV Push once  dextrose 50% Injectable 25 Gram(s) IV Push once  glucagon  Injectable 1 milliGRAM(s) IntraMuscular once  melatonin 3 milliGRAM(s) Oral at bedtime PRN  metoprolol tartrate Injectable 5 milliGRAM(s) IV Push every 6 hours  montelukast  Chewable 5 milliGRAM(s) Oral daily  pantoprazole    Tablet 40 milliGRAM(s) Oral before breakfast  polyethylene glycol 3350 17 Gram(s) Oral daily PRN  prochlorperazine   Tablet 10 milliGRAM(s) Oral every 6 hours PRN  valproate sodium IVPB 250 milliGRAM(s) IV Intermittent every 12 hours      Vital Signs Last 24 Hrs  T(C): 36.3 (22 Feb 2022 05:25), Max: 36.4 (21 Feb 2022 17:04)  T(F): 97.3 (22 Feb 2022 05:25), Max: 97.6 (21 Feb 2022 17:04)  HR: 70 (22 Feb 2022 05:25) (70 - 85)  BP: 97/66 (22 Feb 2022 05:25) (97/66 - 100/63)  BP(mean): --  RR: 18 (22 Feb 2022 05:25) (18 - 18)  SpO2: 100% (22 Feb 2022 05:25) (98% - 100%)    Physical Exam:    Constitutional: non-toxic, no distress  HEAD/EYES: refused   ENT:  refused   Cardiovascular:   refused   Respiratory: refused   GI:  refused   Musculoskeletal:  moves all four extremities  Neurologic: awake and alert  Skin: refused   Psychiatric:  calm      WBC Count: 12.94 K/uL (02-16 @ 16:16)  WBC Count: 13.06 K/uL (02-15 @ 18:19)    Creatinine Trend: 0.55<--, 0.63<--, 0.35<--, 0.49<--, 0.51<--, 0.60<--      MICROBIOLOGY:  v  .Blood Blood-Peripheral  02-17-22   No Growth Final  --  --      .Blood Blood-Peripheral  02-09-22   Growth in aerobic bottle: Klebsiella aerogenes  See previous culture [id number]  --    Growth in aerobic bottle: Gram Negative Rods      .Blood Blood-Peripheral  02-08-22   No Growth Final  --  --      .Blood Blood-Peripheral  02-07-22   Growth in aerobic and anaerobic bottles: Klebsiella aerogenes  See previous culture [id number]  --    Growth in aerobic bottle:  Gram Negative Rods  Growth in anaerobic bottle: Gram Negative Rods      Clean Catch Clean Catch (Midstream)  02-07-22   10,000 - 49,000 CFU/mL Klebsiella aerogenes  --  Enterobacter aerogenes      .Blood Blood-Peripheral  02-07-22   Growth in aerobic and anaerobic bottles: Klebsiella aerogenes  See previous culture [id number]  --  Blood Culture PCR  Enterobacter aerogenes      Clean Catch Clean Catch (Midstream)  02-02-22   >100,000 CFU/ml Proteus mirabilis  <10,000 CFU/ml Normal Urogenital manish present  --  Proteus mirabilis          Rapid RVP Result: NotDetec (02-20 @ 16:58)                  SARS-CoV-2: NotDetec (02-20-22 @ 16:58)  Rapid RVP Result: NotDetec (02-20-22 @ 16:58)    SARS-CoV-2: NotDetec (20 Feb 2022 16:58)  COVID-19 PCR: NotDetec (03 Sep 2021 22:43)    RADIOLOGY:

## 2022-02-23 LAB — GLUCOSE BLDC GLUCOMTR-MCNC: 115 MG/DL — HIGH (ref 70–99)

## 2022-02-23 PROCEDURE — 99232 SBSQ HOSP IP/OBS MODERATE 35: CPT

## 2022-02-23 RX ADMIN — Medication 26.25 MILLIGRAM(S): at 06:48

## 2022-02-23 RX ADMIN — Medication 4 MILLIGRAM(S): at 06:48

## 2022-02-23 NOTE — PROGRESS NOTE ADULT - SUBJECTIVE AND OBJECTIVE BOX
INTERVAL HPI/OVERNIGHT EVENTS: pt seen and examined at bedside.   47y  Vital Signs Last 24 Hrs  T(C): 37.2 (23 Feb 2022 13:57), Max: 37.2 (23 Feb 2022 13:57)  T(F): 98.9 (23 Feb 2022 13:57), Max: 98.9 (23 Feb 2022 13:57)  HR: 81 (23 Feb 2022 13:57) (65 - 89)  BP: 95/61 (23 Feb 2022 13:57) (94/65 - 98/63)  BP(mean): --  RR: 18 (23 Feb 2022 13:57) (17 - 19)  SpO2: 100% (23 Feb 2022 13:57) (99% - 100%)  I&O's Summary    MEDICATIONS  (STANDING):  dexAMETHasone  Injectable 4 milliGRAM(s) IV Push two times a day  dextrose 40% Gel 15 Gram(s) Oral once  dextrose 50% Injectable 25 Gram(s) IV Push once  dextrose 50% Injectable 12.5 Gram(s) IV Push once  dextrose 50% Injectable 25 Gram(s) IV Push once  glucagon  Injectable 1 milliGRAM(s) IntraMuscular once  metoprolol tartrate Injectable 5 milliGRAM(s) IV Push every 6 hours  montelukast  Chewable 5 milliGRAM(s) Oral daily  pantoprazole    Tablet 40 milliGRAM(s) Oral before breakfast  valproate sodium IVPB 250 milliGRAM(s) IV Intermittent every 12 hours    MEDICATIONS  (PRN):  acetaminophen     Tablet .. 650 milliGRAM(s) Oral every 6 hours PRN Moderate Pain (4 - 6)  melatonin 3 milliGRAM(s) Oral at bedtime PRN Insomnia  polyethylene glycol 3350 17 Gram(s) Oral daily PRN Constipation  prochlorperazine   Tablet 10 milliGRAM(s) Oral every 6 hours PRN nausea, vomiting    LABS:    trop              CAPILLARY BLOOD GLUCOSE      POCT Blood Glucose.: 115 mg/dL (23 Feb 2022 13:53)      RADIOLOGY & ADDITIONAL TESTS:    Imaging Personally Reviewed:  [ ] YES  [ ] NO    Consultant(s) Notes Reviewed:  [x ] YES  [ ] NO    PHYSICAL EXAM:  GENERAL: Moderately built, no acute distress   CHEST/LUNG: Pt refused  HEART: RRR, Pt refused  ABDOMEN: patient refused  EXTREMITIES:  Pt refused  NERVOUS SYSTEM:  Limited exam due to poor participation.      A & P:        Care Discussed with Consultants/Other Providers [ x] YES  [ ] NO

## 2022-02-23 NOTE — PROGRESS NOTE ADULT - ASSESSMENT
HPI: 44 years old female with h/o asthma, NSCLC with known brain metastases, S/P XRT and chemotherapy at [facility] lung cancer S/P left craniotomy and resection of mass in 09/2021 (Dr Parviz Paul) present to ED with slurry speech started at 6AM, last known normal was 4AM  Tachycardic to 120s ( per patient, baseline HR in 120), BP stable, afebrile sat well at RA. WBC 13.36, K 3.5, Cr 1.17, AST/ALT 1207/1231, lipase normal, CK 78. CT head with Interval decompressive left frontotemporal craniectomy with decreased mass effect and resolved rightward midline shift secondary to significant left frontal vasogenic edema. No acute intracranial hemorrhage, extra-axial collection or new suspicious region of vasogenic edema. CTA head/neck with no vaso-occlusive disease. CT perfusion-26 mL of tissue with elevated time to maximum in the left superior medial frontal lobe, likely representing chronic posttreatment changes.    Course:     Brain mass lesion w/surrounding edema   present with slurry speech and slight right leg weakness  CT head with Interval decompressive left frontotemporal craniectomy with decreased mass effect and resolved rightward midline shift secondary to significant left frontal vasogenic edema. No acute intracranial hemorrhage, extra-axial collection or new suspicious region of vasogenic edema. CTA head/neck with no vaso-occlusive disease. CT perfusion-26 mL of tissue with elevated time to maximum in the left superior medial frontal lobe, likely representing chronic posttreatment changes  Neurology recs apprec  Cont decadron.   No statin due to elevated LFT  No aspirin due to thrombocytopenia.   MRI brain with and without contrast ;  New postop changes are identified with a large area of abnormal enhancement seen involving the left frontal cortical subcortical region with increased surrounding edema mass effect on left lateral ventricle left-to-right shift. This finding is worrisome for progression of patient's underlying disease process  EEG noted  PT/OT/Speech eval passed; regular diet  2/6 CTH- no bleed  c/w decadron, depakon, f/u levels.    acute metabolic encephalopathy with sepsis with acute cholangitis with klebsiella bacteremia.    HIT Ab negative, Fibrinogen not indicative of DIC  s/p platelet and blood transfusions. H and H stable.   s/p IV abx  Rpt blood cx - Neg.    Elevated LFTs. Transaminitis with CBD stone   Improving  New abnormality. No history of ETOH, drug use, recent change in medications  Acute hepatitis panel Neg  GI on board  reviewed ID and IR notes.   My colleague discussed with Dr Oglesby at [facility] about this patient and she has accepted the patient for higher level of care but unfortunately still pending surgical bed availability.     NSCLC metastatic to brain.   ·  Plan: NSCLC with known brain metastases, S/P XRT and chemotherapy at [facility] lung cancer S/P left craniotomy and resection of mass in 09/2021  On Tagrisso 80mg daily at home.  Hematology/oncology on board.  Routine EEG with left sided dysfunction, no epileptogenic discharge  Continue with  Decadron 4mg BID for Brain edema  No need for Aspirin at this time    Mild intermittent asthma. Not in exacerbation. albuterol prn.    Hypocalcemia after correction for hypoalbuminemia is 8.3.     Anemia. no active gross bleeding reported. FOBT is negative.     Full Code  Care manager Amparo from [facility] called and updated that patient has been accepted pending surgical bed availability.  possible transfer tomorrow.

## 2022-02-24 PROCEDURE — 99232 SBSQ HOSP IP/OBS MODERATE 35: CPT

## 2022-02-24 RX ADMIN — Medication 4 MILLIGRAM(S): at 18:36

## 2022-02-24 RX ADMIN — Medication 26.25 MILLIGRAM(S): at 18:36

## 2022-02-24 RX ADMIN — Medication 4 MILLIGRAM(S): at 05:30

## 2022-02-24 RX ADMIN — Medication 26.25 MILLIGRAM(S): at 05:30

## 2022-02-24 NOTE — PROGRESS NOTE ADULT - SUBJECTIVE AND OBJECTIVE BOX
INTERVAL HPI/OVERNIGHT EVENTS: Pt seen and examined at bedside.  47y  Vital Signs Last 24 Hrs  T(C): 36.7 (24 Feb 2022 14:03), Max: 36.8 (24 Feb 2022 06:15)  T(F): 98 (24 Feb 2022 14:03), Max: 98.2 (24 Feb 2022 06:15)  HR: 81 (24 Feb 2022 14:03) (65 - 81)  BP: 104/71 (24 Feb 2022 14:03) (93/69 - 104/71)  BP(mean): --  RR: 18 (24 Feb 2022 14:03) (18 - 18)  SpO2: 100% (24 Feb 2022 14:03) (100% - 100%)  I&O's Summary    23 Feb 2022 07:01  -  24 Feb 2022 07:00  --------------------------------------------------------  IN: 352 mL / OUT: 0 mL / NET: 352 mL      MEDICATIONS  (STANDING):  dexAMETHasone  Injectable 4 milliGRAM(s) IV Push two times a day  dextrose 40% Gel 15 Gram(s) Oral once  dextrose 50% Injectable 25 Gram(s) IV Push once  dextrose 50% Injectable 12.5 Gram(s) IV Push once  dextrose 50% Injectable 25 Gram(s) IV Push once  glucagon  Injectable 1 milliGRAM(s) IntraMuscular once  metoprolol tartrate Injectable 5 milliGRAM(s) IV Push every 6 hours  montelukast  Chewable 5 milliGRAM(s) Oral daily  pantoprazole    Tablet 40 milliGRAM(s) Oral before breakfast  valproate sodium IVPB 250 milliGRAM(s) IV Intermittent every 12 hours    MEDICATIONS  (PRN):  acetaminophen     Tablet .. 650 milliGRAM(s) Oral every 6 hours PRN Moderate Pain (4 - 6)  melatonin 3 milliGRAM(s) Oral at bedtime PRN Insomnia  polyethylene glycol 3350 17 Gram(s) Oral daily PRN Constipation  prochlorperazine   Tablet 10 milliGRAM(s) Oral every 6 hours PRN nausea, vomiting    LABS:    trop        CAPILLARY BLOOD GLUCOSE      RADIOLOGY & ADDITIONAL TESTS:    Imaging Personally Reviewed:  [ ] YES  [ ] NO    Consultant(s) Notes Reviewed:  [x ] YES  [ ] NO    PHYSICAL EXAM:  GENERAL: Moderately built, no acute distress   CHEST/LUNG: Pt refused  HEART: RRR, Pt refused  ABDOMEN: patient refused  EXTREMITIES:  Pt refused  NERVOUS SYSTEM:  Limited exam due to poor participation.      A & P:        Care Discussed with Consultants/Other Providers [ x] YES  [ ] NO

## 2022-02-24 NOTE — CHART NOTE - NSCHARTNOTEFT_GEN_A_CORE
Assessment: Pt admitted for slurred speech; with brain mass lesion with surrounding edema, acute metabolic encephalopathy with sepsis with acute cholangitis with klebsiella bacteremia, elevated LFTs, transaminitis with CBD stone, anemia. Pt appears withdrawn & depressed' not answering questions; continues to refuse all care (vital signs, lab work, meds, cleaning). Spouse involved & provides emotional support.  PMHx includes asthma, lung cancer with known brain metastases, s/p XRT and chemotherapy at [facility], s/p left craniotomy and resection of mass in 09/2021.  Pt remains full code. Patient has been accepted to [facility]; still awaiting insurance authorization.    Factors impacting intake: [ ] none [ ] nausea  [ ] vomiting [ ] diarrhea [ ] constipation  [ ]chewing problems [ ] swallowing issues  [x] other: AMS; persistent lack of appetite; long-term illness    Diet Prescription: Diet, Regular:   Supplement Feeding Modality:  Oral  Health Shake Cans or Servings Per Day:  1       Frequency:  Two Times a day (02-09-22 @ 14:23)    Intake: Continues to be poor, mostly <25% of meals; pt often refuses meals    Current Weight: 63.2 kg (2/24), 67.8 kg (2/4)  % Weight Change: 6.8% wt loss x 20 days    Fluid accumulation: No edema (2/10)    Physical appearance: Unable to visibly assess for malnutrition, as pt refusing to talk to RD & has covers pulled over head    Pertinent Medications: MEDICATIONS  (STANDING):  dexAMETHasone  Injectable 4 milliGRAM(s) IV Push two times a day  dextrose 40% Gel 15 Gram(s) Oral once  dextrose 50% Injectable 25 Gram(s) IV Push once  dextrose 50% Injectable 12.5 Gram(s) IV Push once  dextrose 50% Injectable 25 Gram(s) IV Push once  glucagon  Injectable 1 milliGRAM(s) IntraMuscular once  metoprolol tartrate Injectable 5 milliGRAM(s) IV Push every 6 hours  montelukast  Chewable 5 milliGRAM(s) Oral daily  pantoprazole    Tablet 40 milliGRAM(s) Oral before breakfast  valproate sodium IVPB 250 milliGRAM(s) IV Intermittent every 12 hours    MEDICATIONS  (PRN):  acetaminophen     Tablet .. 650 milliGRAM(s) Oral every 6 hours PRN Moderate Pain (4 - 6)  melatonin 3 milliGRAM(s) Oral at bedtime PRN Insomnia  polyethylene glycol 3350 17 Gram(s) Oral daily PRN Constipation  prochlorperazine   Tablet 10 milliGRAM(s) Oral every 6 hours PRN nausea, vomiting    Pertinent Labs:  02-03 Chol 187 mg/dL LDL 90 mg/dL  HDL 88 mg/dL Trig 45 mg/dL  02-03 HgbA1c 5.5%    POCT Blood Glucose.: 115 mg/dL (23 Feb 2022 13:53)    Skin: WDL    Estimated Needs:   [x] no change since previous assessment on 2/9  [ ] recalculated:     Previous Nutrition Diagnosis:   Nutrition Diagnostic Terminology #1 Malnutrition...     Malnutrition Moderate malnutrition in context of chronic illness.     Etiology Inadequate protein-energy intake related to lung cancer with mets to brain.     Signs/Symptoms physical signs of moderate muscle wasting and fat loss as noted.     Goal/Expected Outcome po intake >75% for meals/supplements - goal not met    Nutrition Diagnosis is [ ] ongoing  [ ] resolved [x] not applicable     New Nutrition Diagnosis: [x] Severe Malnutrition in context of acute illness    Related to: Inadequate protein-energy intake related to AMS, FTT 2/2 metastatic cancer    As evidenced by: Pt with >5% wt loss x 3 weeks; <50% of nutrition needs > 5 days    New Goal: Provide pt with nutrition/hydration within pt/HCP's wishes for care      Interventions:   Recommend  [x] Continue with current diet rx as ordered  [ ] Change Diet To:  [ ] Nutrition Supplement  [ ] Nutrition Support  [x] Other: Encourage po intake; cater to food preferences    Monitoring and Evaluation:   [ x ] PO intake [ x ] Tolerance to diet prescription [ x ] weights [ x ] labs [ x ] follow up per protocol  [ ] other: Assessment: Pt admitted for slurred speech; with brain mass lesion with surrounding edema, acute metabolic encephalopathy with sepsis with acute cholangitis with klebsiella bacteremia, elevated LFTs, transaminitis with CBD stone, anemia. Pt appears withdrawn & depressed not answering questions; continues to refuse all care (vital signs, lab work, meds, cleaning). Spouse involved & provides emotional support.  PMHx includes asthma, lung cancer with known brain metastases, s/p XRT and chemotherapy at [facility], s/p left craniotomy and resection of mass in 09/2021.  Pt remains full code. Patient has been accepted to [facility]; still awaiting insurance authorization.    Factors impacting intake: [ ] none [ ] nausea  [ ] vomiting [ ] diarrhea [ ] constipation  [ ]chewing problems [ ] swallowing issues  [x] other: AMS; persistent lack of appetite; long-term illness    Diet Prescription: Diet, Regular:   Supplement Feeding Modality:  Oral  Health Shake Cans or Servings Per Day:  1       Frequency:  Two Times a day (02-09-22 @ 14:23)    Intake: Continues to be poor, mostly <25% of meals; pt often refuses meals    Current Weight: 63.2 kg (2/24), 67.8 kg (2/4)  % Weight Change: 6.8% wt loss x 20 days    Fluid accumulation: No edema (2/10)    Physical appearance: Unable to visibly assess for malnutrition, as pt refusing to talk to RD & has covers pulled over head    Pertinent Medications: MEDICATIONS  (STANDING):  dexAMETHasone  Injectable 4 milliGRAM(s) IV Push two times a day  dextrose 40% Gel 15 Gram(s) Oral once  dextrose 50% Injectable 25 Gram(s) IV Push once  dextrose 50% Injectable 12.5 Gram(s) IV Push once  dextrose 50% Injectable 25 Gram(s) IV Push once  glucagon  Injectable 1 milliGRAM(s) IntraMuscular once  metoprolol tartrate Injectable 5 milliGRAM(s) IV Push every 6 hours  montelukast  Chewable 5 milliGRAM(s) Oral daily  pantoprazole    Tablet 40 milliGRAM(s) Oral before breakfast  valproate sodium IVPB 250 milliGRAM(s) IV Intermittent every 12 hours    MEDICATIONS  (PRN):  acetaminophen     Tablet .. 650 milliGRAM(s) Oral every 6 hours PRN Moderate Pain (4 - 6)  melatonin 3 milliGRAM(s) Oral at bedtime PRN Insomnia  polyethylene glycol 3350 17 Gram(s) Oral daily PRN Constipation  prochlorperazine   Tablet 10 milliGRAM(s) Oral every 6 hours PRN nausea, vomiting    Pertinent Labs:  02-03 Chol 187 mg/dL LDL 90 mg/dL  HDL 88 mg/dL Trig 45 mg/dL  02-03 HgbA1c 5.5%    POCT Blood Glucose.: 115 mg/dL (23 Feb 2022 13:53)    Skin: WDL    Estimated Needs:   [x] no change since previous assessment on 2/9  [ ] recalculated:     Previous Nutrition Diagnosis:   Nutrition Diagnostic Terminology #1 Malnutrition...     Malnutrition Moderate malnutrition in context of chronic illness.     Etiology Inadequate protein-energy intake related to lung cancer with mets to brain.     Signs/Symptoms physical signs of moderate muscle wasting and fat loss as noted.     Goal/Expected Outcome po intake >75% for meals/supplements - goal not met    Nutrition Diagnosis is [ ] ongoing  [ ] resolved [x] not applicable     New Nutrition Diagnosis: [x] Severe Malnutrition in context of acute illness    Related to: Inadequate protein-energy intake related to AMS, FTT 2/2 metastatic cancer    As evidenced by: Pt with >5% wt loss x 3 weeks; <50% of nutrition needs > 5 days    New Goal: Provide pt with nutrition/hydration within pt/HCP's wishes for care      Interventions:   Recommend  [x] Continue with current diet rx as ordered  [ ] Change Diet To:  [ ] Nutrition Supplement  [ ] Nutrition Support  [x] Other: Encourage po intake; cater to food preferences    Monitoring and Evaluation:   [ x ] PO intake [ x ] Tolerance to diet prescription [ x ] weights [ x ] labs [ x ] follow up per protocol  [ ] other: Assessment: Pt admitted for slurred speech; with brain mass lesion with surrounding edema, acute metabolic encephalopathy with sepsis with acute cholangitis with klebsiella bacteremia, elevated LFTs, transaminitis with CBD stone, anemia. Pt appears withdrawn & depressed, not answering questions; continues to refuse all care (vital signs, lab work, meds, cleaning). Spouse involved & provides emotional support.  PMHx includes asthma, lung cancer with known brain metastases, s/p XRT and chemotherapy at [facility], s/p left craniotomy and resection of mass in 09/2021.  Pt remains full code. Patient has been accepted to [facility]; still awaiting insurance authorization.    Factors impacting intake: [ ] none [ ] nausea  [ ] vomiting [ ] diarrhea [ ] constipation  [ ]chewing problems [ ] swallowing issues  [x] other: AMS; persistent lack of appetite; long-term illness    Diet Prescription: Diet, Regular:   Supplement Feeding Modality:  Oral  Health Shake Cans or Servings Per Day:  1       Frequency:  Two Times a day (02-09-22 @ 14:23)    Intake: Continues to be poor, mostly <25% of meals; pt often refuses meals    Current Weight: 63.2 kg (2/24), 67.8 kg (2/4)  % Weight Change: 6.8% wt loss x 20 days    Fluid accumulation: No edema (2/10)    Physical appearance: Unable to visibly assess for malnutrition, as pt refusing to talk to RD & has covers pulled over head    Pertinent Medications: MEDICATIONS  (STANDING):  dexAMETHasone  Injectable 4 milliGRAM(s) IV Push two times a day  dextrose 40% Gel 15 Gram(s) Oral once  dextrose 50% Injectable 25 Gram(s) IV Push once  dextrose 50% Injectable 12.5 Gram(s) IV Push once  dextrose 50% Injectable 25 Gram(s) IV Push once  glucagon  Injectable 1 milliGRAM(s) IntraMuscular once  metoprolol tartrate Injectable 5 milliGRAM(s) IV Push every 6 hours  montelukast  Chewable 5 milliGRAM(s) Oral daily  pantoprazole    Tablet 40 milliGRAM(s) Oral before breakfast  valproate sodium IVPB 250 milliGRAM(s) IV Intermittent every 12 hours    MEDICATIONS  (PRN):  acetaminophen     Tablet .. 650 milliGRAM(s) Oral every 6 hours PRN Moderate Pain (4 - 6)  melatonin 3 milliGRAM(s) Oral at bedtime PRN Insomnia  polyethylene glycol 3350 17 Gram(s) Oral daily PRN Constipation  prochlorperazine   Tablet 10 milliGRAM(s) Oral every 6 hours PRN nausea, vomiting    Pertinent Labs:  02-03 Chol 187 mg/dL LDL 90 mg/dL  HDL 88 mg/dL Trig 45 mg/dL  02-03 HgbA1c 5.5%    POCT Blood Glucose.: 115 mg/dL (23 Feb 2022 13:53)    Skin: WDL    Estimated Needs:   [x] no change since previous assessment on 2/9  [ ] recalculated:     Previous Nutrition Diagnosis:   Nutrition Diagnostic Terminology #1 Malnutrition...     Malnutrition Moderate malnutrition in context of chronic illness.     Etiology Inadequate protein-energy intake related to lung cancer with mets to brain.     Signs/Symptoms physical signs of moderate muscle wasting and fat loss as noted.     Goal/Expected Outcome po intake >75% for meals/supplements - goal not met    Nutrition Diagnosis is [ ] ongoing  [ ] resolved [x] not applicable     New Nutrition Diagnosis: [x] Severe Malnutrition in context of acute illness    Related to: Inadequate protein-energy intake related to AMS, FTT 2/2 metastatic cancer    As evidenced by: Pt with >5% wt loss x 3 weeks; <50% of nutrition needs > 5 days    New Goal: Provide pt with nutrition/hydration within pt/HCP's wishes for care      Interventions:   Recommend  [x] Continue with current diet rx as ordered  [ ] Change Diet To:  [ ] Nutrition Supplement  [ ] Nutrition Support  [x] Other: Encourage po intake; cater to food preferences    Monitoring and Evaluation:   [ x ] PO intake [ x ] Tolerance to diet prescription [ x ] weights [ x ] labs [ x ] follow up per protocol  [ ] other: Assessment: Pt admitted for slurred speech; with brain mass lesion with surrounding edema, acute metabolic encephalopathy with sepsis with acute cholangitis with klebsiella bacteremia, elevated LFTs, transaminitis with CBD stone, anemia. Pt appears withdrawn & depressed, not answering questions; continues to refuse all care (vital signs, lab work, meds, cleaning). Palliative care saw pt on 2/22 to discusses symptoms & goals, however pt refused to communicate.  PMHx includes asthma, lung cancer with known brain metastases, s/p XRT and chemotherapy at [facility], s/p left craniotomy and resection of mass in 09/2021.  Pt remains full code. Patient has been accepted to [facility]; still awaiting insurance authorization.    Factors impacting intake: [ ] none [ ] nausea  [ ] vomiting [ ] diarrhea [ ] constipation  [ ]chewing problems [ ] swallowing issues  [x] other: AMS; persistent lack of appetite; long-term illness    Diet Prescription: Diet, Regular:   Supplement Feeding Modality:  Oral  Health Shake Cans or Servings Per Day:  1       Frequency:  Two Times a day (02-09-22 @ 14:23)    Intake: Continues to be poor, mostly <25% of meals; pt often refuses meals    Current Weight: 63.2 kg (2/24), 67.8 kg (2/4)  % Weight Change: 6.8% wt loss x 20 days    Fluid accumulation: No edema (2/10)    Physical appearance: Unable to visibly assess for malnutrition, as pt refusing to talk to RD & has covers pulled over head    Pertinent Medications: MEDICATIONS  (STANDING):  dexAMETHasone  Injectable 4 milliGRAM(s) IV Push two times a day  dextrose 40% Gel 15 Gram(s) Oral once  dextrose 50% Injectable 25 Gram(s) IV Push once  dextrose 50% Injectable 12.5 Gram(s) IV Push once  dextrose 50% Injectable 25 Gram(s) IV Push once  glucagon  Injectable 1 milliGRAM(s) IntraMuscular once  metoprolol tartrate Injectable 5 milliGRAM(s) IV Push every 6 hours  montelukast  Chewable 5 milliGRAM(s) Oral daily  pantoprazole    Tablet 40 milliGRAM(s) Oral before breakfast  valproate sodium IVPB 250 milliGRAM(s) IV Intermittent every 12 hours    MEDICATIONS  (PRN):  acetaminophen     Tablet .. 650 milliGRAM(s) Oral every 6 hours PRN Moderate Pain (4 - 6)  melatonin 3 milliGRAM(s) Oral at bedtime PRN Insomnia  polyethylene glycol 3350 17 Gram(s) Oral daily PRN Constipation  prochlorperazine   Tablet 10 milliGRAM(s) Oral every 6 hours PRN nausea, vomiting    Pertinent Labs:  02-03 Chol 187 mg/dL LDL 90 mg/dL  HDL 88 mg/dL Trig 45 mg/dL  02-03 HgbA1c 5.5%    POCT Blood Glucose.: 115 mg/dL (23 Feb 2022 13:53)    Skin: WDL    Estimated Needs:   [x] no change since previous assessment on 2/9  [ ] recalculated:     Previous Nutrition Diagnosis:   Nutrition Diagnostic Terminology #1 Malnutrition...     Malnutrition Moderate malnutrition in context of chronic illness.     Etiology Inadequate protein-energy intake related to lung cancer with mets to brain.     Signs/Symptoms physical signs of moderate muscle wasting and fat loss as noted.     Goal/Expected Outcome po intake >75% for meals/supplements - goal not met    Nutrition Diagnosis is [ ] ongoing  [ ] resolved [x] not applicable     New Nutrition Diagnosis: [x] Severe Malnutrition in context of acute illness    Related to: Inadequate protein-energy intake related to AMS, FTT 2/2 metastatic lung cancer    As evidenced by: Pt with >5% wt loss x 3 weeks; <50% of nutrition needs > 5 days    New Goal: Provide pt with nutrition/hydration within pt/HCP's wishes for care      Interventions:   Recommend  [x] Continue with current diet rx as ordered  [ ] Change Diet To:  [ ] Nutrition Supplement  [ ] Nutrition Support  [x] Other: Encourage po intake; cater to food preferences    Monitoring and Evaluation:   [ x ] PO intake [ x ] Tolerance to diet prescription [ x ] weights [ x ] labs [ x ] follow up per protocol  [ ] other: Assessment: Pt admitted for slurred speech; with brain mass lesion with surrounding edema, acute metabolic encephalopathy with sepsis with acute cholangitis with klebsiella bacteremia, elevated LFTs, transaminitis with CBD stone, anemia. Pt appears withdrawn & depressed, not answering questions; continues to refuse all care (vital signs, lab work, meds, cleaning). Palliative care saw pt on 2/22 to discusses symptoms & goals, however pt refused to communicate.  PMHx includes asthma, lung cancer with known brain metastases, s/p XRT and chemotherapy at [facility], s/p left craniotomy and resection of mass in 09/2021.  Pt remains full code. Patient has been accepted to [facility]; pending bed availability & insurance authorization.    Factors impacting intake: [ ] none [ ] nausea  [ ] vomiting [ ] diarrhea [ ] constipation  [ ]chewing problems [ ] swallowing issues  [x] other: AMS; persistent lack of appetite; long-term illness    Diet Prescription: Diet, Regular:   Supplement Feeding Modality:  Oral  Health Shake Cans or Servings Per Day:  1       Frequency:  Two Times a day (02-09-22 @ 14:23)    Intake: Continues to be poor, mostly <25% of meals; pt often refuses meals    Current Weight: 63.2 kg (2/24), 67.8 kg (2/4)  % Weight Change: 6.8% wt loss x 20 days    Fluid accumulation: No edema (2/10)    Physical appearance: Unable to visibly assess for malnutrition, as pt refusing to talk to RD & has covers pulled over head    Pertinent Medications: MEDICATIONS  (STANDING):  dexAMETHasone  Injectable 4 milliGRAM(s) IV Push two times a day  dextrose 40% Gel 15 Gram(s) Oral once  dextrose 50% Injectable 25 Gram(s) IV Push once  dextrose 50% Injectable 12.5 Gram(s) IV Push once  dextrose 50% Injectable 25 Gram(s) IV Push once  glucagon  Injectable 1 milliGRAM(s) IntraMuscular once  metoprolol tartrate Injectable 5 milliGRAM(s) IV Push every 6 hours  montelukast  Chewable 5 milliGRAM(s) Oral daily  pantoprazole    Tablet 40 milliGRAM(s) Oral before breakfast  valproate sodium IVPB 250 milliGRAM(s) IV Intermittent every 12 hours    MEDICATIONS  (PRN):  acetaminophen     Tablet .. 650 milliGRAM(s) Oral every 6 hours PRN Moderate Pain (4 - 6)  melatonin 3 milliGRAM(s) Oral at bedtime PRN Insomnia  polyethylene glycol 3350 17 Gram(s) Oral daily PRN Constipation  prochlorperazine   Tablet 10 milliGRAM(s) Oral every 6 hours PRN nausea, vomiting    Pertinent Labs:  02-03 Chol 187 mg/dL LDL 90 mg/dL  HDL 88 mg/dL Trig 45 mg/dL  02-03 HgbA1c 5.5%    POCT Blood Glucose.: 115 mg/dL (23 Feb 2022 13:53)    Skin: WDL    Estimated Needs:   [x] no change since previous assessment on 2/9  [ ] recalculated:     Previous Nutrition Diagnosis:   Nutrition Diagnostic Terminology #1 Malnutrition...     Malnutrition Moderate malnutrition in context of chronic illness.     Etiology Inadequate protein-energy intake related to lung cancer with mets to brain.     Signs/Symptoms physical signs of moderate muscle wasting and fat loss as noted.     Goal/Expected Outcome po intake >75% for meals/supplements - goal not met    Nutrition Diagnosis is [ ] ongoing  [ ] resolved [x] not applicable     New Nutrition Diagnosis: [x] Severe Malnutrition in context of acute illness    Related to: Inadequate protein-energy intake related to AMS, FTT 2/2 metastatic lung cancer    As evidenced by: Pt with >5% wt loss x 3 weeks; <50% of nutrition needs > 5 days    New Goal: Provide pt with nutrition/hydration within pt/HCP's wishes for care      Interventions:   Recommend  [x] Continue with current diet rx as ordered  [ ] Change Diet To:  [ ] Nutrition Supplement  [ ] Nutrition Support  [x] Other: Encourage po intake; cater to food preferences    Monitoring and Evaluation:   [ x ] PO intake [ x ] Tolerance to diet prescription [ x ] weights [ x ] labs [ x ] follow up per protocol  [ ] other:

## 2022-02-24 NOTE — DIETITIAN NUTRITION RISK NOTIFICATION - TREATMENT: THE FOLLOWING DIET HAS BEEN RECOMMENDED
Diet, Regular:   Supplement Feeding Modality:  Oral  Health Shake Cans or Servings Per Day:  1       Frequency:  Two Times a day (02-09-22 @ 14:23) [Active]      
Diet, Regular:   Supplement Feeding Modality:  Oral  Health Shake Cans or Servings Per Day:  1       Frequency:  Two Times a day (02-09-22 @ 14:23) [Pending Verification By Attending]  Diet, Regular (02-03-22 @ 09:36) [Active]

## 2022-02-24 NOTE — PROGRESS NOTE ADULT - ASSESSMENT
HPI: 44 years old female with h/o asthma, NSCLC with known brain metastases, S/P XRT and chemotherapy at [facility] lung cancer S/P left craniotomy and resection of mass in 09/2021 (Dr Parviz Paul) present to ED with slurry speech started at 6AM, last known normal was 4AM  Tachycardic to 120s ( per patient, baseline HR in 120), BP stable, afebrile sat well at RA. WBC 13.36, K 3.5, Cr 1.17, AST/ALT 1207/1231, lipase normal, CK 78. CT head with Interval decompressive left frontotemporal craniectomy with decreased mass effect and resolved rightward midline shift secondary to significant left frontal vasogenic edema. No acute intracranial hemorrhage, extra-axial collection or new suspicious region of vasogenic edema. CTA head/neck with no vaso-occlusive disease. CT perfusion-26 mL of tissue with elevated time to maximum in the left superior medial frontal lobe, likely representing chronic posttreatment changes.    Course:     Brain mass lesion w/surrounding edema   present with slurry speech and slight right leg weakness  CT head with Interval decompressive left frontotemporal craniectomy with decreased mass effect and resolved rightward midline shift secondary to significant left frontal vasogenic edema. No acute intracranial hemorrhage, extra-axial collection or new suspicious region of vasogenic edema. CTA head/neck with no vaso-occlusive disease. CT perfusion-26 mL of tissue with elevated time to maximum in the left superior medial frontal lobe, likely representing chronic posttreatment changes  Neurology recs apprec  Cont decadron.   No statin due to elevated LFT  No aspirin due to thrombocytopenia.   MRI brain with and without contrast ;  New postop changes are identified with a large area of abnormal enhancement seen involving the left frontal cortical subcortical region with increased surrounding edema mass effect on left lateral ventricle left-to-right shift. This finding is worrisome for progression of patient's underlying disease process  EEG noted  PT/OT/Speech eval passed; regular diet  2/6 CTH- no bleed  c/w decadron, depakon, f/u levels.    acute metabolic encephalopathy with sepsis with acute cholangitis with klebsiella bacteremia.    HIT Ab negative, Fibrinogen not indicative of DIC  s/p platelet and blood transfusions. H and H stable.   s/p IV abx  Rpt blood cx - Neg.    Elevated LFTs. Transaminitis with CBD stone   Improving  New abnormality. No history of ETOH, drug use, recent change in medications  Acute hepatitis panel Neg  GI on board  reviewed ID and IR notes.   My colleague discussed with Dr Oglesby at [facility] about this patient and she has accepted the patient for higher level of care but unfortunately still pending surgical bed availability.     NSCLC metastatic to brain.   ·  Plan: NSCLC with known brain metastases, S/P XRT and chemotherapy at [facility] lung cancer S/P left craniotomy and resection of mass in 09/2021  On Tagrisso 80mg daily at home.  Hematology/oncology on board.  Routine EEG with left sided dysfunction, no epileptogenic discharge  Continue with  Decadron 4mg BID for Brain edema  No need for Aspirin at this time    Mild intermittent asthma. Not in exacerbation. albuterol prn.    Hypocalcemia after correction for hypoalbuminemia is 8.3.     Anemia. no active gross bleeding reported. FOBT is negative.     Full Code  Care manager Amparo from [facility] called and updated that patient has been accepted pending surgical bed availability.  awaiting transfer to [facility].

## 2022-02-24 NOTE — DIETITIAN NUTRITION RISK NOTIFICATION - MALNUTRITION EVALUATION AS DEMONSTRATED BY (ADULTS > 20 YEARS OF AGE)
Weight loss.../Inadequate energy intake...
Loss of subcutaneous fat.../Loss of muscle...
monthly or less

## 2022-02-25 PROCEDURE — 99232 SBSQ HOSP IP/OBS MODERATE 35: CPT

## 2022-02-25 RX ADMIN — Medication 4 MILLIGRAM(S): at 05:37

## 2022-02-25 RX ADMIN — Medication 26.25 MILLIGRAM(S): at 17:36

## 2022-02-25 RX ADMIN — Medication 4 MILLIGRAM(S): at 17:36

## 2022-02-25 RX ADMIN — Medication 26.25 MILLIGRAM(S): at 05:37

## 2022-02-25 NOTE — PROGRESS NOTE ADULT - SUBJECTIVE AND OBJECTIVE BOX
INTERVAL HPI/OVERNIGHT EVENTS: No events overnight.    47y  Vital Signs Last 24 Hrs  T(C): 36.7 (25 Feb 2022 16:08), Max: 36.9 (25 Feb 2022 14:07)  T(F): 98.1 (25 Feb 2022 16:08), Max: 98.4 (25 Feb 2022 14:07)  HR: 79 (25 Feb 2022 16:08) (70 - 79)  BP: 97/69 (25 Feb 2022 16:08) (97/69 - 104/72)  BP(mean): 82 (25 Feb 2022 14:07) (82 - 82)  RR: 18 (25 Feb 2022 16:08) (18 - 18)  SpO2: 96% (25 Feb 2022 16:08) (96% - 100%)  I&O's Summary    MEDICATIONS  (STANDING):  dexAMETHasone  Injectable 4 milliGRAM(s) IV Push two times a day  dextrose 40% Gel 15 Gram(s) Oral once  dextrose 50% Injectable 25 Gram(s) IV Push once  dextrose 50% Injectable 12.5 Gram(s) IV Push once  dextrose 50% Injectable 25 Gram(s) IV Push once  glucagon  Injectable 1 milliGRAM(s) IntraMuscular once  metoprolol tartrate Injectable 5 milliGRAM(s) IV Push every 6 hours  montelukast  Chewable 5 milliGRAM(s) Oral daily  pantoprazole    Tablet 40 milliGRAM(s) Oral before breakfast  valproate sodium IVPB 250 milliGRAM(s) IV Intermittent every 12 hours    MEDICATIONS  (PRN):  acetaminophen     Tablet .. 650 milliGRAM(s) Oral every 6 hours PRN Moderate Pain (4 - 6)  melatonin 3 milliGRAM(s) Oral at bedtime PRN Insomnia  polyethylene glycol 3350 17 Gram(s) Oral daily PRN Constipation  prochlorperazine   Tablet 10 milliGRAM(s) Oral every 6 hours PRN nausea, vomiting    LABS:    trop              CAPILLARY BLOOD GLUCOSE      RADIOLOGY & ADDITIONAL TESTS:    Imaging Personally Reviewed:  [ ] YES  [ ] NO    Consultant(s) Notes Reviewed:  [x ] YES  [ ] NO    PHYSICAL EXAM:  GENERAL: Moderately built, no acute distress   CHEST/LUNG: Pt refused  HEART: RRR, Pt refused  ABDOMEN: patient refused  EXTREMITIES:  Pt refused  NERVOUS SYSTEM:  Limited exam due to poor participation.      A & P:        Care Discussed with Consultants/Other Providers [ x] YES  [ ] NO

## 2022-02-25 NOTE — PROGRESS NOTE ADULT - ASSESSMENT
HPI: 44 years old female with h/o asthma, NSCLC with known brain metastases, S/P XRT and chemotherapy at [facility] lung cancer S/P left craniotomy and resection of mass in 09/2021 (Dr Parviz Paul) present to ED with slurry speech started at 6AM, last known normal was 4AM  Tachycardic to 120s ( per patient, baseline HR in 120), BP stable, afebrile sat well at RA. WBC 13.36, K 3.5, Cr 1.17, AST/ALT 1207/1231, lipase normal, CK 78. CT head with Interval decompressive left frontotemporal craniectomy with decreased mass effect and resolved rightward midline shift secondary to significant left frontal vasogenic edema. No acute intracranial hemorrhage, extra-axial collection or new suspicious region of vasogenic edema. CTA head/neck with no vaso-occlusive disease. CT perfusion-26 mL of tissue with elevated time to maximum in the left superior medial frontal lobe, likely representing chronic posttreatment changes.    Course:     Brain mass lesion w/surrounding edema   present with slurry speech and slight right leg weakness  CT head with Interval decompressive left frontotemporal craniectomy with decreased mass effect and resolved rightward midline shift secondary to significant left frontal vasogenic edema. No acute intracranial hemorrhage, extra-axial collection or new suspicious region of vasogenic edema. CTA head/neck with no vaso-occlusive disease. CT perfusion-26 mL of tissue with elevated time to maximum in the left superior medial frontal lobe, likely representing chronic posttreatment changes  Neurology recs apprec  Cont decadron.   No statin due to elevated LFT  No aspirin due to thrombocytopenia.   MRI brain with and without contrast ;  New postop changes are identified with a large area of abnormal enhancement seen involving the left frontal cortical subcortical region with increased surrounding edema mass effect on left lateral ventricle left-to-right shift. This finding is worrisome for progression of patient's underlying disease process  EEG noted  PT/OT/Speech eval passed; regular diet  2/6 CTH- no bleed  c/w decadron, depakon, f/u levels.    acute metabolic encephalopathy with sepsis with acute cholangitis with klebsiella bacteremia.    HIT Ab negative, Fibrinogen not indicative of DIC  s/p platelet and blood transfusions. H and H stable.   s/p IV abx  Rpt blood cx - Neg.    Elevated LFTs. Transaminitis with CBD stone   Improving  New abnormality. No history of ETOH, drug use, recent change in medications  Acute hepatitis panel Neg  GI on board  reviewed ID and IR notes.   My colleague discussed with Dr Oglesby at [facility] about this patient and she has accepted the patient for higher level of care but unfortunately still pending surgical bed availability.     NSCLC metastatic to brain.   ·  Plan: NSCLC with known brain metastases, S/P XRT and chemotherapy at [facility] lung cancer S/P left craniotomy and resection of mass in 09/2021  On Tagrisso 80mg daily at home.  Hematology/oncology on board.  Routine EEG with left sided dysfunction, no epileptogenic discharge  Continue with  Decadron 4mg BID for Brain edema  No need for Aspirin at this time    Mild intermittent asthma. Not in exacerbation. albuterol prn.    Hypocalcemia after correction for hypoalbuminemia is 8.3.     Anemia. no active gross bleeding reported. FOBT is negative.     Full Code  Care manager Amparo from [facility] called and updated that patient has been accepted pending surgical bed availability.  awaiting transfer to [facility].

## 2022-02-26 LAB
GLUCOSE BLDC GLUCOMTR-MCNC: 112 MG/DL — HIGH (ref 70–99)
GLUCOSE BLDC GLUCOMTR-MCNC: 178 MG/DL — HIGH (ref 70–99)
GLUCOSE BLDC GLUCOMTR-MCNC: 94 MG/DL — SIGNIFICANT CHANGE UP (ref 70–99)

## 2022-02-26 PROCEDURE — 99233 SBSQ HOSP IP/OBS HIGH 50: CPT

## 2022-02-26 RX ADMIN — Medication 4 MILLIGRAM(S): at 17:47

## 2022-02-26 RX ADMIN — Medication 26.25 MILLIGRAM(S): at 07:41

## 2022-02-26 RX ADMIN — MONTELUKAST 5 MILLIGRAM(S): 4 TABLET, CHEWABLE ORAL at 15:18

## 2022-02-26 RX ADMIN — Medication 26.25 MILLIGRAM(S): at 17:47

## 2022-02-26 RX ADMIN — PANTOPRAZOLE SODIUM 40 MILLIGRAM(S): 20 TABLET, DELAYED RELEASE ORAL at 07:41

## 2022-02-26 RX ADMIN — Medication 4 MILLIGRAM(S): at 07:41

## 2022-02-26 NOTE — PROGRESS NOTE ADULT - ASSESSMENT
HPI: 44 years old female with h/o asthma, NSCLC with known brain metastases, S/P XRT and chemotherapy at [facility] lung cancer S/P left craniotomy and resection of mass in 09/2021 (Dr Parviz Paul) present to ED with slurry speech started at 6AM, last known normal was 4AM  Tachycardic to 120s ( per patient, baseline HR in 120), BP stable, afebrile sat well at RA. WBC 13.36, K 3.5, Cr 1.17, AST/ALT 1207/1231, lipase normal, CK 78. CT head with Interval decompressive left frontotemporal craniectomy with decreased mass effect and resolved rightward midline shift secondary to significant left frontal vasogenic edema. No acute intracranial hemorrhage, extra-axial collection or new suspicious region of vasogenic edema. CTA head/neck with no vaso-occlusive disease. CT perfusion-26 mL of tissue with elevated time to maximum in the left superior medial frontal lobe, likely representing chronic posttreatment changes.    Course:     Brain mass lesion w/surrounding edema   present with slurry speech and slight right leg weakness  CT head with Interval decompressive left frontotemporal craniectomy with decreased mass effect and resolved rightward midline shift secondary to significant left frontal vasogenic edema. No acute intracranial hemorrhage, extra-axial collection or new suspicious region of vasogenic edema. CTA head/neck with no vaso-occlusive disease. CT perfusion-26 mL of tissue with elevated time to maximum in the left superior medial frontal lobe, likely representing chronic posttreatment changes  Neurology recs apprec  Cont decadron.   No statin due to elevated LFT  No aspirin due to thrombocytopenia.   MRI brain with and without contrast ;  New postop changes are identified with a large area of abnormal enhancement seen involving the left frontal cortical subcortical region with increased surrounding edema mass effect on left lateral ventricle left-to-right shift. This finding is worrisome for progression of patient's underlying disease process  EEG noted  PT/OT/Speech eval passed; regular diet  2/6 CTH- no bleed  c/w decadron, depakon, f/u levels.    acute metabolic encephalopathy with sepsis with acute cholangitis with klebsiella bacteremia.    HIT Ab negative, Fibrinogen not indicative of DIC  s/p platelet and blood transfusions. H and H stable.   s/p IV abx  Rpt blood cx - Neg.    Elevated LFTs. Transaminitis with CBD stone   Improving  New abnormality. No history of ETOH, drug use, recent change in medications  Acute hepatitis panel Neg  GI on board  reviewed ID and IR notes.   My colleague discussed with Dr Oglesby at [facility] about this patient and she has accepted the patient for higher level of care but unfortunately still pending surgical bed availability.     NSCLC metastatic to brain.   ·  Plan: NSCLC with known brain metastases, S/P XRT and chemotherapy at [facility] lung cancer S/P left craniotomy and resection of mass in 09/2021  On Tagrisso 80mg daily at home.  Hematology/oncology on board.  Routine EEG with left sided dysfunction, no epileptogenic discharge  Continue with  Decadron 4mg BID for Brain edema  No need for Aspirin at this time    Mild intermittent asthma. Not in exacerbation. albuterol prn.    Hypocalcemia after correction for hypoalbuminemia is 8.3.     Anemia. no active gross bleeding reported. FOBT is negative.     Full Code  Care manager Amparo from [facility] called and updated that patient has been accepted pending surgical bed availability.  awaiting transfer to [facility].     HPI: 44 years old female with h/o asthma, NSCLC with known brain metastases, S/P XRT and chemotherapy at [facility] lung cancer S/P left craniotomy and resection of mass in 09/2021 (Dr Parviz Paul) present to ED with slurry speech started at 6AM, last known normal was 4AM  Tachycardic to 120s ( per patient, baseline HR in 120), BP stable, afebrile sat well at RA. WBC 13.36, K 3.5, Cr 1.17, AST/ALT 1207/1231, lipase normal, CK 78. CT head with Interval decompressive left frontotemporal craniectomy with decreased mass effect and resolved rightward midline shift secondary to significant left frontal vasogenic edema. No acute intracranial hemorrhage, extra-axial collection or new suspicious region of vasogenic edema. CTA head/neck with no vaso-occlusive disease. CT perfusion-26 mL of tissue with elevated time to maximum in the left superior medial frontal lobe, likely representing chronic posttreatment changes.    Course:     Brain mass lesion w/surrounding edema   present with slurry speech and slight right leg weakness  CT head with Interval decompressive left frontotemporal craniectomy with decreased mass effect and resolved rightward midline shift secondary to significant left frontal vasogenic edema. No acute intracranial hemorrhage, extra-axial collection or new suspicious region of vasogenic edema. CTA head/neck with no vaso-occlusive disease. CT perfusion-26 mL of tissue with elevated time to maximum in the left superior medial frontal lobe, likely representing chronic posttreatment changes  Neurology recs apprec  Cont decadron.   No statin due to elevated LFT  No aspirin due to thrombocytopenia.   MRI brain with and without contrast ;  New postop changes are identified with a large area of abnormal enhancement seen involving the left frontal cortical subcortical region with increased surrounding edema mass effect on left lateral ventricle left-to-right shift. This finding is worrisome for progression of patient's underlying disease process  EEG noted  PT/OT/Speech eval passed; regular diet  2/6 CTH- no bleed  c/w decadron, depakon, f/u levels.    acute metabolic encephalopathy with sepsis with acute cholangitis with klebsiella bacteremia.    HIT Ab negative, Fibrinogen not indicative of DIC  s/p platelet and blood transfusions. H and H stable.   s/p IV abx  Rpt blood cx - Neg.    Elevated LFTs. Transaminitis with CBD stone   Improving  New abnormality. No history of ETOH, drug use, recent change in medications  Acute hepatitis panel Neg  GI on board  reviewed ID and IR notes.   My colleague discussed with Dr Oglesby at [facility] about this patient and she has accepted the patient for higher level of care but unfortunately still pending surgical bed availability.     NSCLC metastatic to brain.   ·  Plan: NSCLC with known brain metastases, S/P XRT and chemotherapy at [facility] lung cancer S/P left craniotomy and resection of mass in 09/2021  On Tagrisso 80mg daily at home.  Hematology/oncology on board.  Routine EEG with left sided dysfunction, no epileptogenic discharge  Continue with  Decadron 4mg BID for Brain edema  No need for Aspirin at this time    Mild intermittent asthma. Not in exacerbation. albuterol prn.    Hypocalcemia after correction for hypoalbuminemia is 8.3.     Anemia. no active gross bleeding reported. FOBT is negative.     Full Code  [facility]: patient has been accepted pending surgical bed availability.  awaiting transfer to [facility].

## 2022-02-26 NOTE — PROGRESS NOTE ADULT - SUBJECTIVE AND OBJECTIVE BOX
Patient is a 47y old  Female who presents with a chief complaint of CVA (25 Feb 2022 16:21)    INTERVAL HPI/OVERNIGHT EVENTS: Patients seen and examined at bedside this morning. No acute events overnight. Pt reports not in acute distress.     MEDICATIONS  (STANDING):  dexAMETHasone  Injectable 4 milliGRAM(s) IV Push two times a day  dextrose 40% Gel 15 Gram(s) Oral once  dextrose 50% Injectable 25 Gram(s) IV Push once  dextrose 50% Injectable 12.5 Gram(s) IV Push once  dextrose 50% Injectable 25 Gram(s) IV Push once  glucagon  Injectable 1 milliGRAM(s) IntraMuscular once  metoprolol tartrate Injectable 5 milliGRAM(s) IV Push every 6 hours  montelukast  Chewable 5 milliGRAM(s) Oral daily  pantoprazole    Tablet 40 milliGRAM(s) Oral before breakfast  valproate sodium IVPB 250 milliGRAM(s) IV Intermittent every 12 hours    MEDICATIONS  (PRN):  acetaminophen     Tablet .. 650 milliGRAM(s) Oral every 6 hours PRN Moderate Pain (4 - 6)  melatonin 3 milliGRAM(s) Oral at bedtime PRN Insomnia  polyethylene glycol 3350 17 Gram(s) Oral daily PRN Constipation  prochlorperazine   Tablet 10 milliGRAM(s) Oral every 6 hours PRN nausea, vomiting    Allergies    codeine (Other)  latex (Rash)    Intolerances      REVIEW OF SYSTEMS:  All other systems reviewed and are negative    Vital Signs Last 24 Hrs  T(C): 36.7 (26 Feb 2022 07:50), Max: 36.9 (25 Feb 2022 14:07)  T(F): 98.1 (26 Feb 2022 07:50), Max: 98.4 (25 Feb 2022 14:07)  HR: 87 (26 Feb 2022 07:50) (77 - 87)  BP: 99/64 (26 Feb 2022 07:50) (97/69 - 104/72)  BP(mean): 82 (25 Feb 2022 14:07) (82 - 82)  RR: 18 (26 Feb 2022 07:50) (18 - 18)  SpO2: 98% (26 Feb 2022 07:50) (96% - 100%)  Daily     Daily   I&O's Summary    CAPILLARY BLOOD GLUCOSE      POCT Blood Glucose.: 94 mg/dL (26 Feb 2022 07:40)    PHYSICAL EXAM:  GENERAL: Moderately built, no acute distress   CHEST/LUNG: Pt refused  HEART: RRR, Pt refused  ABDOMEN: patient refused  EXTREMITIES:  Pt refused  NERVOUS SYSTEM:  Limited exam due to poor participation.    Labs                                 Patient is a 47y old  Female who presents with a chief complaint of CVA (25 Feb 2022 16:21)    INTERVAL HPI/OVERNIGHT EVENTS: Patients seen and examined at bedside this morning. No acute events overnight. Pt reports not in acute distress. Does not want to speak to me. Refused to remove blanket from over head. Asking to be left alone. As per nurse, she has been stable.     MEDICATIONS  (STANDING):  dexAMETHasone  Injectable 4 milliGRAM(s) IV Push two times a day  dextrose 40% Gel 15 Gram(s) Oral once  dextrose 50% Injectable 25 Gram(s) IV Push once  dextrose 50% Injectable 12.5 Gram(s) IV Push once  dextrose 50% Injectable 25 Gram(s) IV Push once  glucagon  Injectable 1 milliGRAM(s) IntraMuscular once  metoprolol tartrate Injectable 5 milliGRAM(s) IV Push every 6 hours  montelukast  Chewable 5 milliGRAM(s) Oral daily  pantoprazole    Tablet 40 milliGRAM(s) Oral before breakfast  valproate sodium IVPB 250 milliGRAM(s) IV Intermittent every 12 hours    MEDICATIONS  (PRN):  acetaminophen     Tablet .. 650 milliGRAM(s) Oral every 6 hours PRN Moderate Pain (4 - 6)  melatonin 3 milliGRAM(s) Oral at bedtime PRN Insomnia  polyethylene glycol 3350 17 Gram(s) Oral daily PRN Constipation  prochlorperazine   Tablet 10 milliGRAM(s) Oral every 6 hours PRN nausea, vomiting    Allergies    codeine (Other)  latex (Rash)    Intolerances      REVIEW OF SYSTEMS:  All other systems reviewed and are negative    Vital Signs Last 24 Hrs  T(C): 36.7 (26 Feb 2022 07:50), Max: 36.9 (25 Feb 2022 14:07)  T(F): 98.1 (26 Feb 2022 07:50), Max: 98.4 (25 Feb 2022 14:07)  HR: 87 (26 Feb 2022 07:50) (77 - 87)  BP: 99/64 (26 Feb 2022 07:50) (97/69 - 104/72)  BP(mean): 82 (25 Feb 2022 14:07) (82 - 82)  RR: 18 (26 Feb 2022 07:50) (18 - 18)  SpO2: 98% (26 Feb 2022 07:50) (96% - 100%)  Daily     Daily   I&O's Summary    CAPILLARY BLOOD GLUCOSE      POCT Blood Glucose.: 94 mg/dL (26 Feb 2022 07:40)    PHYSICAL EXAM:  GENERAL: Moderately built, no acute distress   CHEST/LUNG: Pt refused  HEART: RRR, Pt refused  ABDOMEN: patient refused  EXTREMITIES:  Pt refused  NERVOUS SYSTEM:  Limited exam due to poor participation.    Labs

## 2022-02-27 LAB
FLUAV AG NPH QL: SIGNIFICANT CHANGE UP
FLUBV AG NPH QL: SIGNIFICANT CHANGE UP
GLUCOSE BLDC GLUCOMTR-MCNC: 127 MG/DL — HIGH (ref 70–99)
GLUCOSE BLDC GLUCOMTR-MCNC: 82 MG/DL — SIGNIFICANT CHANGE UP (ref 70–99)
GLUCOSE BLDC GLUCOMTR-MCNC: 93 MG/DL — SIGNIFICANT CHANGE UP (ref 70–99)
GLUCOSE BLDC GLUCOMTR-MCNC: 98 MG/DL — SIGNIFICANT CHANGE UP (ref 70–99)
SARS-COV-2 RNA SPEC QL NAA+PROBE: SIGNIFICANT CHANGE UP

## 2022-02-27 PROCEDURE — 99233 SBSQ HOSP IP/OBS HIGH 50: CPT

## 2022-02-27 RX ADMIN — Medication 26.25 MILLIGRAM(S): at 17:59

## 2022-02-27 RX ADMIN — Medication 26.25 MILLIGRAM(S): at 06:03

## 2022-02-27 RX ADMIN — MONTELUKAST 5 MILLIGRAM(S): 4 TABLET, CHEWABLE ORAL at 12:56

## 2022-02-27 RX ADMIN — Medication 4 MILLIGRAM(S): at 17:59

## 2022-02-27 RX ADMIN — Medication 4 MILLIGRAM(S): at 06:00

## 2022-02-27 NOTE — PROGRESS NOTE ADULT - ASSESSMENT
HPI: 44 years old female with h/o asthma, NSCLC with known brain metastases, S/P XRT and chemotherapy at [facility] lung cancer S/P left craniotomy and resection of mass in 09/2021 (Dr Parviz Paul) present to ED with slurry speech started at 6AM, last known normal was 4AM  Tachycardic to 120s ( per patient, baseline HR in 120), BP stable, afebrile sat well at RA. WBC 13.36, K 3.5, Cr 1.17, AST/ALT 1207/1231, lipase normal, CK 78. CT head with Interval decompressive left frontotemporal craniectomy with decreased mass effect and resolved rightward midline shift secondary to significant left frontal vasogenic edema. No acute intracranial hemorrhage, extra-axial collection or new suspicious region of vasogenic edema. CTA head/neck with no vaso-occlusive disease. CT perfusion-26 mL of tissue with elevated time to maximum in the left superior medial frontal lobe, likely representing chronic posttreatment changes.    Course:     Brain mass lesion w/surrounding edema   present with slurry speech and slight right leg weakness  CT head with Interval decompressive left frontotemporal craniectomy with decreased mass effect and resolved rightward midline shift secondary to significant left frontal vasogenic edema. No acute intracranial hemorrhage, extra-axial collection or new suspicious region of vasogenic edema. CTA head/neck with no vaso-occlusive disease. CT perfusion-26 mL of tissue with elevated time to maximum in the left superior medial frontal lobe, likely representing chronic posttreatment changes  Neurology recs apprec  Cont decadron.   No statin due to elevated LFT  No aspirin due to thrombocytopenia.   MRI brain with and without contrast ;  New postop changes are identified with a large area of abnormal enhancement seen involving the left frontal cortical subcortical region with increased surrounding edema mass effect on left lateral ventricle left-to-right shift. This finding is worrisome for progression of patient's underlying disease process  EEG noted  PT/OT/Speech eval passed; regular diet  2/6 CTH- no bleed  c/w decadron, depakon, f/u levels.    acute metabolic encephalopathy with sepsis with acute cholangitis with klebsiella bacteremia.    HIT Ab negative, Fibrinogen not indicative of DIC  s/p platelet and blood transfusions. H and H stable.   s/p IV abx  Rpt blood cx - Neg.    Elevated LFTs. Transaminitis with CBD stone   Improving  New abnormality. No history of ETOH, drug use, recent change in medications  Acute hepatitis panel Neg  GI on board  reviewed ID and IR notes.   My colleague discussed with Dr Oglesby at [facility] about this patient and she has accepted the patient for higher level of care but unfortunately still pending surgical bed availability.     NSCLC metastatic to brain.   ·  Plan: NSCLC with known brain metastases, S/P XRT and chemotherapy at [facility] lung cancer S/P left craniotomy and resection of mass in 09/2021  On Tagrisso 80mg daily at home.  Hematology/oncology on board.  Routine EEG with left sided dysfunction, no epileptogenic discharge  Continue with  Decadron 4mg BID for Brain edema  No need for Aspirin at this time    Mild intermittent asthma. Not in exacerbation. albuterol prn.    Hypocalcemia after correction for hypoalbuminemia is 8.3.     Anemia. no active gross bleeding reported. FOBT is negative.     Full Code  [facility]: patient has been accepted pending surgical bed availability.  awaiting transfer to [facility].

## 2022-02-27 NOTE — PROGRESS NOTE ADULT - REASON FOR ADMISSION
CVA

## 2022-02-27 NOTE — PROGRESS NOTE ADULT - NUTRITIONAL ASSESSMENT
This patient has been assessed with a concern for Malnutrition and has been determined to have a diagnosis/diagnoses of Moderate protein-calorie malnutrition.    This patient is being managed with:   Diet Regular-  Supplement Feeding Modality:  Oral  Health Shake Cans or Servings Per Day:  1       Frequency:  Two Times a day  Entered: Feb 9 2022  2:23PM    
This patient has been assessed with a concern for Malnutrition and has been determined to have a diagnosis/diagnoses of Severe protein-calorie malnutrition.    This patient is being managed with:   Diet Regular-  Supplement Feeding Modality:  Oral  Health Shake Cans or Servings Per Day:  1       Frequency:  Two Times a day  Entered: Feb 9 2022  2:23PM    
This patient has been assessed with a concern for Malnutrition and has been determined to have a diagnosis/diagnoses of Moderate protein-calorie malnutrition.    This patient is being managed with:   Diet Regular-  Supplement Feeding Modality:  Oral  Health Shake Cans or Servings Per Day:  1       Frequency:  Two Times a day  Entered: Feb 9 2022  2:23PM    
This patient has been assessed with a concern for Malnutrition and has been determined to have a diagnosis/diagnoses of Severe protein-calorie malnutrition.    This patient is being managed with:   Diet Regular-  Supplement Feeding Modality:  Oral  Health Shake Cans or Servings Per Day:  1       Frequency:  Two Times a day  Entered: Feb 9 2022  2:23PM    
This patient has been assessed with a concern for Malnutrition and has been determined to have a diagnosis/diagnoses of Moderate protein-calorie malnutrition.    This patient is being managed with:   Diet Regular-  Supplement Feeding Modality:  Oral  Health Shake Cans or Servings Per Day:  1       Frequency:  Two Times a day  Entered: Feb 9 2022  2:23PM    
This patient has been assessed with a concern for Malnutrition and has been determined to have a diagnosis/diagnoses of Severe protein-calorie malnutrition.    This patient is being managed with:   Diet Regular-  Supplement Feeding Modality:  Oral  Health Shake Cans or Servings Per Day:  1       Frequency:  Two Times a day  Entered: Feb 9 2022  2:23PM    
This patient has been assessed with a concern for Malnutrition and has been determined to have a diagnosis/diagnoses of Severe protein-calorie malnutrition.    This patient is being managed with:   Diet Regular-  Supplement Feeding Modality:  Oral  Health Shake Cans or Servings Per Day:  1       Frequency:  Two Times a day  Entered: Feb 9 2022  2:23PM

## 2022-02-27 NOTE — PROGRESS NOTE ADULT - PROVIDER SPECIALTY LIST ADULT
Gastroenterology
Gastroenterology
Heme/Onc
Heme/Onc
Hospitalist
Heme/Onc
Hospitalist
Infectious Disease
Neurology
Gastroenterology
Hospitalist
Infectious Disease
Neurology
Neurology
Gastroenterology
Heme/Onc
Hospitalist
Infectious Disease
Hospitalist
Heme/Onc
Hospitalist
Heme/Onc

## 2022-02-27 NOTE — PROGRESS NOTE ADULT - SUBJECTIVE AND OBJECTIVE BOX
Patient is a 47y old  Female who presents with a chief complaint of CVA (26 Feb 2022 10:44)    INTERVAL HPI/OVERNIGHT EVENTS: Patients seen and examined at bedside this morning. No acute events overnight. Pt refusing to speak to me or answer questions. Eating breakfast and staring at TV.     MEDICATIONS  (STANDING):  dexAMETHasone  Injectable 4 milliGRAM(s) IV Push two times a day  dextrose 40% Gel 15 Gram(s) Oral once  dextrose 50% Injectable 25 Gram(s) IV Push once  dextrose 50% Injectable 12.5 Gram(s) IV Push once  dextrose 50% Injectable 25 Gram(s) IV Push once  glucagon  Injectable 1 milliGRAM(s) IntraMuscular once  metoprolol tartrate Injectable 5 milliGRAM(s) IV Push every 6 hours  montelukast  Chewable 5 milliGRAM(s) Oral daily  pantoprazole    Tablet 40 milliGRAM(s) Oral before breakfast  valproate sodium IVPB 250 milliGRAM(s) IV Intermittent every 12 hours    MEDICATIONS  (PRN):  acetaminophen     Tablet .. 650 milliGRAM(s) Oral every 6 hours PRN Moderate Pain (4 - 6)  melatonin 3 milliGRAM(s) Oral at bedtime PRN Insomnia  polyethylene glycol 3350 17 Gram(s) Oral daily PRN Constipation  prochlorperazine   Tablet 10 milliGRAM(s) Oral every 6 hours PRN nausea, vomiting    Allergies    codeine (Other)  latex (Rash)    Intolerances      REVIEW OF SYSTEMS:  All other systems reviewed and are negative    Vital Signs Last 24 Hrs  T(C): 37.1 (26 Feb 2022 16:47), Max: 37.1 (26 Feb 2022 11:37)  T(F): 98.7 (26 Feb 2022 16:47), Max: 98.7 (26 Feb 2022 11:37)  HR: 96 (26 Feb 2022 16:47) (86 - 96)  BP: 100/72 (26 Feb 2022 16:47) (100/72 - 100/72)  BP(mean): --  RR: 18 (26 Feb 2022 16:47) (18 - 18)  SpO2: 97% (26 Feb 2022 16:47) (97% - 97%)  Daily     Daily   I&O's Summary    CAPILLARY BLOOD GLUCOSE      POCT Blood Glucose.: 82 mg/dL (27 Feb 2022 07:24)  POCT Blood Glucose.: 178 mg/dL (26 Feb 2022 16:20)  POCT Blood Glucose.: 112 mg/dL (26 Feb 2022 11:20)    PHYSICAL EXAM:  Refused physical exam.   Appears conformable Intentionally looking away at TV. Pt observed using extremities eating. Moving all extremities spontaneously. Not in distress.     Labs

## 2022-02-28 VITALS
OXYGEN SATURATION: 100 % | TEMPERATURE: 98 F | HEART RATE: 77 BPM | SYSTOLIC BLOOD PRESSURE: 100 MMHG | DIASTOLIC BLOOD PRESSURE: 70 MMHG | RESPIRATION RATE: 18 BRPM

## 2022-02-28 LAB
GLUCOSE BLDC GLUCOMTR-MCNC: 114 MG/DL — HIGH (ref 70–99)
GLUCOSE BLDC GLUCOMTR-MCNC: 64 MG/DL — LOW (ref 70–99)
GLUCOSE BLDC GLUCOMTR-MCNC: 66 MG/DL — LOW (ref 70–99)
GLUCOSE BLDC GLUCOMTR-MCNC: 66 MG/DL — LOW (ref 70–99)
GLUCOSE BLDC GLUCOMTR-MCNC: 82 MG/DL — SIGNIFICANT CHANGE UP (ref 70–99)

## 2022-02-28 PROCEDURE — 99239 HOSP IP/OBS DSCHRG MGMT >30: CPT

## 2022-02-28 RX ORDER — GLUCAGON INJECTION, SOLUTION 0.5 MG/.1ML
1 INJECTION, SOLUTION SUBCUTANEOUS ONCE
Refills: 0 | Status: COMPLETED | OUTPATIENT
Start: 2022-02-28 | End: 2022-02-28

## 2022-02-28 RX ORDER — PANTOPRAZOLE SODIUM 20 MG/1
1 TABLET, DELAYED RELEASE ORAL
Qty: 30 | Refills: 0
Start: 2022-02-28 | End: 2022-03-29

## 2022-02-28 RX ORDER — ALBUTEROL 90 UG/1
3 AEROSOL, METERED ORAL
Qty: 0 | Refills: 0 | DISCHARGE

## 2022-02-28 RX ORDER — DEXAMETHASONE 0.5 MG/5ML
1 ELIXIR ORAL
Qty: 60 | Refills: 0
Start: 2022-02-28 | End: 2022-03-29

## 2022-02-28 RX ORDER — PROCHLORPERAZINE MALEATE 5 MG
1 TABLET ORAL
Qty: 120 | Refills: 0
Start: 2022-02-28 | End: 2022-03-29

## 2022-02-28 RX ORDER — DEXTROSE 50 % IN WATER 50 %
15 SYRINGE (ML) INTRAVENOUS ONCE
Refills: 0 | Status: DISCONTINUED | OUTPATIENT
Start: 2022-02-28 | End: 2022-02-28

## 2022-02-28 RX ORDER — VALPROIC ACID (AS SODIUM SALT) 250 MG/5ML
1 SOLUTION, ORAL ORAL
Qty: 60 | Refills: 0
Start: 2022-02-28 | End: 2022-03-29

## 2022-02-28 RX ADMIN — MONTELUKAST 5 MILLIGRAM(S): 4 TABLET, CHEWABLE ORAL at 14:44

## 2022-02-28 RX ADMIN — Medication 4 MILLIGRAM(S): at 05:13

## 2022-02-28 RX ADMIN — Medication 26.25 MILLIGRAM(S): at 05:14

## 2022-03-09 DIAGNOSIS — E88.09 OTHER DISORDERS OF PLASMA-PROTEIN METABOLISM, NOT ELSEWHERE CLASSIFIED: ICD-10-CM

## 2022-03-09 DIAGNOSIS — K21.9 GASTRO-ESOPHAGEAL REFLUX DISEASE WITHOUT ESOPHAGITIS: ICD-10-CM

## 2022-03-09 DIAGNOSIS — R47.81 SLURRED SPEECH: ICD-10-CM

## 2022-03-09 DIAGNOSIS — R41.89 OTHER SYMPTOMS AND SIGNS INVOLVING COGNITIVE FUNCTIONS AND AWARENESS: ICD-10-CM

## 2022-03-09 DIAGNOSIS — G93.6 CEREBRAL EDEMA: ICD-10-CM

## 2022-03-09 DIAGNOSIS — Z79.52 LONG TERM (CURRENT) USE OF SYSTEMIC STEROIDS: ICD-10-CM

## 2022-03-09 DIAGNOSIS — B96.4 PROTEUS (MIRABILIS) (MORGANII) AS THE CAUSE OF DISEASES CLASSIFIED ELSEWHERE: ICD-10-CM

## 2022-03-09 DIAGNOSIS — E83.51 HYPOCALCEMIA: ICD-10-CM

## 2022-03-09 DIAGNOSIS — I63.9 CEREBRAL INFARCTION, UNSPECIFIED: ICD-10-CM

## 2022-03-09 DIAGNOSIS — K80.33 CALCULUS OF BILE DUCT WITH ACUTE CHOLANGITIS WITH OBSTRUCTION: ICD-10-CM

## 2022-03-09 DIAGNOSIS — A41.59 OTHER GRAM-NEGATIVE SEPSIS: ICD-10-CM

## 2022-03-09 DIAGNOSIS — G81.91 HEMIPLEGIA, UNSPECIFIED AFFECTING RIGHT DOMINANT SIDE: ICD-10-CM

## 2022-03-09 DIAGNOSIS — Z92.3 PERSONAL HISTORY OF IRRADIATION: ICD-10-CM

## 2022-03-09 DIAGNOSIS — J45.20 MILD INTERMITTENT ASTHMA, UNCOMPLICATED: ICD-10-CM

## 2022-03-09 DIAGNOSIS — G93.41 METABOLIC ENCEPHALOPATHY: ICD-10-CM

## 2022-03-09 DIAGNOSIS — Z20.822 CONTACT WITH AND (SUSPECTED) EXPOSURE TO COVID-19: ICD-10-CM

## 2022-03-09 DIAGNOSIS — C34.90 MALIGNANT NEOPLASM OF UNSPECIFIED PART OF UNSPECIFIED BRONCHUS OR LUNG: ICD-10-CM

## 2022-03-09 DIAGNOSIS — D69.6 THROMBOCYTOPENIA, UNSPECIFIED: ICD-10-CM

## 2022-03-09 DIAGNOSIS — Z92.21 PERSONAL HISTORY OF ANTINEOPLASTIC CHEMOTHERAPY: ICD-10-CM

## 2022-03-09 DIAGNOSIS — Z79.899 OTHER LONG TERM (CURRENT) DRUG THERAPY: ICD-10-CM

## 2022-03-09 DIAGNOSIS — R29.710 NIHSS SCORE 10: ICD-10-CM

## 2022-03-09 DIAGNOSIS — Z88.5 ALLERGY STATUS TO NARCOTIC AGENT: ICD-10-CM

## 2022-03-09 DIAGNOSIS — N39.0 URINARY TRACT INFECTION, SITE NOT SPECIFIED: ICD-10-CM

## 2022-03-09 DIAGNOSIS — D63.0 ANEMIA IN NEOPLASTIC DISEASE: ICD-10-CM

## 2022-03-09 DIAGNOSIS — Z91.040 LATEX ALLERGY STATUS: ICD-10-CM

## 2022-03-09 DIAGNOSIS — C79.31 SECONDARY MALIGNANT NEOPLASM OF BRAIN: ICD-10-CM

## 2022-03-09 DIAGNOSIS — E43 UNSPECIFIED SEVERE PROTEIN-CALORIE MALNUTRITION: ICD-10-CM

## 2022-04-25 NOTE — PHYSICAL THERAPY INITIAL EVALUATION ADULT - ADDITIONAL COMMENTS
Requested Prescriptions     Pending Prescriptions Disp Refills    oxyCODONE-acetaminophen (PERCOCET) 5-325 MG per tablet 8 tablet 0     Sig: Take 1 tablet by mouth 2 times daily as needed for Pain for up to 4 days. Patient last seen on:  3/24/22  Date of last surgery:  n/a  Date of last refill:  3/29/22  Pain level:  n/a  Patient complaining of:  Pt next appt is after rx is due.    Future appts: 5/2/22
Per OT eval and confirmed with patient and  at bedside: Patient lives in a private house with 2 steps to enter without handrail. Once inside, pt has to negotiate a flight of stairs with B/L handrail to access the bedroom and bathroom. The bathroom has a tub/shower combination and standard toilet. Patient prior to admission was independent with all functional mobility and ADLs without a device.  voiced that they own rolling walker and cane in good working condition and easily accessible at home.

## 2022-06-28 NOTE — CONSULT NOTE ADULT - ATTENDING SUPERVISION STATEMENT
ACP Sarecycline Counseling: Patient advised regarding possible photosensitivity and discoloration of the teeth, skin, lips, tongue and gums.  Patient instructed to avoid sunlight, if possible.  When exposed to sunlight, patients should wear protective clothing, sunglasses, and sunscreen.  The patient was instructed to call the office immediately if the following severe adverse effects occur:  hearing changes, easy bruising/bleeding, severe headache, or vision changes.  The patient verbalized understanding of the proper use and possible adverse effects of sarecycline.  All of the patient's questions and concerns were addressed.

## 2022-08-06 NOTE — OCCUPATIONAL THERAPY INITIAL EVALUATION ADULT - PROPRIOCEPTION, RLE, OT EVAL
DISPOSITION: Patient has been instructed to keep affected arm below shoulder level for the next 4 weeks or until cleared by doctor. The arm sling should be worn while sleeping. The dressing will be removed at follow up appointment. The incision site must be kept clean and dry. The patient may shower in a few days. Lotions, powders, or creams should be avoided as these can increase the risk of infection. The affected arm should not be used for any pushing, pulling, or lifting until cleared by doctor. Driving is prohibited until cleared by doctor in a follow up appointment. Any signs of infection including increased redness, suspicious drainage, or unexplained fever should be reported to the doctor immediately by calling [phone].
within normal limits

## 2022-09-19 NOTE — ED ADULT NURSE NOTE - NSFALLRSKASSISTTYPE_ED_ALL_ED
Anesthesia Evaluation     Patient summary reviewed and Nursing notes reviewed   NPO Solid Status: N/A  NPO Liquid Status: N/A           Airway   Mallampati: I  TM distance: <3 FB  Neck ROM: full  no difficulty expected  Dental - normal exam     Pulmonary - negative pulmonary ROS and normal exam   Cardiovascular - negative cardio ROS and normal exam  Exercise tolerance: excellent (>7 METS)    NYHA Classification: I        Neuro/Psych- negative ROS  GI/Hepatic/Renal/Endo    (+)  GERD,      Musculoskeletal (-) negative ROS    Abdominal  - normal exam   Substance History - negative use     OB/GYN negative ob/gyn ROS         Other - negative ROS                       Anesthesia Plan    ASA 1     epidural       Anesthetic plan, risks, benefits, and alternatives have been provided, discussed and informed consent has been obtained with: patient.    Plan discussed with CRNA.        CODE STATUS:    Level Of Support Discussed With: Patient  Code Status (Patient has no pulse and is not breathing): CPR (Attempt to Resuscitate)  Medical Interventions (Patient has pulse or is breathing): Full      
Standing/Walking

## 2022-10-03 NOTE — DISCHARGE NOTE NURSING/CASE MANAGEMENT/SOCIAL WORK - NSTRANSFERBELONGINGSRESP_GEN_A_NUR
Cryotherapy    What is it?  Use of a very cold liquid, such as liquid nitrogen, to freeze and destroy abnormal skin cells that need to be removed    What should I expect?  Tenderness and redness  A small blister that might grow and fill with dark purple blood. There may be crusting.  More than one treatment may be needed if the lesions do not go away.    How do I care for the treated area?  Gently wash the area with your hands when bathing.  Use a thin layer of Vaseline to help with healing. You may use a Band-Aid.   The area should heal within 7-10 days and may leave behind a pink or lighter color.   Do not use an antibiotic or Neosporin ointment.   You may take acetaminophen (Tylenol) for pain.     Call your doctor if you have:  Severe pain  Signs of infection (warmth, redness, cloudy yellow drainage, and or a bad smell)  Questions or concerns    Who should I call with questions?      [facility]: [phone]      [facility]: [phone]      For urgent needs outside of business hours call the [facility] at [phone] and ask for the dermatology resident on call   
yes

## 2022-11-30 NOTE — ED ADULT TRIAGE NOTE - DATE OF LAST VACCINATION
Please advise of any other medication or if a PA should be started. Pharmacy sent fax, that Hydroxyzine is being denied.  Tried calling to do a PA but keep getting disconnected or transferred.   Controlled with current regime 02-Jan-2021

## 2023-03-15 NOTE — ED PROVIDER NOTE - NS ED MD EM SELECTION
Is the patient currently in the state of [place]? YES    Visit mode:VIDEO    If the visit is dropped, the patient can be reconnected by: VIDEO VISIT: Text to cell phone: [phone]    Will anyone else be joining the visit? NO      How would you like to obtain your AVS? Mail a copy    Are changes needed to the allergy or medication list? NO    Reason for visit: Follow up       [id number] Detailed

## 2023-06-05 NOTE — DIETITIAN NUTRITION RISK NOTIFICATION - UPON NUTRITIONAL ASSESSMENT BY THE REGISTERED DIETITIAN YOUR PATIENT WAS DETERMINED TO MEET CRITERIA/HAS EVIDENCE OF THE FOLLOWING DIAGNOSIS:
evidenced by BMI, weight loss, intake 51-75%    ANTHROPOMETRICS  Current Height: 5' 6\" (167.6 cm)  Current Body Weight: 126 lb (57.2 kg) *STATED*   Admission Body Weight: 115 lb (52.2 kg) (at RD referral)   Ideal Body Weight (IBW): 130 lbs  (59 kg)    Usual Bodyweight 147 lb (66.7 kg)   Weight Changes -10 lb in 2 mo;       BMI: 20.3    COMPARATIVE STANDARDS  Energy (kcal):  6345-6977     Protein (g):   g (1.0-1.5)       Fluid (ml/day):  7591-1637    CURRENT NUTRITION THERAPIES  PO Diet:General Diet   ONS: [place] Protein (340 kcal/30 g pro)   PO Intake: 51-75%   PO Supplement Intake:%    Consult dietitian if nutrition interventions essential to patient care is needed.      Timo Hyde, RD, LD:
Severe protein-calorie malnutrition
Moderate protein-calorie malnutrition

## 2023-10-16 NOTE — SWALLOW BEDSIDE ASSESSMENT ADULT - SLP PERTINENT HISTORY OF CURRENT PROBLEM
admitted suspected CVA; ED with slurry speech and slight right leg weakness started at 6AM, last known normal was 4AM.  Reported 1-2 episode of vomiting last night
admitted suspected CVA; ED with slurry speech and slight right leg weakness started at 6AM, last known normal was 4AM.  Reported 1-2 episode of vomiting last night
Consent 2/Introductory Paragraph: Mohs surgery was explained to the patient and consent was obtained. The risks, benefits and alternatives to therapy were discussed in detail. Specifically, the risks of infection, scarring, bleeding, prolonged wound healing, incomplete removal, allergy to anesthesia, nerve injury and recurrence were addressed. Prior to the procedure, the treatment site was clearly identified and confirmed by the patient. All components of Universal Protocol/PAUSE Rule completed.

## 2024-08-08 NOTE — DIETITIAN INITIAL EVALUATION ADULT. - PROBLEM SELECTOR PLAN 1
no
present with slurry speech and slight right leg weakness  Not a candidate for TPA  CT head with Interval decompressive left frontotemporal craniectomy with decreased mass effect and resolved rightward midline shift secondary to significant left frontal vasogenic edema. No acute intracranial hemorrhage, extra-axial collection or new suspicious region of vasogenic edema. CTA head/neck with no vaso-occlusive disease. CT perfusion-26 mL of tissue with elevated time to maximum in the left superior medial frontal lobe, likely representing chronic posttreatment changes  Neurology consulted  Aspirin 80mg  No statin due to elevated LFT  MRI brain with and without contrast  EEG  Dexamethasone 10mg stat and 4mg bid  Neuro check  PT/OT/Speech ry

## 2024-09-02 NOTE — ED ADULT NURSE NOTE - GENITOURINARY WDL
Bladder non-tender and non-distended. Urine clear yellow. 62 year old female with history of CAD (s/p 1 stent in 2016), HTN, presenting with about 5 days of worsened lower back pain with radiation to LLE.     #Sciatica   - 5 days of severe lower back pain, limiting ability to ambulate with radiation to LLE. No unintentional weight loss, no night sweats, fevers, early satiety, nausea, urinary or bowel incontinence, urinary retention  - lumbar XR imaging without acute findings.   - provided supportive care with pain control  - complete a 5 day course with prednisone 40 mg daily ( end 9/4)   - use tylenol, motrin, ice packs/hot packs, lidocaine patches as needed for symptom management   - OP PT    #UTI  - Patient reports approx 2 weeks of increased urinary urgency and mild itching, similar to prior urinary infections. UA with small LE, ED started antibiotics for potential infection.   - completed 3 day course with CTX e,  - culture with no growth     #Hypertension.   C/w home amlodipine, losartan.    #CAD (coronary artery disease).   - C/w home aspirin, statin, ARB   Not on BB.    #COPD.   - No sign of exacerbation. Not on home O2.   PRN nebs.    Medically Stable for Discharge

## 2024-10-30 NOTE — ED ADULT TRIAGE NOTE - INTERNATIONAL TRAVEL
Pt ambulatory to triage. Pt reports he is having pain to his upper thighs and to his groin area since this afternoon. Pt denies swelling or redness to the area.   
No